# Patient Record
Sex: FEMALE | Race: BLACK OR AFRICAN AMERICAN | NOT HISPANIC OR LATINO | Employment: FULL TIME | URBAN - METROPOLITAN AREA
[De-identification: names, ages, dates, MRNs, and addresses within clinical notes are randomized per-mention and may not be internally consistent; named-entity substitution may affect disease eponyms.]

---

## 2019-10-21 ENCOUNTER — HOSPITAL ENCOUNTER (OUTPATIENT)
Dept: URGENT CARE | Facility: CLINIC | Age: 54
Discharge: HOME | End: 2019-10-21
Attending: NURSE PRACTITIONER

## 2019-11-13 ENCOUNTER — HOSPITAL ENCOUNTER (OUTPATIENT)
Dept: CT IMAGING | Facility: HOSPITAL | Age: 54
Discharge: HOME | End: 2019-11-13
Attending: NURSE PRACTITIONER

## 2020-07-22 ENCOUNTER — HOSPITAL ENCOUNTER (OUTPATIENT)
Dept: CT IMAGING | Facility: HOSPITAL | Age: 55
Discharge: HOME | End: 2020-07-22
Attending: NURSE PRACTITIONER

## 2020-07-22 LAB
CREAT BLD-MCNC: 0.8 MG/DL (ref 0.6–1.4)
GFR SERPLBLD BASED ON 1.73 SQ M-ARVRAT: >60 ML/MIN/{1.73_M2}

## 2021-05-25 ENCOUNTER — TRANSCRIBE ORDERS (OUTPATIENT)
Dept: ADMINISTRATIVE | Facility: HOSPITAL | Age: 56
End: 2021-05-25

## 2021-05-25 DIAGNOSIS — N63.20 LEFT BREAST MASS: Primary | ICD-10-CM

## 2021-06-01 ENCOUNTER — HOSPITAL ENCOUNTER (OUTPATIENT)
Dept: GENERAL RADIOLOGY | Facility: HOSPITAL | Age: 56
Discharge: HOME | End: 2021-06-01
Attending: NURSE PRACTITIONER

## 2021-06-03 ENCOUNTER — OFFICE VISIT CONVERTED (OUTPATIENT)
Dept: SURGERY | Facility: CLINIC | Age: 56
End: 2021-06-03
Attending: SURGERY

## 2021-06-04 RX ORDER — HYDROCHLOROTHIAZIDE 12.5 MG/1
12.5 TABLET ORAL DAILY
COMMUNITY
End: 2021-11-24

## 2021-06-04 RX ORDER — NORETHINDRONE ACETATE AND ETHINYL ESTRADIOL 1; 5 MG/1; UG/1
0.5 TABLET ORAL 2 TIMES DAILY
COMMUNITY
End: 2021-11-24

## 2021-06-05 NOTE — H&P
"   History and Physical      Patient Name: Abiola Garibay   Patient ID: [id number]   Sex: Female   YOB: 1965    Primary Care Provider: Jessi CHARLES   Referring Provider: Jessi CHARLES    Visit Date: Dary 3, 2021    Provider: Aaron Gordon MD   [facility]   Location Address: [address] [place] [address]  [id number]   Location Phone: [phone]          Chief Complaint  · Outpatient History & Physical / Surgical Orders  · Right groin mass      History Of Present Illness  Abiola Garibay is a 56 year old /[place] Native female who presents to the office today as a consult from Jessi CHARLES. She presents today for an enlarged inguinal lymph node. The patient reports she first noticed the enlargement three weeks ago, or maybe longer. She is not sure. She noticed the mass in her groin seemed to be getting bigger. She was sent to her PCP, who got a CT scan, which showed some lymphadenopathy in the region. After some treatments, the CT scan was repeated and what appears to be an enlarged lymph node seemed the same size or possibly worse. She reports that this is not painful. She is not had any overlying skin changes or drainage.       Past Medical History  High blood pressure         Past Surgical History  Breast; Hysterectomy-Abdominal         Medication List  hydrochlorothiazide 12.5 mg oral tablet         Allergy List  Bee Stings; lisinopril         Family Medical History  Diabetes, unspecified type; Family history of breast cancer         Social History  Alcohol; Tobacco (Never)         Review of Systems  · Gastrointestinal  o Denies  o : nausea, vomiting, diarrhea, constipation      Vitals  Date Time BP Position Site L\R Cuff Size HR RR TEMP (F) WT  HT  BMI kg/m2 BSA m2 O2 Sat FR L/min FiO2        06/03/2021 10:59 AM       14  165lbs 0oz 5'  6\" 26.63 1.87             Physical Examination  · Constitutional  o Appearance  o : " well developed, well-nourished, alert and in no acute distress  · Head and Face  o Head  o :   § Inspection  § : no deformities or lesions  · Eyes  o Conjunctivae  o : clear  o Sclerae  o : clear  · Neck  o Inspection/Palpation  o : normal appearance, no masses or tenderness, trachea midline  · Respiratory  o Respiratory Effort  o : breathing unlabored  o Inspection of Chest  o : normal appearance, no retractions  · Cardiovascular  o Heart  o : regular rate and rhythm  · Gastrointestinal  o Abdominal Examination  o : abdomen is soft  · Lymphatic  o Neck  o : no lymphadenopathy present  o Axilla  o : no lymphadenopathy present  o Groin  o : right inguinal region has a very enlarged lymph node. It appears to be about 5.0-6.0 cm. It feels like a mass but based on the CT findings, I assume it's a lymph node.   · Skin and Subcutaneous Tissue  o General Inspection  o : no rashes present, no lesions present, no areas of discoloration  · Neurologic  o Cranial Nerves  o : grossly intact  o Sensation  o : grossly intact  o Gait and Station  o :   § Gait Screening  § : normal gait, able to stand without diffculty  o Cerebellar Function  o : no obvious abnormalities  · Psychiatric  o Judgement and Insight  o : judgment and insight intact  o Mood and Affect  o : mood normal, affect appropriate          Assessment  · Pre-Surgical Orders     V72.84  · Groin mass in female     789.39/R19.09  Right side     Patient with an enlarged right inguinal lymphadenopathy.       Plan  · Orders  o General Surgery Order (GENOR) - 789.39/R19.09 - 06/09/2021  · Medications  o Medications have been Reconciled  o Transition of Care or Provider Policy  · Instructions  o PLAN:   o Handouts Provided-Pre-Procedure Instructions including date and time and location of procedure.  o Surgical Facility: [facility]  o ****Patient Status****  o Outpatient  o ********************  o RISK AND BENEFITS:  o Consent for surgery: Given these options, the  patient has verbally expressed an understanding of the risks of surgery and finds these risks acceptable. We will proceed with surgery as soon as possible.  o Consult Anesthesia for any post-operative block, or any pain management procedure deemed necessary by the anestesiologist for adequate post-operative pain control.   o O.R. PREP: Per protocol  o IV: Per Anesthesia  o IV: LR@ 75ml/hr  o PLEASE SIGN PERMIT FOR: Excision of right inguinal lymph node  o *__Kefzol 2 gram IV on call to OR.  o *___The above History and Physical Examination has been completed within 30 days of admission.  o Pre-Admission Testing Date: [facility] PHONE: 6/04/21 at 8AM  o Electronically Identified Patient Education Materials Provided Electronically  · Referrals  o ID: [id number] Date: 05/24/2021 Type: Inbound  Specialty: General Surgery     We will plan for excision of the right inguinal lymph node. Risks, benefits, and alternatives were discussed with the patient extensively. All questions were answered. The patient voiced understanding and agrees to proceed with the above plan.             Electronically Signed by: Palma Pierce-, -Author on June 4, 2021 09:23:51 AM  Electronically Co-signed by: Aaron Gordon MD -Reviewer on June 4, 2021 10:03:30 AM

## 2021-06-08 ENCOUNTER — HOSPITAL ENCOUNTER (OUTPATIENT)
Dept: OTHER | Facility: HOSPITAL | Age: 56
Discharge: HOME | End: 2021-06-08

## 2021-06-08 DIAGNOSIS — Z09 FOLLOW UP: ICD-10-CM

## 2021-06-09 ENCOUNTER — HOSPITAL ENCOUNTER (OUTPATIENT)
Facility: HOSPITAL | Age: 56
Setting detail: HOSPITAL OUTPATIENT SURGERY
Discharge: HOME | End: 2021-06-09
Attending: SURGERY | Admitting: SURGERY

## 2021-06-09 ENCOUNTER — ANESTHESIA (OUTPATIENT)
Dept: PERIOP | Facility: HOSPITAL | Age: 56
End: 2021-06-09

## 2021-06-09 ENCOUNTER — ANESTHESIA EVENT (OUTPATIENT)
Dept: PERIOP | Facility: HOSPITAL | Age: 56
End: 2021-06-09

## 2021-06-09 VITALS
RESPIRATION RATE: 18 BRPM | BODY MASS INDEX: 26.15 KG/M2 | WEIGHT: 162.7 LBS | TEMPERATURE: 97.5 F | OXYGEN SATURATION: 100 % | DIASTOLIC BLOOD PRESSURE: 58 MMHG | SYSTOLIC BLOOD PRESSURE: 114 MMHG | HEIGHT: 66 IN | HEART RATE: 54 BPM

## 2021-06-09 DIAGNOSIS — R59.9 ENLARGED LYMPH NODE: Primary | ICD-10-CM

## 2021-06-09 DIAGNOSIS — N63.20 LEFT BREAST MASS: ICD-10-CM

## 2021-06-09 PROBLEM — Z98.890 H/O LYMPH NODE EXCISION: Status: ACTIVE | Noted: 2021-06-09

## 2021-06-09 LAB
ANION GAP SERPL CALCULATED.3IONS-SCNC: 7.4 MMOL/L (ref 5–15)
BUN SERPL-MCNC: 9 MG/DL (ref 6–20)
BUN/CREAT SERPL: 8.5 (ref 7–25)
CALCIUM SPEC-SCNC: 9.2 MG/DL (ref 8.6–10.5)
CHLORIDE SERPL-SCNC: 103 MMOL/L (ref 98–107)
CO2 SERPL-SCNC: 26.6 MMOL/L (ref 22–29)
CREAT SERPL-MCNC: 1.06 MG/DL (ref 0.57–1)
GFR SERPL CREATININE-BSD FRML MDRD: 65 ML/MIN/1.73
GLUCOSE SERPL-MCNC: 88 MG/DL (ref 65–99)
POTASSIUM SERPL-SCNC: 3.7 MMOL/L (ref 3.5–5.2)
QT INTERVAL: 446 MS
SODIUM SERPL-SCNC: 137 MMOL/L (ref 136–145)

## 2021-06-09 PROCEDURE — 25010000002 MIDAZOLAM PER 1MG: Performed by: ANESTHESIOLOGY

## 2021-06-09 PROCEDURE — 25010000003 CEFAZOLIN IN DEXTROSE 2-4 GM/100ML-% SOLUTION: Performed by: SURGERY

## 2021-06-09 PROCEDURE — 25010000002 PROPOFOL 10 MG/ML EMULSION: Performed by: NURSE ANESTHETIST, CERTIFIED REGISTERED

## 2021-06-09 PROCEDURE — 38531 OPEN BX/EXC INGUINOFEM NODES: CPT | Performed by: SPECIALIST/TECHNOLOGIST, OTHER

## 2021-06-09 PROCEDURE — 25010000002 FENTANYL CITRATE (PF) 50 MCG/ML SOLUTION: Performed by: NURSE ANESTHETIST, CERTIFIED REGISTERED

## 2021-06-09 PROCEDURE — 88305 TISSUE EXAM BY PATHOLOGIST: CPT | Performed by: SURGERY

## 2021-06-09 PROCEDURE — 88184 FLOWCYTOMETRY/ TC 1 MARKER: CPT

## 2021-06-09 PROCEDURE — 25010000002 DEXAMETHASONE PER 1 MG: Performed by: NURSE ANESTHETIST, CERTIFIED REGISTERED

## 2021-06-09 PROCEDURE — 38531 OPEN BX/EXC INGUINOFEM NODES: CPT | Performed by: SURGERY

## 2021-06-09 PROCEDURE — 80048 BASIC METABOLIC PNL TOTAL CA: CPT | Performed by: SURGERY

## 2021-06-09 PROCEDURE — 88342 IMHCHEM/IMCYTCHM 1ST ANTB: CPT | Performed by: SURGERY

## 2021-06-09 PROCEDURE — 25010000002 ONDANSETRON PER 1 MG: Performed by: NURSE ANESTHETIST, CERTIFIED REGISTERED

## 2021-06-09 PROCEDURE — 88341 IMHCHEM/IMCYTCHM EA ADD ANTB: CPT | Performed by: SURGERY

## 2021-06-09 PROCEDURE — 93005 ELECTROCARDIOGRAM TRACING: CPT | Performed by: SURGERY

## 2021-06-09 PROCEDURE — 25010000003 LIDOCAINE 1 % SOLUTION 20 ML VIAL: Performed by: SURGERY

## 2021-06-09 PROCEDURE — 88185 FLOWCYTOMETRY/TC ADD-ON: CPT

## 2021-06-09 DEVICE — SEAL HEMO SURG ARISTA/AH ABS/PWDR 1GM: Type: IMPLANTABLE DEVICE | Site: GROIN | Status: FUNCTIONAL

## 2021-06-09 RX ORDER — SODIUM CHLORIDE 0.9 % (FLUSH) 0.9 %
10 SYRINGE (ML) INJECTION EVERY 12 HOURS SCHEDULED
Status: DISCONTINUED | OUTPATIENT
Start: 2021-06-09 | End: 2021-06-09 | Stop reason: HOSPADM

## 2021-06-09 RX ORDER — HYDROCODONE BITARTRATE AND ACETAMINOPHEN 5; 325 MG/1; MG/1
1-2 TABLET ORAL EVERY 4 HOURS PRN
Start: 2021-06-09 | End: 2021-08-18 | Stop reason: SDUPTHER

## 2021-06-09 RX ORDER — ONDANSETRON 2 MG/ML
4 INJECTION INTRAMUSCULAR; INTRAVENOUS ONCE AS NEEDED
Status: DISCONTINUED | OUTPATIENT
Start: 2021-06-09 | End: 2021-06-10 | Stop reason: HOSPADM

## 2021-06-09 RX ORDER — DEXAMETHASONE SODIUM PHOSPHATE 4 MG/ML
INJECTION, SOLUTION INTRA-ARTICULAR; INTRALESIONAL; INTRAMUSCULAR; INTRAVENOUS; SOFT TISSUE AS NEEDED
Status: DISCONTINUED | OUTPATIENT
Start: 2021-06-09 | End: 2021-06-09 | Stop reason: SURG

## 2021-06-09 RX ORDER — OXYCODONE HYDROCHLORIDE 5 MG/1
5 TABLET ORAL ONCE AS NEEDED
Status: DISCONTINUED | OUTPATIENT
Start: 2021-06-09 | End: 2021-06-10 | Stop reason: HOSPADM

## 2021-06-09 RX ORDER — OXYCODONE HYDROCHLORIDE AND ACETAMINOPHEN 5; 325 MG/1; MG/1
1 TABLET ORAL ONCE AS NEEDED
Status: DISCONTINUED | OUTPATIENT
Start: 2021-06-09 | End: 2021-06-10 | Stop reason: HOSPADM

## 2021-06-09 RX ORDER — CEFAZOLIN SODIUM 2 G/100ML
2 INJECTION, SOLUTION INTRAVENOUS
Status: COMPLETED | OUTPATIENT
Start: 2021-06-09 | End: 2021-06-09

## 2021-06-09 RX ORDER — GLYCOPYRROLATE 0.2 MG/ML
0.2 INJECTION INTRAMUSCULAR; INTRAVENOUS
Status: COMPLETED | OUTPATIENT
Start: 2021-06-09 | End: 2021-06-09

## 2021-06-09 RX ORDER — MIDAZOLAM HYDROCHLORIDE 1 MG/ML
2 INJECTION INTRAMUSCULAR; INTRAVENOUS ONCE
Status: COMPLETED | OUTPATIENT
Start: 2021-06-09 | End: 2021-06-09

## 2021-06-09 RX ORDER — PROPOFOL 10 MG/ML
VIAL (ML) INTRAVENOUS AS NEEDED
Status: DISCONTINUED | OUTPATIENT
Start: 2021-06-09 | End: 2021-06-09 | Stop reason: SURG

## 2021-06-09 RX ORDER — SODIUM CHLORIDE, SODIUM LACTATE, POTASSIUM CHLORIDE, CALCIUM CHLORIDE 600; 310; 30; 20 MG/100ML; MG/100ML; MG/100ML; MG/100ML
9 INJECTION, SOLUTION INTRAVENOUS CONTINUOUS PRN
Status: DISCONTINUED | OUTPATIENT
Start: 2021-06-09 | End: 2021-06-10 | Stop reason: HOSPADM

## 2021-06-09 RX ORDER — DOCUSATE SODIUM 250 MG
250 CAPSULE ORAL 2 TIMES DAILY PRN
Qty: 10 CAPSULE | Refills: 0
Start: 2021-06-09 | End: 2021-11-24

## 2021-06-09 RX ORDER — MEPERIDINE HYDROCHLORIDE 25 MG/ML
12.5 INJECTION INTRAMUSCULAR; INTRAVENOUS; SUBCUTANEOUS
Status: DISCONTINUED | OUTPATIENT
Start: 2021-06-09 | End: 2021-06-10 | Stop reason: HOSPADM

## 2021-06-09 RX ORDER — LIDOCAINE HYDROCHLORIDE 20 MG/ML
INJECTION, SOLUTION INFILTRATION; PERINEURAL AS NEEDED
Status: DISCONTINUED | OUTPATIENT
Start: 2021-06-09 | End: 2021-06-09 | Stop reason: SURG

## 2021-06-09 RX ORDER — SODIUM CHLORIDE 0.9 % (FLUSH) 0.9 %
10 SYRINGE (ML) INJECTION AS NEEDED
Status: DISCONTINUED | OUTPATIENT
Start: 2021-06-09 | End: 2021-06-09 | Stop reason: HOSPADM

## 2021-06-09 RX ORDER — ONDANSETRON 2 MG/ML
INJECTION INTRAMUSCULAR; INTRAVENOUS AS NEEDED
Status: DISCONTINUED | OUTPATIENT
Start: 2021-06-09 | End: 2021-06-09 | Stop reason: SURG

## 2021-06-09 RX ORDER — ACETAMINOPHEN 500 MG
1000 TABLET ORAL ONCE
Status: COMPLETED | OUTPATIENT
Start: 2021-06-09 | End: 2021-06-09

## 2021-06-09 RX ORDER — FENTANYL CITRATE 50 UG/ML
INJECTION, SOLUTION INTRAMUSCULAR; INTRAVENOUS AS NEEDED
Status: DISCONTINUED | OUTPATIENT
Start: 2021-06-09 | End: 2021-06-09 | Stop reason: SURG

## 2021-06-09 RX ADMIN — LIDOCAINE HYDROCHLORIDE 100 MG: 20 INJECTION, SOLUTION INFILTRATION; PERINEURAL at 18:16

## 2021-06-09 RX ADMIN — ACETAMINOPHEN 1000 MG: 500 TABLET ORAL at 14:21

## 2021-06-09 RX ADMIN — DEXAMETHASONE SODIUM PHOSPHATE 4 MG: 4 INJECTION INTRA-ARTICULAR; INTRALESIONAL; INTRAMUSCULAR; INTRAVENOUS; SOFT TISSUE at 18:32

## 2021-06-09 RX ADMIN — PROPOFOL 200 MG: 10 INJECTION, EMULSION INTRAVENOUS at 18:16

## 2021-06-09 RX ADMIN — FENTANYL CITRATE 50 MCG: 50 INJECTION INTRAMUSCULAR; INTRAVENOUS at 18:56

## 2021-06-09 RX ADMIN — ONDANSETRON 4 MG: 2 INJECTION INTRAMUSCULAR; INTRAVENOUS at 18:32

## 2021-06-09 RX ADMIN — CEFAZOLIN SODIUM 2 G: 2 INJECTION, SOLUTION INTRAVENOUS at 18:15

## 2021-06-09 RX ADMIN — GLYCOPYRROLATE 0.2 MG: 0.2 INJECTION INTRAMUSCULAR; INTRAVENOUS at 17:50

## 2021-06-09 RX ADMIN — FENTANYL CITRATE 50 MCG: 50 INJECTION INTRAMUSCULAR; INTRAVENOUS at 18:17

## 2021-06-09 RX ADMIN — MIDAZOLAM HYDROCHLORIDE 2 MG: 1 INJECTION, SOLUTION INTRAMUSCULAR; INTRAVENOUS at 17:51

## 2021-06-09 RX ADMIN — SODIUM CHLORIDE, POTASSIUM CHLORIDE, SODIUM LACTATE AND CALCIUM CHLORIDE 9 ML/HR: 600; 310; 30; 20 INJECTION, SOLUTION INTRAVENOUS at 14:21

## 2021-06-09 NOTE — ANESTHESIA PREPROCEDURE EVALUATION
Anesthesia Evaluation                  Airway   Mallampati: I  TM distance: >3 FB  Neck ROM: full  No difficulty expected  Dental      Pulmonary - normal exam    breath sounds clear to auscultation  Cardiovascular - normal exam    ECG reviewed  Rhythm: regular    (+) hypertension,       Neuro/Psych  GI/Hepatic/Renal/Endo      Musculoskeletal     Abdominal    Substance History      OB/GYN          Other                      Anesthesia Plan    ASA 2     general and MAC   (Patient understands anesthesia not responsible for dental damage.)  intravenous induction     Anesthetic plan, all risks, benefits, and alternatives have been provided, discussed and informed consent has been obtained with: patient.  Use of blood products discussed with patient .   Plan discussed with CRNA.

## 2021-06-10 NOTE — ANESTHESIA POSTPROCEDURE EVALUATION
Patient: Abiola Garibay    Procedure Summary     Date: 06/09/21 Room / Location: [facility] OR 02 / [facility] MAIN OR    Anesthesia Start: 1812 Anesthesia Stop: 1927    Procedure: EXCISION OF RIGHT INGUINAL LYMPHNOID (Right ) Diagnosis: (INGUINAL LYMPHNOID)    Surgeons: Aaron Gordon MD Provider: Saw Uriostegui MD    Anesthesia Type: general, MAC ASA Status: 2          Anesthesia Type: general, MAC    Vitals  Vitals Value Taken Time   /69 06/09/21 2008   Temp 36.5 °C (97.7 °F) 06/09/21 2005   Pulse 46 06/09/21 2012   Resp 20 06/09/21 2005   SpO2 100 % 06/09/21 2012   Vitals shown include unvalidated device data.        Post Anesthesia Care and Evaluation    Patient location during evaluation: PACU  Patient participation: complete - patient participated  Level of consciousness: awake and alert  Pain score: 0  Pain management: adequate  Airway patency: patent  Anesthetic complications: No anesthetic complications  PONV Status: none  Cardiovascular status: acceptable  Respiratory status: acceptable  Hydration status: acceptable  No anesthesia care post op

## 2021-06-11 ENCOUNTER — TELEPHONE (OUTPATIENT)
Dept: SURGERY | Facility: CLINIC | Age: 56
End: 2021-06-11

## 2021-06-11 DIAGNOSIS — R59.1 LYMPHADENOPATHY: Primary | ICD-10-CM

## 2021-06-11 NOTE — TELEPHONE ENCOUNTER
Jeny called from pathology. Has a lymph node biopsy from 06/09 that is being sent out to flow cytometry.  Dr. Gordon has to put an order in Epic before they can send it out. Jeny's number is [phone].

## 2021-06-21 LAB — REF LAB TEST METHOD: NORMAL

## 2021-06-23 ENCOUNTER — OFFICE VISIT (OUTPATIENT)
Dept: SURGERY | Facility: CLINIC | Age: 56
End: 2021-06-23

## 2021-06-23 ENCOUNTER — TELEPHONE (OUTPATIENT)
Dept: SURGERY | Facility: CLINIC | Age: 56
End: 2021-06-23

## 2021-06-23 VITALS — RESPIRATION RATE: 16 BRPM | HEIGHT: 66 IN | WEIGHT: 166 LBS | BODY MASS INDEX: 26.68 KG/M2

## 2021-06-23 DIAGNOSIS — C85.96 LYMPHOMA OF LYMPH NODES IN PELVIS, UNSPECIFIED LYMPHOMA TYPE (HCC): Primary | ICD-10-CM

## 2021-06-23 LAB
CYTO UR: NORMAL
LAB AP CASE REPORT: NORMAL
LAB AP CLINICAL INFORMATION: NORMAL
LAB AP FLOW CYTOMETRY SUMMARY: NORMAL
LAB AP SPECIAL STAINS: NORMAL
PATH REPORT.FINAL DX SPEC: NORMAL
PATH REPORT.GROSS SPEC: NORMAL

## 2021-06-23 PROCEDURE — 99024 POSTOP FOLLOW-UP VISIT: CPT | Performed by: SURGERY

## 2021-06-23 NOTE — PROGRESS NOTES
Chief Complaint: Follow-up    Subjective         History of Present Illness  Abiola Garibay is a 56 y.o. female presents to [facility] to be seen for excision of a right inguinal lymph node.  Patient is done well after her procedure.  She does have some swelling in the groin.  Otherwise doing well.  No new complaints.    Objective     Past Medical History:   Diagnosis Date   • Enlarged lymph node     right inguinal lymphnoid   • High blood pressure        Past Surgical History:   Procedure Laterality Date   • ABDOMINAL HYSTERECTOMY     • BREAST SURGERY      breast reduction (bilateral)    • EXCISION LESION Right 6/9/2021    Procedure: EXCISION OF RIGHT INGUINAL LYMPHNOID;  Surgeon: Aaron Gordon MD;  Location: [facility] MAIN OR;  Service: General;  Laterality: Right;         Current Outpatient Medications:   •  hydroCHLOROthiazide (HYDRODIURIL) 12.5 MG tablet, Take 12.5 mg by mouth Daily., Disp: , Rfl:   •  norethindrone-ethinyl estradiol (FEMHRT 1/5) 1-5 MG-MCG tablet, Take 0.5 tablets by mouth 2 (Two) Times a Day. Combination med of progestrone, estrogen, testosterone, sublingual tab - takes 1/2 in am, 1/2 in pm, Disp: , Rfl:   •  docusate sodium (COLACE) 250 MG capsule, Take 1 capsule by mouth 2 (Two) Times a Day As Needed for Constipation., Disp: 10 capsule, Rfl: 0  •  HYDROcodone-acetaminophen (NORCO) 5-325 MG per tablet, Take 1-2 tablets by mouth Every 4 (Four) Hours As Needed (Pain)., Disp: , Rfl:     Allergies   Allergen Reactions   • Bee Venom Hives   • Lisinopril Unknown - Low Severity        Family History   Problem Relation Age of Onset   • Breast cancer Sister         50S   • Diabetes Paternal Grandmother        Social History     Socioeconomic History   • Marital status:      Spouse name: Not on file   • Number of children: Not on file   • Years of education: Not on file   • Highest education level: Not on file   Tobacco Use   • Smoking status: Never Smoker   •  Smokeless tobacco: Never Used   Substance and Sexual Activity   • Alcohol use: Yes     Comment: rarely    • Drug use: Never        Physical Exam   Post Surgical Incision  Surgical wound: Healing appropriately, she does have a very large seroma with no signs of infection.  There is no erythema it is not warm to touch there is no purulent drainage.  She does have a small unepithelialized area in the midportion of the wound    Result Review :               Assessment and Plan    Diagnoses and all orders for this visit:    1. 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  Patient was given instructions and counseling regarding her condition or for health maintenance advice. Please see specific information pulled into the AVS if appropriate.     Patient seems to be healing appropriately.  At the time of the office visit I did not have her final pathology although cytology did seem to indicate a T-cell lymphoma.  After the patient left the office I got a call confirming her final pathology of a T-cell lymphoma.  I called the patient but did not get an answer and will call her back.  We will need to refer her to medical oncology for the ultimate treatment of her lymphoma.  In regards to her seroma, this will likely resolve on its own.  I have informed her to watch out for signs of infection.  If it becomes unmanageable or painful we can discuss aspiration.    Discussed with the patient - all questions were answered they voiced understanding and agreed to proceed with above plan

## 2021-06-25 ENCOUNTER — TELEPHONE (OUTPATIENT)
Dept: SURGERY | Facility: CLINIC | Age: 56
End: 2021-06-25

## 2021-07-06 ENCOUNTER — TELEPHONE (OUTPATIENT)
Dept: SURGERY | Facility: CLINIC | Age: 56
End: 2021-07-06

## 2021-07-06 NOTE — TELEPHONE ENCOUNTER
Patient said she got a prior authorization for care from her insurance for lymphedema therapy.  She said Dr. Gordon was supposed to be referring to oncology.  She asked to be called, as she thinks this might be a mistake.

## 2021-07-07 NOTE — TELEPHONE ENCOUNTER
Called and spoke with pt an answered her questions. Pt to cb with any other questions or concerns.

## 2021-07-15 VITALS — RESPIRATION RATE: 14 BRPM | HEIGHT: 66 IN | WEIGHT: 165 LBS | BODY MASS INDEX: 26.52 KG/M2

## 2021-07-16 PROBLEM — I10 HIGH BLOOD PRESSURE: Status: ACTIVE | Noted: 2021-07-16

## 2021-07-19 ENCOUNTER — TELEPHONE (OUTPATIENT)
Dept: SURGERY | Facility: CLINIC | Age: 56
End: 2021-07-19

## 2021-07-19 ENCOUNTER — TRANSCRIBE ORDERS (OUTPATIENT)
Dept: ADMINISTRATIVE | Facility: HOSPITAL | Age: 56
End: 2021-07-19

## 2021-07-19 ENCOUNTER — OFFICE VISIT (OUTPATIENT)
Dept: SURGERY | Facility: CLINIC | Age: 56
End: 2021-07-19

## 2021-07-19 VITALS — HEIGHT: 66 IN | BODY MASS INDEX: 26.36 KG/M2 | WEIGHT: 164 LBS | RESPIRATION RATE: 16 BRPM

## 2021-07-19 DIAGNOSIS — R10.30 LOWER ABDOMINAL PAIN: ICD-10-CM

## 2021-07-19 DIAGNOSIS — Z98.890 STATUS POST LYMPH NODE BIOPSY: Primary | ICD-10-CM

## 2021-07-19 DIAGNOSIS — Z98.890 S/P LYMPH NODE BIOPSY: Primary | ICD-10-CM

## 2021-07-19 PROCEDURE — 99213 OFFICE O/P EST LOW 20 MIN: CPT | Performed by: NURSE PRACTITIONER

## 2021-07-19 NOTE — TELEPHONE ENCOUNTER
Patient called and said there is swelling around the incision site and there is a hard spot there as well and would like to get in asap please. Also stated that her leg, ankle, calf, foot and abd is all swollen to. Started last week. Can you see her asap? SX with Heidi 6-9-21 (Excision of right inguinal lymphoid)

## 2021-07-19 NOTE — PROGRESS NOTES
"Chief Complaint  Post-op Follow-up (swelling)    Subjective          Abiola Garibay presents to [facility]  Patient is a 56-year-old Black/-American female that presents to general surgery office with  some concerns.    Patient underwent a excision of a right groin mass on 6/9/2021 performed by Dr. Aaron Gordon.    Patient reports today that she is with some right lower pelvic pain and right thigh swelling.    Patient reports that the swelling in her thigh is getting bigger.    Patient is with lots of concerns today, requesting CT of abdomen and pelvis.       Objective   Vital Signs:   Resp 16   Ht 167.6 cm (66\")   Wt 74.4 kg (164 lb)   BMI 26.47 kg/m²     Physical Exam  Abdominal:      Tenderness: There is abdominal tenderness.   Musculoskeletal:         General: Swelling present.      Right lower leg: Edema present.        Result Review :                 Assessment and Plan {CC Problem List  Visit Diagnosis   ROS  Review (Popup)  Health Maintenance  Quality  BestPractice  Medications  SmartSets  SnapShot Encounters  Media :23}   Diagnoses and all orders for this visit:    1. S/P lymph node biopsy (Primary)  -     CT Abdomen Pelvis With & Without Contrast    2. Lower abdominal pain  -     CT Abdomen Pelvis With & Without Contrast        Follow Up   Return for Follow-up with Dr. Gordon post imaging..       Patient was given instructions and counseling regarding her condition or for health maintenance advice. Please see specific information pulled into the AVS if appropriate.       "

## 2021-07-19 NOTE — TELEPHONE ENCOUNTER
Pt is aware her CT is r/s to [facility] on 7/20/21 arrive at 2:30 pm and follow oral prep for PM not AM.

## 2021-07-20 ENCOUNTER — HOSPITAL ENCOUNTER (OUTPATIENT)
Dept: CT IMAGING | Facility: HOSPITAL | Age: 56
Discharge: HOME | End: 2021-07-20

## 2021-07-20 ENCOUNTER — APPOINTMENT (OUTPATIENT)
Dept: CT IMAGING | Facility: HOSPITAL | Age: 56
End: 2021-07-20

## 2021-07-20 ENCOUNTER — CONSULT (OUTPATIENT)
Dept: ONCOLOGY | Facility: HOSPITAL | Age: 56
End: 2021-07-20

## 2021-07-20 ENCOUNTER — TRANSCRIBE ORDERS (OUTPATIENT)
Dept: ADMINISTRATIVE | Facility: HOSPITAL | Age: 56
End: 2021-07-20

## 2021-07-20 ENCOUNTER — HOSPITAL ENCOUNTER (OUTPATIENT)
Dept: CARDIOLOGY | Facility: HOSPITAL | Age: 56
Discharge: HOME | End: 2021-07-20

## 2021-07-20 ENCOUNTER — LAB (OUTPATIENT)
Dept: ONCOLOGY | Facility: HOSPITAL | Age: 56
End: 2021-07-20

## 2021-07-20 VITALS
RESPIRATION RATE: 16 BRPM | BODY MASS INDEX: 26.33 KG/M2 | WEIGHT: 163.8 LBS | DIASTOLIC BLOOD PRESSURE: 63 MMHG | HEIGHT: 66 IN | HEART RATE: 63 BPM | SYSTOLIC BLOOD PRESSURE: 117 MMHG | OXYGEN SATURATION: 99 %

## 2021-07-20 DIAGNOSIS — C84.45 PERIPHERAL T CELL LYMPHOMA OF INGUINAL REGION (HCC): ICD-10-CM

## 2021-07-20 DIAGNOSIS — Z79.899 HIGH RISK MEDICATION USE: ICD-10-CM

## 2021-07-20 DIAGNOSIS — I82.411 ACUTE DEEP VEIN THROMBOSIS (DVT) OF FEMORAL VEIN OF RIGHT LOWER EXTREMITY (HCC): ICD-10-CM

## 2021-07-20 DIAGNOSIS — M79.89 SWELLING OF RIGHT LOWER EXTREMITY: ICD-10-CM

## 2021-07-20 DIAGNOSIS — C84.45 PERIPHERAL T CELL LYMPHOMA OF INGUINAL REGION (HCC): Primary | ICD-10-CM

## 2021-07-20 LAB
ALBUMIN SERPL-MCNC: 4.5 G/DL (ref 3.5–5.2)
ALBUMIN/GLOB SERPL: 1.8 G/DL
ALP SERPL-CCNC: 48 U/L (ref 39–117)
ALT SERPL W P-5'-P-CCNC: 19 U/L (ref 1–33)
ANION GAP SERPL CALCULATED.3IONS-SCNC: 11.3 MMOL/L (ref 5–15)
AST SERPL-CCNC: 39 U/L (ref 1–32)
BASOPHILS # BLD AUTO: 0.02 10*3/MM3 (ref 0–0.2)
BASOPHILS NFR BLD AUTO: 0.5 % (ref 0–1.5)
BH CV LOW VAS RIGHT COMMON FEMORAL SPONT: 1
BH CV LOW VAS RIGHT EXTERNAL ILIAC SPONT: 1
BH CV LOW VAS RIGHT PROXIMAL FEMORAL SPONT: 1
BH CV LOWER VASCULAR LEFT COMMON FEMORAL AUGMENT: NORMAL
BH CV LOWER VASCULAR LEFT COMMON FEMORAL COMPETENT: NORMAL
BH CV LOWER VASCULAR LEFT COMMON FEMORAL COMPRESS: NORMAL
BH CV LOWER VASCULAR LEFT COMMON FEMORAL PHASIC: NORMAL
BH CV LOWER VASCULAR LEFT COMMON FEMORAL SPONT: NORMAL
BH CV LOWER VASCULAR RIGHT COMMON FEMORAL COMPRESS: NORMAL
BH CV LOWER VASCULAR RIGHT COMMON FEMORAL PHASIC: NORMAL
BH CV LOWER VASCULAR RIGHT COMMON FEMORAL SPONT: NORMAL
BH CV LOWER VASCULAR RIGHT EXTERNAL ILIAC SPONT: NORMAL
BH CV LOWER VASCULAR RIGHT GREATER SAPH AK COMPRESS: NORMAL
BH CV LOWER VASCULAR RIGHT GREATER SAPH AK PHASIC: NORMAL
BH CV LOWER VASCULAR RIGHT GREATER SAPH AK SPONT: NORMAL
BH CV LOWER VASCULAR RIGHT MID FEMORAL AUGMENT: NORMAL
BH CV LOWER VASCULAR RIGHT MID FEMORAL COMPETENT: NORMAL
BH CV LOWER VASCULAR RIGHT MID FEMORAL COMPRESS: NORMAL
BH CV LOWER VASCULAR RIGHT MID FEMORAL PHASIC: NORMAL
BH CV LOWER VASCULAR RIGHT MID FEMORAL SPONT: NORMAL
BH CV LOWER VASCULAR RIGHT PERONEAL COMPRESS: NORMAL
BH CV LOWER VASCULAR RIGHT PERONEAL PHASIC: NORMAL
BH CV LOWER VASCULAR RIGHT PERONEAL SPONT: NORMAL
BH CV LOWER VASCULAR RIGHT POPLITEAL AUGMENT: NORMAL
BH CV LOWER VASCULAR RIGHT POPLITEAL COMPETENT: NORMAL
BH CV LOWER VASCULAR RIGHT POPLITEAL COMPRESS: NORMAL
BH CV LOWER VASCULAR RIGHT POPLITEAL PHASIC: NORMAL
BH CV LOWER VASCULAR RIGHT POPLITEAL SPONT: NORMAL
BH CV LOWER VASCULAR RIGHT POSTERIOR TIBIAL COMPRESS: NORMAL
BH CV LOWER VASCULAR RIGHT POSTERIOR TIBIAL PHASIC: NORMAL
BH CV LOWER VASCULAR RIGHT POSTERIOR TIBIAL SPONT: NORMAL
BH CV LOWER VASCULAR RIGHT PROXIMAL FEMORAL AUGMENT: NORMAL
BH CV LOWER VASCULAR RIGHT PROXIMAL FEMORAL COMPRESS: NORMAL
BH CV LOWER VASCULAR RIGHT PROXIMAL FEMORAL PHASIC: NORMAL
BH CV LOWER VASCULAR RIGHT PROXIMAL FEMORAL SPONT: NORMAL
BILIRUB SERPL-MCNC: 0.6 MG/DL (ref 0–1.2)
BUN SERPL-MCNC: 12 MG/DL (ref 6–20)
BUN/CREAT SERPL: 12 (ref 7–25)
CALCIUM SPEC-SCNC: 9.9 MG/DL (ref 8.6–10.5)
CHLORIDE SERPL-SCNC: 101 MMOL/L (ref 98–107)
CO2 SERPL-SCNC: 27.7 MMOL/L (ref 22–29)
CREAT SERPL-MCNC: 1 MG/DL (ref 0.57–1)
DEPRECATED RDW RBC AUTO: 41.2 FL (ref 37–54)
EOSINOPHIL # BLD AUTO: 0.08 10*3/MM3 (ref 0–0.4)
EOSINOPHIL NFR BLD AUTO: 2 % (ref 0.3–6.2)
ERYTHROCYTE [DISTWIDTH] IN BLOOD BY AUTOMATED COUNT: 12.6 % (ref 12.3–15.4)
GFR SERPL CREATININE-BSD FRML MDRD: 69 ML/MIN/1.73
GLOBULIN UR ELPH-MCNC: 2.5 GM/DL
GLUCOSE SERPL-MCNC: 87 MG/DL (ref 65–99)
HCT VFR BLD AUTO: 38.6 % (ref 34–46.6)
HGB BLD-MCNC: 13.6 G/DL (ref 12–15.9)
IMM GRANULOCYTES # BLD AUTO: 0.01 10*3/MM3 (ref 0–0.05)
IMM GRANULOCYTES NFR BLD AUTO: 0.2 % (ref 0–0.5)
LDH SERPL-CCNC: 585 U/L (ref 135–214)
LYMPHOCYTES # BLD AUTO: 1.38 10*3/MM3 (ref 0.7–3.1)
LYMPHOCYTES NFR BLD AUTO: 33.7 % (ref 19.6–45.3)
MAXIMAL PREDICTED HEART RATE: 163 BPM
MCH RBC QN AUTO: 31.5 PG (ref 26.6–33)
MCHC RBC AUTO-ENTMCNC: 35.2 G/DL (ref 31.5–35.7)
MCV RBC AUTO: 89.4 FL (ref 79–97)
MONOCYTES # BLD AUTO: 0.72 10*3/MM3 (ref 0.1–0.9)
MONOCYTES NFR BLD AUTO: 17.6 % (ref 5–12)
NEUTROPHILS NFR BLD AUTO: 1.88 10*3/MM3 (ref 1.7–7)
NEUTROPHILS NFR BLD AUTO: 46 % (ref 42.7–76)
PLATELET # BLD AUTO: 192 10*3/MM3 (ref 140–450)
PMV BLD AUTO: 11.2 FL (ref 6–12)
POTASSIUM SERPL-SCNC: 3.4 MMOL/L (ref 3.5–5.2)
PROT SERPL-MCNC: 7 G/DL (ref 6–8.5)
RBC # BLD AUTO: 4.32 10*6/MM3 (ref 3.77–5.28)
SODIUM SERPL-SCNC: 140 MMOL/L (ref 136–145)
STRESS TARGET HR: 139 BPM
URATE SERPL-MCNC: 7.3 MG/DL (ref 2.4–5.7)
WBC # BLD AUTO: 4.09 10*3/MM3 (ref 3.4–10.8)

## 2021-07-20 PROCEDURE — 99205 OFFICE O/P NEW HI 60 MIN: CPT | Performed by: INTERNAL MEDICINE

## 2021-07-20 PROCEDURE — G0463 HOSPITAL OUTPT CLINIC VISIT: HCPCS

## 2021-07-20 PROCEDURE — 93971 EXTREMITY STUDY: CPT

## 2021-07-20 PROCEDURE — 83615 LACTATE (LD) (LDH) ENZYME: CPT

## 2021-07-20 PROCEDURE — 80053 COMPREHEN METABOLIC PANEL: CPT

## 2021-07-20 PROCEDURE — 85025 COMPLETE CBC W/AUTO DIFF WBC: CPT

## 2021-07-20 PROCEDURE — 36415 COLL VENOUS BLD VENIPUNCTURE: CPT

## 2021-07-20 PROCEDURE — 74178 CT ABD&PLV WO CNTR FLWD CNTR: CPT

## 2021-07-20 PROCEDURE — G0463 HOSPITAL OUTPT CLINIC VISIT: HCPCS | Performed by: INTERNAL MEDICINE

## 2021-07-20 PROCEDURE — 84550 ASSAY OF BLOOD/URIC ACID: CPT

## 2021-07-20 PROCEDURE — 93971 EXTREMITY STUDY: CPT | Performed by: SURGERY

## 2021-07-20 PROCEDURE — 0 IOPAMIDOL PER 1 ML: Performed by: NURSE PRACTITIONER

## 2021-07-20 RX ORDER — SODIUM CHLORIDE 9 MG/ML
500 INJECTION, SOLUTION INTRAVENOUS ONCE
Status: CANCELLED | OUTPATIENT
Start: 2021-07-21 | End: 2021-07-20

## 2021-07-20 RX ADMIN — IOPAMIDOL 100 ML: 755 INJECTION, SOLUTION INTRAVENOUS at 16:14

## 2021-07-20 NOTE — ASSESSMENT & PLAN NOTE
Patient presents for evaluation of newly diagnosed peripheral T-cell lymphoma, NOS.  This is identified on excisional biopsy of the right groin.  She does not have any other obvious adenopathy or B symptoms.  She does have a small nodule on the right upper eyelid that she states is getting larger and also an area on the left inner breast with erythema and mild induration that is new.  These are areas that could be concerning for involvement.  She will need to have complete staging evaluation including lab work today, ophthalmology evaluation for the eyelid nodule, PET scan, bone marrow aspiration/biopsy.  I discussed her case with Dr. Gordon her surgeon.  He can perform a punch biopsy of the skin on the left breast.  We discussed that treatment for T-cell lymphoma usually involves chemotherapy such as CHOEP or CHOP typically for 6 cycles with consideration of involved site radiation.  Based on response to induction therapy, there is also the possibility for treatment such as high-dose chemotherapy with stem cell rescue i.e. autologous stem cell transplant.  We discussed the need for evaluation of bone marrow transplant center.  She reports that she is already contacted [facility] and has an appointment with them 8/4/2021.  This office will try to expedite her staging evaluation.  I will see her back afterward to review the studies, finalize her stage and IPI and move forward with treatment.  She will have echocardiogram to assess cardiac function prior to anthracycline based therapy.

## 2021-07-20 NOTE — PROGRESS NOTES
Chief Complaint  Lymphoma    Jessi Ennis, JOSE M  RaymonJessi, JOSE M    Subjective          Abiola Garibay presents to [facility] for evaluation of T-cell lymphoma.  Patient reports that she felt a lump in the right groin in May of this year when she hit her leg against the counter.  She notes that it was slightly tender.  She reports it is about 3 inches in largest dimension.  It got a little bit larger and she subsequently sought evaluation.  She was referred to Dr. Gordon with surgery.  She underwent excisional biopsy of the right groin in June.  The specimen was sent to [facility] for expert opinion and returned as positive for peripheral T-cell lymphoma, NOS.  She reports that the right groin incision is healing well.  Approximately 1 week ago, she noticed some swelling in the right leg extending down to the foot.  She has been using compression stocking.  It is slightly tender but not interfering with her normal activities.  She denies any other masses or adenopathy.  She does have a small firm nodule on her right eyelid.  She notes that this has been there for several years but she does feel like it is getting larger.  She also reports a reddened thickened area of the skin of the left lower inner breast.  This was noted a couple of weeks ago.  She has had mammogram which was reportedly normal.  She denies any other changes to the skin.  She denies fever, chills, weight loss, night sweats.  She reports good energy level.  She denies excessive bruising or bleeding.  Because of the new diagnosis of lymphoma, she is referred for evaluation.    Oncology/Hematology History    No history exists.       Review of Systems   Constitutional: Negative for appetite change, diaphoresis, fatigue, fever, unexpected weight gain and unexpected weight loss.   HENT: Positive for swollen glands. Negative for hearing loss, mouth sores, sore throat, trouble swallowing and voice  change.    Eyes: Negative for blurred vision.   Respiratory: Negative for cough, shortness of breath and wheezing.    Cardiovascular: Positive for leg swelling (right). Negative for chest pain and palpitations.   Gastrointestinal: Positive for nausea. Negative for abdominal pain, blood in stool, constipation, diarrhea and vomiting.   Endocrine: Negative for cold intolerance and heat intolerance.   Genitourinary: Negative for difficulty urinating, dysuria, frequency, hematuria and urinary incontinence.   Musculoskeletal: Negative for arthralgias, back pain and myalgias.   Skin: Negative for rash, skin lesions and bruise.   Neurological: Negative for dizziness, seizures, weakness, numbness and headache.   Hematological: Does not bruise/bleed easily.   Psychiatric/Behavioral: Negative for depressed mood. The patient is not nervous/anxious.      Current Outpatient Medications on File Prior to Visit   Medication Sig Dispense Refill   • hydroCHLOROthiazide (HYDRODIURIL) 12.5 MG tablet Take 12.5 mg by mouth Daily.     • norethindrone-ethinyl estradiol (FEMHRT 1/5) 1-5 MG-MCG tablet Take 0.5 tablets by mouth 2 (Two) Times a Day. Combination med of progestrone, estrogen, testosterone, sublingual tab - takes 1/2 in am, 1/2 in pm     • docusate sodium (COLACE) 250 MG capsule Take 1 capsule by mouth 2 (Two) Times a Day As Needed for Constipation. 10 capsule 0   • HYDROcodone-acetaminophen (NORCO) 5-325 MG per tablet Take 1-2 tablets by mouth Every 4 (Four) Hours As Needed (Pain).       No current facility-administered medications on file prior to visit.       Allergies   Allergen Reactions   • Bee Venom Hives   • Lisinopril Unknown - Low Severity     Past Medical History:   Diagnosis Date   • Enlarged lymph node     right inguinal lymphnoid   • High blood pressure    • Swelling of right lower extremity 7/20/2021     Past Surgical History:   Procedure Laterality Date   • ABDOMINAL HYSTERECTOMY      patient still has her ovaries    • BREAST SURGERY      breast reduction (bilateral)    • EXCISION LESION Right 6/9/2021    Procedure: EXCISION OF RIGHT INGUINAL LYMPHNOID;  Surgeon: Aaron Gordon MD;  Location: [facility];  Service: General;  Laterality: Right;     Social History     Socioeconomic History   • Marital status:      Spouse name: Not on file   • Number of children: Not on file   • Years of education: Not on file   • Highest education level: Not on file   Tobacco Use   • Smoking status: Never Smoker   • Smokeless tobacco: Never Used   Substance and Sexual Activity   • Alcohol use: Yes     Comment: rarely    • Drug use: Never     Family History   Problem Relation Age of Onset   • Breast cancer Sister         50S   • Diabetes Paternal Grandmother    • Pancreatic cancer Father        Objective   Physical Exam  Vitals reviewed. Exam conducted with a chaperone present.   Constitutional:       General: She is not in acute distress.     Appearance: Normal appearance.   HENT:      Head: Normocephalic and atraumatic.      Mouth/Throat:      Mouth: Mucous membranes are moist.      Pharynx: No oropharyngeal exudate.      Comments: No tonsillar hypertrophy  Eyes:      Conjunctiva/sclera: Conjunctivae normal.      Pupils: Pupils are equal, round, and reactive to light.      Comments: Right lateral upper eyelid nodule, 5 mm in size, firm, nontender   Cardiovascular:      Rate and Rhythm: Normal rate and regular rhythm.      Heart sounds: Normal heart sounds. No murmur heard.   No gallop.    Pulmonary:      Effort: Pulmonary effort is normal.      Breath sounds: Normal breath sounds.   Chest:       Abdominal:      General: Abdomen is flat. Bowel sounds are normal. There is no distension.      Palpations: Abdomen is soft.      Tenderness: There is no abdominal tenderness.      Comments: No HSM   Musculoskeletal:      Cervical back: Neck supple. No tenderness.      Right lower leg: Edema (diffuse, to the foot. no palpable cord)  "present.      Left lower leg: No edema.   Lymphadenopathy:      Head:      Right side of head: No submental, submandibular, tonsillar, preauricular, posterior auricular or occipital adenopathy.      Left side of head: No submental, submandibular, tonsillar, preauricular, posterior auricular or occipital adenopathy.      Cervical: No cervical adenopathy.      Upper Body:      Right upper body: No supraclavicular, axillary or epitrochlear adenopathy.      Left upper body: No supraclavicular, axillary or epitrochlear adenopathy.      Lower Body: Right inguinal adenopathy (healing inguinal incision. firm area underlying. ) present. No left inguinal adenopathy.   Neurological:      Mental Status: She is alert and oriented to person, place, and time.   Psychiatric:         Mood and Affect: Mood normal.         Behavior: Behavior normal.         Vitals:    07/20/21 0801   BP: 117/63   Pulse: 63   Resp: 16   SpO2: 99%   Weight: 74.3 kg (163 lb 12.8 oz)   Height: 167.6 cm (66\")   PainSc: 0-No pain     ECOG score: 0         PHQ-9 Total Score: 0                  Result Review :   The following data was reviewed by: Ramón Holm MD on 07/20/2021:  Lab Results   Component Value Date    HGB 13.6 07/20/2021    HCT 38.6 07/20/2021    MCV 89.4 07/20/2021     07/20/2021    WBC 4.09 07/20/2021    NEUTROABS 1.88 07/20/2021    LYMPHSABS 1.38 07/20/2021    MONOSABS 0.72 07/20/2021    EOSABS 0.08 07/20/2021    BASOSABS 0.02 07/20/2021     Lab Results   Component Value Date    GLUCOSE 87 07/20/2021    BUN 12 07/20/2021    CREATININE 1.00 07/20/2021     07/20/2021    K 3.4 (L) 07/20/2021     07/20/2021    CO2 27.7 07/20/2021    CALCIUM 9.9 07/20/2021    PROTEINTOT 7.0 07/20/2021    ALBUMIN 4.50 07/20/2021    BILITOT 0.6 07/20/2021    ALKPHOS 48 07/20/2021    AST 39 (H) 07/20/2021    ALT 19 07/20/2021           Assessment and Plan    Diagnoses and all orders for this visit:    1. Peripheral T cell lymphoma of inguinal " region (CMS/[facility]) (Primary)  Assessment & Plan:  Patient presents for evaluation of newly diagnosed peripheral T-cell lymphoma, NOS.  This is identified on excisional biopsy of the right groin.  She does not have any other obvious adenopathy or B symptoms.  She does have a small nodule on the right upper eyelid that she states is getting larger and also an area on the left inner breast with erythema and mild induration that is new.  These are areas that could be concerning for involvement.  She will need to have complete staging evaluation including lab work today, ophthalmology evaluation for the eyelid nodule, PET scan, bone marrow aspiration/biopsy.  I discussed her case with Dr. Gordon her surgeon.  He can perform a punch biopsy of the skin on the left breast.  We discussed that treatment for T-cell lymphoma usually involves chemotherapy such as CHOEP or CHOP typically for 6 cycles with consideration of involved site radiation.  Based on response to induction therapy, there is also the possibility for treatment such as high-dose chemotherapy with stem cell rescue i.e. autologous stem cell transplant.  We discussed the need for evaluation of bone marrow transplant center.  She reports that she is already contacted [facility] and has an appointment with them 8/4/2021.  This office will try to expedite her staging evaluation.  I will see her back afterward to review the studies, finalize her stage and IPI and move forward with treatment.  She will have echocardiogram to assess cardiac function prior to anthracycline based therapy.    Orders:  -     Cancel: CBC & Differential; Future  -     Cancel: Comprehensive Metabolic Panel; Future  -     Lactate Dehydrogenase; Future  -     Uric Acid; Future  -     NM Pet Skull Base To Mid Thigh; Future  -     Cancel: Ambulatory Referral to General Surgery  -     Verify Informed Consent; Standing  -     Vital Signs; Standing  -     Continuous Pulse Oximetry; Standing  -      Notify Physician pre-procedure; Standing  -     Nursing communication; Standing  -     Nursing communication; Standing  -     Cancel: Bone Marrow Comprehensive; Future  -     Cancel: Biopsy Bone Marrow Procedure Order; Future  -     Cancel: Bone Marrow Exam; Future  -     Nursing communication; Standing  -     Nursing communication; Standing  -     Nursing communication; Standing  -     Nursing communication; Standing  -     Nursing communication; Standing  -     Nursing communication; Standing  -     Nursing communication; Standing  -     Nursing communication; Standing  -     CBC & Differential; Standing  -     Insert Peripheral IV; Standing  -     sodium chloride 0.9 % infusion 500 mL  -     Flow Cytometry; Future  -     Cancel: Chromosome Analysis, Bone Marrow; Future  -     Duplex Venous Lower Extremity - Right CAR; Future  -     T-Cell Gene Rearrangement, PCR; Future  -     Verify Informed Consent; Standing  -     Vital Signs; Standing  -     Continuous Pulse Oximetry; Standing  -     Notify Physician pre-procedure; Standing  -     Nursing communication; Standing  -     Nursing communication; Standing  -     Bone Marrow Comprehensive; Future  -     Biopsy Bone Marrow Procedure Order; Future  -     Bone Marrow Exam; Future  -     Nursing communication; Standing  -     Nursing communication; Standing  -     Nursing communication; Standing  -     Nursing communication; Standing  -     Nursing communication; Standing  -     Nursing communication; Standing  -     Nursing communication; Standing  -     Nursing communication; Standing  -     CBC & Differential; Standing  -     Insert Peripheral IV; Standing  -     sodium chloride 0.9 % infusion 500 mL  -     Chromosome Analysis, Bone Marrow; Future  -     Flow Cytometry; Future  -     Echo Complete w/ Doppler, Color Flow and Strain; Future    2. Swelling of right lower extremity  Assessment & Plan:  Patient is status post right excisional biopsy in the groin.   Now with diffuse leg swelling.  This could be lymphedema related to her surgery but cannot exclude the possibility of DVT.  She will have Doppler ultrasound of the right lower extremity.      3. High risk medication use  -     Echo Complete w/ Doppler, Color Flow and Strain; Future          Patient Follow Up: After staging scans      Patient was given instructions and counseling regarding her condition or for health maintenance advice. Please see specific information pulled into the AVS if appropriate.     Ramón Holm MD    7/20/2021

## 2021-07-20 NOTE — ASSESSMENT & PLAN NOTE
Patient is status post right excisional biopsy in the groin.  Now with diffuse leg swelling.  This could be lymphedema related to her surgery but cannot exclude the possibility of DVT.  She will have Doppler ultrasound of the right lower extremity.

## 2021-07-21 ENCOUNTER — HOSPITAL ENCOUNTER (OUTPATIENT)
Dept: PET IMAGING | Facility: HOSPITAL | Age: 56
Discharge: HOME | End: 2021-07-21

## 2021-07-21 ENCOUNTER — PATIENT ROUNDING (BHMG ONLY) (OUTPATIENT)
Dept: ONCOLOGY | Facility: HOSPITAL | Age: 56
End: 2021-07-21

## 2021-07-21 DIAGNOSIS — C84.45 PERIPHERAL T CELL LYMPHOMA OF INGUINAL REGION (HCC): ICD-10-CM

## 2021-07-21 PROCEDURE — A9552 F18 FDG: HCPCS | Performed by: INTERNAL MEDICINE

## 2021-07-21 PROCEDURE — 78815 PET IMAGE W/CT SKULL-THIGH: CPT

## 2021-07-21 PROCEDURE — 0 FLUDEOXYGLUCOSE F18 SOLUTION: Performed by: INTERNAL MEDICINE

## 2021-07-21 RX ADMIN — FLUDEOXYGLUCOSE F18 1 DOSE: 300 INJECTION INTRAVENOUS at 08:18

## 2021-07-21 NOTE — PROGRESS NOTES
July 21, 2021    Hello, may I speak with Abiola Garibay?    My name is Pinky Guy.        I am  with [facility]  [address] [place] [address] [address]  [place] [address] [phone].    Before we get started may I verify your date of birth? 1965    I am calling to officially welcome you to our practice and ask about your recent visit. Is this a good time to talk? No-Voicemail Left    Tell me about your visit with us. What things went well?  No-Voicemail Left       We're always looking for ways to make our patients' experiences even better. Do you have recommendations on ways we may improve?  No-Voicemail Left    Overall were you satisfied with your first visit to our practice? No-Voicemail Left       I appreciate you taking the time to speak with me today. Is there anything else I can do for you? No-Voicemail Left      Thank you, and have a great day.

## 2021-07-22 ENCOUNTER — PREP FOR SURGERY (OUTPATIENT)
Dept: OTHER | Facility: HOSPITAL | Age: 56
End: 2021-07-22

## 2021-07-22 ENCOUNTER — TELEPHONE (OUTPATIENT)
Dept: ONCOLOGY | Facility: HOSPITAL | Age: 56
End: 2021-07-22

## 2021-07-22 ENCOUNTER — TELEPHONE (OUTPATIENT)
Dept: SURGERY | Facility: CLINIC | Age: 56
End: 2021-07-22

## 2021-07-22 ENCOUNTER — HOSPITAL ENCOUNTER (OUTPATIENT)
Dept: CARDIOLOGY | Facility: HOSPITAL | Age: 56
Discharge: HOME | End: 2021-07-22

## 2021-07-22 ENCOUNTER — HOSPITAL ENCOUNTER (OUTPATIENT)
Dept: CT IMAGING | Facility: HOSPITAL | Age: 56
Discharge: HOME | End: 2021-07-22

## 2021-07-22 VITALS
HEIGHT: 66 IN | OXYGEN SATURATION: 99 % | DIASTOLIC BLOOD PRESSURE: 68 MMHG | RESPIRATION RATE: 18 BRPM | WEIGHT: 163 LBS | HEART RATE: 61 BPM | BODY MASS INDEX: 26.2 KG/M2 | SYSTOLIC BLOOD PRESSURE: 115 MMHG

## 2021-07-22 VITALS — BODY MASS INDEX: 26.2 KG/M2 | WEIGHT: 163 LBS | HEIGHT: 66 IN

## 2021-07-22 DIAGNOSIS — Z79.899 HIGH RISK MEDICATION USE: ICD-10-CM

## 2021-07-22 DIAGNOSIS — C84.45 PERIPHERAL T CELL LYMPHOMA OF INGUINAL REGION (HCC): Primary | ICD-10-CM

## 2021-07-22 DIAGNOSIS — C85.90 LYMPHOMA (HCC): Primary | ICD-10-CM

## 2021-07-22 DIAGNOSIS — C84.45 PERIPHERAL T CELL LYMPHOMA OF INGUINAL REGION (HCC): ICD-10-CM

## 2021-07-22 LAB
ASCENDING AORTA: 3 CM
BH CV ECHO MEAS - AO MAX PG: 7 MMHG
BH CV ECHO MEAS - AO MEAN PG: 4 MMHG
BH CV ECHO MEAS - AO ROOT DIAM: 3.1 CM
BH CV ECHO MEAS - EDV(MOD-SP2): 52.8 ML
BH CV ECHO MEAS - EDV(MOD-SP4): 57.5 ML
BH CV ECHO MEAS - EF(MOD-BP): 56.8 %
BH CV ECHO MEAS - EF(MOD-SP2): 57.2 %
BH CV ECHO MEAS - EF(MOD-SP4): 56.7 %
BH CV ECHO MEAS - ESV(MOD-SP2): 22.6 ML
BH CV ECHO MEAS - ESV(MOD-SP4): 24.9 ML
BH CV ECHO MEAS - FS: 37 %
BH CV ECHO MEAS - IVSD: 1 CM
BH CV ECHO MEAS - LA DIMENSION(2D): 3 CM
BH CV ECHO MEAS - LAT PEAK E' VEL: 10.3 CM/SEC
BH CV ECHO MEAS - LVIDD: 4.5 CM
BH CV ECHO MEAS - LVIDS: 2.9 CM
BH CV ECHO MEAS - LVOT DIAM: 2 CM
BH CV ECHO MEAS - LVPWD: 1 CM
BH CV ECHO MEAS - MED PEAK E' VEL: 6.1 CM/SEC
BH CV ECHO MEAS - MV A MAX VEL: 78.8 CM/SEC
BH CV ECHO MEAS - MV DEC TIME: 227 MSEC
BH CV ECHO MEAS - MV E MAX VEL: 63.8 CM/SEC
BH CV ECHO MEAS - MV E/A: 0.8
BH CV ECHO MEAS - RAP SYSTOLE: 3 MMHG
BH CV ECHO MEAS - RVDD: 2.5 CM
BH CV ECHO MEAS - RVSP: 22 MMHG
BH CV ECHO MEAS - SV(MOD-SP2): 30.2 ML
BH CV ECHO MEAS - SV(MOD-SP4): 32.6 ML
BH CV ECHO MEAS - TAPSE (>1.6): 2 CM
BH CV ECHO MEAS - TR MAX PG: 19 MMHG
BH CV ECHO MEASUREMENTS AVERAGE E/E' RATIO: 7.78
DEPRECATED RDW RBC AUTO: 40.4 FL (ref 37–54)
EOSINOPHIL # BLD MANUAL: 0.15 10*3/MM3 (ref 0–0.4)
EOSINOPHIL NFR BLD MANUAL: 3 % (ref 0.3–6.2)
ERYTHROCYTE [DISTWIDTH] IN BLOOD BY AUTOMATED COUNT: 12.2 % (ref 12.3–15.4)
HCT VFR BLD AUTO: 41.7 % (ref 34–46.6)
HGB BLD-MCNC: 14.6 G/DL (ref 12–15.9)
IVRT: 90 MSEC
LARGE PLATELETS: ABNORMAL
LEFT ATRIUM VOLUME INDEX: 20.1 ML/M2
LYMPHOCYTES # BLD MANUAL: 1.43 10*3/MM3 (ref 0.7–3.1)
LYMPHOCYTES NFR BLD MANUAL: 19 % (ref 5–12)
LYMPHOCYTES NFR BLD MANUAL: 24 % (ref 19.6–45.3)
MAXIMAL PREDICTED HEART RATE: 163 BPM
MCH RBC QN AUTO: 31.5 PG (ref 26.6–33)
MCHC RBC AUTO-ENTMCNC: 35 G/DL (ref 31.5–35.7)
MCV RBC AUTO: 89.9 FL (ref 79–97)
MONOCYTES # BLD AUTO: 0.97 10*3/MM3 (ref 0.1–0.9)
NEUTROPHILS # BLD AUTO: 2.56 10*3/MM3 (ref 1.7–7)
NEUTROPHILS NFR BLD MANUAL: 50 % (ref 42.7–76)
PLATELET # BLD AUTO: 191 10*3/MM3 (ref 140–450)
PMV BLD AUTO: 11.4 FL (ref 6–12)
RBC # BLD AUTO: 4.64 10*6/MM3 (ref 3.77–5.28)
RBC MORPH BLD: NORMAL
SCAN SLIDE: NORMAL
SMALL PLATELETS BLD QL SMEAR: ADEQUATE
STRESS TARGET HR: 139 BPM
VARIANT LYMPHS NFR BLD MANUAL: 4 % (ref 0–5)
WBC # BLD AUTO: 5.12 10*3/MM3 (ref 3.4–10.8)
WBC MORPH BLD: NORMAL

## 2021-07-22 PROCEDURE — 88305 TISSUE EXAM BY PATHOLOGIST: CPT | Performed by: INTERNAL MEDICINE

## 2021-07-22 PROCEDURE — 85007 BL SMEAR W/DIFF WBC COUNT: CPT | Performed by: INTERNAL MEDICINE

## 2021-07-22 PROCEDURE — 88264 CHROMOSOME ANALYSIS 20-25: CPT

## 2021-07-22 PROCEDURE — 85025 COMPLETE CBC W/AUTO DIFF WBC: CPT | Performed by: INTERNAL MEDICINE

## 2021-07-22 PROCEDURE — 88341 IMHCHEM/IMCYTCHM EA ADD ANTB: CPT | Performed by: INTERNAL MEDICINE

## 2021-07-22 PROCEDURE — 81342 TRG GENE REARRANGEMENT ANAL: CPT

## 2021-07-22 PROCEDURE — 77012 CT SCAN FOR NEEDLE BIOPSY: CPT

## 2021-07-22 PROCEDURE — 25010000002 ONDANSETRON PER 1 MG: Performed by: RADIOLOGY

## 2021-07-22 PROCEDURE — 88184 FLOWCYTOMETRY/ TC 1 MARKER: CPT

## 2021-07-22 PROCEDURE — 88311 DECALCIFY TISSUE: CPT | Performed by: INTERNAL MEDICINE

## 2021-07-22 PROCEDURE — 88237 TISSUE CULTURE BONE MARROW: CPT

## 2021-07-22 PROCEDURE — 88342 IMHCHEM/IMCYTCHM 1ST ANTB: CPT | Performed by: INTERNAL MEDICINE

## 2021-07-22 PROCEDURE — 25010000002 FENTANYL CITRATE (PF) 50 MCG/ML SOLUTION: Performed by: RADIOLOGY

## 2021-07-22 PROCEDURE — 38221 DX BONE MARROW BIOPSIES: CPT | Performed by: RADIOLOGY

## 2021-07-22 PROCEDURE — 81340 TRB@ GENE REARRANGE AMPLIFY: CPT

## 2021-07-22 PROCEDURE — 93306 TTE W/DOPPLER COMPLETE: CPT

## 2021-07-22 PROCEDURE — 88185 FLOWCYTOMETRY/TC ADD-ON: CPT

## 2021-07-22 PROCEDURE — 88313 SPECIAL STAINS GROUP 2: CPT | Performed by: INTERNAL MEDICINE

## 2021-07-22 RX ORDER — PREDNISONE 50 MG/1
100 TABLET ORAL DAILY
Qty: 10 TABLET | Refills: 5 | Status: SHIPPED | OUTPATIENT
Start: 2021-07-22 | End: 2021-07-27

## 2021-07-22 RX ORDER — ONDANSETRON 2 MG/ML
INJECTION INTRAMUSCULAR; INTRAVENOUS
Status: DISPENSED
Start: 2021-07-22 | End: 2021-07-22

## 2021-07-22 RX ORDER — SODIUM CHLORIDE 9 MG/ML
500 INJECTION, SOLUTION INTRAVENOUS ONCE
Status: DISCONTINUED | OUTPATIENT
Start: 2021-07-22 | End: 2021-07-23 | Stop reason: HOSPADM

## 2021-07-22 RX ORDER — FENTANYL CITRATE 50 UG/ML
INJECTION, SOLUTION INTRAMUSCULAR; INTRAVENOUS
Status: DISPENSED
Start: 2021-07-22 | End: 2021-07-22

## 2021-07-22 RX ORDER — ONDANSETRON HYDROCHLORIDE 8 MG/1
8 TABLET, FILM COATED ORAL 3 TIMES DAILY PRN
Qty: 30 TABLET | Refills: 5 | Status: SHIPPED | OUTPATIENT
Start: 2021-07-22 | End: 2021-11-24

## 2021-07-22 RX ORDER — FENTANYL CITRATE 50 UG/ML
INJECTION, SOLUTION INTRAMUSCULAR; INTRAVENOUS
Status: COMPLETED | OUTPATIENT
Start: 2021-07-22 | End: 2021-07-22

## 2021-07-22 RX ORDER — LIDOCAINE HYDROCHLORIDE 20 MG/ML
INJECTION, SOLUTION INFILTRATION; PERINEURAL
Status: COMPLETED
Start: 2021-07-22 | End: 2021-07-23

## 2021-07-22 RX ORDER — ONDANSETRON 2 MG/ML
INJECTION INTRAMUSCULAR; INTRAVENOUS
Status: COMPLETED | OUTPATIENT
Start: 2021-07-22 | End: 2021-07-22

## 2021-07-22 RX ADMIN — ONDANSETRON 4 MG: 2 INJECTION INTRAMUSCULAR; INTRAVENOUS at 10:52

## 2021-07-22 RX ADMIN — FENTANYL CITRATE 50 MCG: 50 INJECTION INTRAMUSCULAR; INTRAVENOUS at 10:58

## 2021-07-22 NOTE — TELEPHONE ENCOUNTER
Dr. Holm wants to refer asap to Dr. Rob for breast punch biopsy and port before 07/28.      She stated they were referring to Dr. Gordon, and they spoke to April yesterday, but they cannot wait for Dr. Gordon to get back in office.

## 2021-07-22 NOTE — DISCHARGE INSTRUCTIONS
May eat your regular diet, drink plenty of fluids  Follow up with MD as scheduled  No strenuous activity or heavy lifting for one week  No alcohol, driving, or operating heavy machinery for 24hrs  Notify MD or go to the ER if any of the following develop:     Fever above 101    Bleeding at the biopsy site    Shortness of breath, or painful/difficulty breathing    Chest pain    Nausea/Vomiting    Abdominal pain  Monitor for signs/symptoms of infection (redness, fever, drainage) and notify MD if noted.  No tub baths, swimming, soaking for 7 days, you may shower.  Remove dressing in 24 hrs  Resume medications as ordered  Bone Marrow Aspiration and Bone Marrow Biopsy, Adult, Care After  This sheet gives you information about how to care for yourself after your procedure. Your health care provider may also give you more specific instructions. If you have problems or questions, contact your health care provider.  What can I expect after the procedure?  After the procedure, it is common to have:  · Mild pain and tenderness.  · Swelling.  · Bruising.  Follow these instructions at home:  Puncture site care    · Follow instructions from your health care provider about how to take care of the puncture site. Make sure you:  ? Wash your hands with soap and water before and after you change your bandage (dressing). If soap and water are not available, use hand .  ? Change your dressing as told by your health care provider.  · Check your puncture site every day for signs of infection. Check for:  ? More redness, swelling, or pain.  ? Fluid or blood.  ? Warmth.  ? Pus or a bad smell.  Activity  · Return to your normal activities as told by your health care provider. Ask your health care provider what activities are safe for you.  · Do not lift anything that is heavier than 10 lb (4.5 kg), or the limit that you are told, until your health care provider says that it is safe.  · Do not drive for 24 hours if you were given a  sedative during your procedure.  General instructions    · Take over-the-counter and prescription medicines only as told by your health care provider.  · Do not take baths, swim, or use a hot tub until your health care provider approves. Ask your health care provider if you may take showers. You may only be allowed to take sponge baths.  · If directed, put ice on the affected area. To do this:  ? Put ice in a plastic bag.  ? Place a towel between your skin and the bag.  ? Leave the ice on for 20 minutes, 2-3 times a day.  · Keep all follow-up visits as told by your health care provider. This is important.  Contact a health care provider if:  · Your pain is not controlled with medicine.  · You have a fever.  · You have more redness, swelling, or pain around the puncture site.  · You have fluid or blood coming from the puncture site.  · Your puncture site feels warm to the touch.  · You have pus or a bad smell coming from the puncture site.  Summary  · After the procedure, it is common to have mild pain, tenderness, swelling, and bruising.  · Follow instructions from your health care provider about how to take care of the puncture site and what activities are safe for you.  · Take over-the-counter and prescription medicines only as told by your health care provider.  · Contact a health care provider if you have any signs of infection, such as fluid or blood coming from the puncture site.  This information is not intended to replace advice given to you by your health care provider. Make sure you discuss any questions you have with your health care provider.  Document Revised: 05/05/2020 Document Reviewed: 05/05/2020  Elsevier Patient Education © 2021 [employer]

## 2021-07-23 ENCOUNTER — ANESTHESIA EVENT (OUTPATIENT)
Dept: PERIOP | Facility: HOSPITAL | Age: 56
End: 2021-07-23

## 2021-07-23 ENCOUNTER — APPOINTMENT (OUTPATIENT)
Dept: GENERAL RADIOLOGY | Facility: HOSPITAL | Age: 56
End: 2021-07-23

## 2021-07-23 ENCOUNTER — ANESTHESIA (OUTPATIENT)
Dept: PERIOP | Facility: HOSPITAL | Age: 56
End: 2021-07-23

## 2021-07-23 ENCOUNTER — HOSPITAL ENCOUNTER (OUTPATIENT)
Facility: HOSPITAL | Age: 56
Setting detail: HOSPITAL OUTPATIENT SURGERY
Discharge: HOME | End: 2021-07-23
Attending: SURGERY | Admitting: SURGERY

## 2021-07-23 VITALS
RESPIRATION RATE: 16 BRPM | OXYGEN SATURATION: 100 % | HEART RATE: 45 BPM | DIASTOLIC BLOOD PRESSURE: 74 MMHG | SYSTOLIC BLOOD PRESSURE: 145 MMHG | WEIGHT: 164.24 LBS | HEIGHT: 66 IN | BODY MASS INDEX: 26.4 KG/M2 | TEMPERATURE: 97 F

## 2021-07-23 DIAGNOSIS — C84.45 PERIPHERAL T CELL LYMPHOMA OF INGUINAL REGION (HCC): Primary | ICD-10-CM

## 2021-07-23 DIAGNOSIS — C85.90 LYMPHOMA (HCC): ICD-10-CM

## 2021-07-23 PROCEDURE — 25010000002 ONDANSETRON PER 1 MG: Performed by: NURSE ANESTHETIST, CERTIFIED REGISTERED

## 2021-07-23 PROCEDURE — 25010000002 PHENYLEPHRINE 10 MG/ML SOLUTION: Performed by: NURSE ANESTHETIST, CERTIFIED REGISTERED

## 2021-07-23 PROCEDURE — 11104 PUNCH BX SKIN SINGLE LESION: CPT | Performed by: SPECIALIST/TECHNOLOGIST, OTHER

## 2021-07-23 PROCEDURE — 88341 IMHCHEM/IMCYTCHM EA ADD ANTB: CPT | Performed by: SURGERY

## 2021-07-23 PROCEDURE — 25010000003 CEFAZOLIN IN DEXTROSE 2-4 GM/100ML-% SOLUTION: Performed by: SURGERY

## 2021-07-23 PROCEDURE — 76000 FLUOROSCOPY <1 HR PHYS/QHP: CPT

## 2021-07-23 PROCEDURE — 77001 FLUOROGUIDE FOR VEIN DEVICE: CPT

## 2021-07-23 PROCEDURE — 25010000002 FENTANYL CITRATE (PF) 50 MCG/ML SOLUTION: Performed by: NURSE ANESTHETIST, CERTIFIED REGISTERED

## 2021-07-23 PROCEDURE — 11104 PUNCH BX SKIN SINGLE LESION: CPT | Performed by: SURGERY

## 2021-07-23 PROCEDURE — 71045 X-RAY EXAM CHEST 1 VIEW: CPT

## 2021-07-23 PROCEDURE — 36561 INSERT TUNNELED CV CATH: CPT | Performed by: SPECIALIST/TECHNOLOGIST, OTHER

## 2021-07-23 PROCEDURE — 25010000002 HEPARIN (PORCINE) PER 1000 UNITS: Performed by: SURGERY

## 2021-07-23 PROCEDURE — 25010000002 PROPOFOL 10 MG/ML EMULSION: Performed by: NURSE ANESTHETIST, CERTIFIED REGISTERED

## 2021-07-23 PROCEDURE — C1788 PORT, INDWELLING, IMP: HCPCS | Performed by: SURGERY

## 2021-07-23 PROCEDURE — 88305 TISSUE EXAM BY PATHOLOGIST: CPT | Performed by: SURGERY

## 2021-07-23 PROCEDURE — 25010000002 MIDAZOLAM PER 1MG: Performed by: ANESTHESIOLOGY

## 2021-07-23 PROCEDURE — 36561 INSERT TUNNELED CV CATH: CPT | Performed by: SURGERY

## 2021-07-23 PROCEDURE — 88342 IMHCHEM/IMCYTCHM 1ST ANTB: CPT | Performed by: SURGERY

## 2021-07-23 DEVICE — POWERPORT ISP M.R.I. IMPLANTABLE PORT WITH ALTERNATIVE SEPTUM AND ATTACHABLE 8F CHRONOFLEX OPEN-ENDED SINGLE-LUMEN VENOUS CATHETER INTERMEDIATE KIT (WITHOUT SUTURE-PLUGS)
Type: IMPLANTABLE DEVICE | Site: CHEST | Status: FUNCTIONAL
Brand: POWERPORT M.R.I., CHRONOFLEX

## 2021-07-23 RX ORDER — LIDOCAINE HYDROCHLORIDE 20 MG/ML
INJECTION, SOLUTION INFILTRATION; PERINEURAL AS NEEDED
Status: DISCONTINUED | OUTPATIENT
Start: 2021-07-23 | End: 2021-07-23 | Stop reason: SURG

## 2021-07-23 RX ORDER — ACETAMINOPHEN 500 MG
1000 TABLET ORAL ONCE
Status: COMPLETED | OUTPATIENT
Start: 2021-07-23 | End: 2021-07-23

## 2021-07-23 RX ORDER — CEFAZOLIN SODIUM 2 G/100ML
2 INJECTION, SOLUTION INTRAVENOUS ONCE
Status: COMPLETED | OUTPATIENT
Start: 2021-07-23 | End: 2021-07-23

## 2021-07-23 RX ORDER — MAGNESIUM HYDROXIDE 1200 MG/15ML
LIQUID ORAL AS NEEDED
Status: DISCONTINUED | OUTPATIENT
Start: 2021-07-23 | End: 2021-07-23 | Stop reason: HOSPADM

## 2021-07-23 RX ORDER — HYDROCODONE BITARTRATE AND ACETAMINOPHEN 5; 325 MG/1; MG/1
1-2 TABLET ORAL EVERY 4 HOURS PRN
Qty: 20 TABLET | Refills: 0 | Status: SHIPPED | OUTPATIENT
Start: 2021-07-23 | End: 2021-11-24

## 2021-07-23 RX ORDER — PROMETHAZINE HYDROCHLORIDE 12.5 MG/1
25 TABLET ORAL ONCE AS NEEDED
Status: DISCONTINUED | OUTPATIENT
Start: 2021-07-23 | End: 2021-07-23 | Stop reason: HOSPADM

## 2021-07-23 RX ORDER — SODIUM CHLORIDE, SODIUM LACTATE, POTASSIUM CHLORIDE, CALCIUM CHLORIDE 600; 310; 30; 20 MG/100ML; MG/100ML; MG/100ML; MG/100ML
9 INJECTION, SOLUTION INTRAVENOUS CONTINUOUS PRN
Status: DISCONTINUED | OUTPATIENT
Start: 2021-07-23 | End: 2021-07-23 | Stop reason: HOSPADM

## 2021-07-23 RX ORDER — PHENYLEPHRINE HYDROCHLORIDE 10 MG/ML
INJECTION INTRAVENOUS AS NEEDED
Status: DISCONTINUED | OUTPATIENT
Start: 2021-07-23 | End: 2021-07-23 | Stop reason: SURG

## 2021-07-23 RX ORDER — OXYCODONE HYDROCHLORIDE 5 MG/1
5 TABLET ORAL
Status: DISCONTINUED | OUTPATIENT
Start: 2021-07-23 | End: 2021-07-23 | Stop reason: HOSPADM

## 2021-07-23 RX ORDER — ONDANSETRON 4 MG/1
4 TABLET, FILM COATED ORAL ONCE AS NEEDED
Status: DISCONTINUED | OUTPATIENT
Start: 2021-07-23 | End: 2021-07-23 | Stop reason: HOSPADM

## 2021-07-23 RX ORDER — FENTANYL CITRATE 50 UG/ML
INJECTION, SOLUTION INTRAMUSCULAR; INTRAVENOUS AS NEEDED
Status: DISCONTINUED | OUTPATIENT
Start: 2021-07-23 | End: 2021-07-23 | Stop reason: SURG

## 2021-07-23 RX ORDER — MIDAZOLAM HYDROCHLORIDE 2 MG/2ML
2 INJECTION, SOLUTION INTRAMUSCULAR; INTRAVENOUS ONCE
Status: COMPLETED | OUTPATIENT
Start: 2021-07-23 | End: 2021-07-23

## 2021-07-23 RX ORDER — PROPOFOL 10 MG/ML
VIAL (ML) INTRAVENOUS AS NEEDED
Status: DISCONTINUED | OUTPATIENT
Start: 2021-07-23 | End: 2021-07-23 | Stop reason: SURG

## 2021-07-23 RX ORDER — ONDANSETRON 2 MG/ML
INJECTION INTRAMUSCULAR; INTRAVENOUS AS NEEDED
Status: DISCONTINUED | OUTPATIENT
Start: 2021-07-23 | End: 2021-07-23 | Stop reason: SURG

## 2021-07-23 RX ORDER — ONDANSETRON 2 MG/ML
4 INJECTION INTRAMUSCULAR; INTRAVENOUS ONCE AS NEEDED
Status: DISCONTINUED | OUTPATIENT
Start: 2021-07-23 | End: 2021-07-23 | Stop reason: HOSPADM

## 2021-07-23 RX ORDER — PROMETHAZINE HYDROCHLORIDE 25 MG/1
25 SUPPOSITORY RECTAL ONCE AS NEEDED
Status: DISCONTINUED | OUTPATIENT
Start: 2021-07-23 | End: 2021-07-23 | Stop reason: HOSPADM

## 2021-07-23 RX ORDER — MEPERIDINE HYDROCHLORIDE 25 MG/ML
12.5 INJECTION INTRAMUSCULAR; INTRAVENOUS; SUBCUTANEOUS
Status: DISCONTINUED | OUTPATIENT
Start: 2021-07-23 | End: 2021-07-23 | Stop reason: HOSPADM

## 2021-07-23 RX ADMIN — SODIUM CHLORIDE, POTASSIUM CHLORIDE, SODIUM LACTATE AND CALCIUM CHLORIDE 9 ML/HR: 600; 310; 30; 20 INJECTION, SOLUTION INTRAVENOUS at 10:25

## 2021-07-23 RX ADMIN — CEFAZOLIN SODIUM 2 G: 2 INJECTION, SOLUTION INTRAVENOUS at 10:53

## 2021-07-23 RX ADMIN — PHENYLEPHRINE HYDROCHLORIDE 100 MCG: 10 INJECTION INTRAVENOUS at 10:42

## 2021-07-23 RX ADMIN — ACETAMINOPHEN 1000 MG: 500 TABLET ORAL at 10:25

## 2021-07-23 RX ADMIN — ONDANSETRON 4 MG: 2 INJECTION INTRAMUSCULAR; INTRAVENOUS at 10:55

## 2021-07-23 RX ADMIN — FENTANYL CITRATE 100 MCG: 50 INJECTION INTRAMUSCULAR; INTRAVENOUS at 10:40

## 2021-07-23 RX ADMIN — LIDOCAINE HYDROCHLORIDE 50 MG: 20 INJECTION, SOLUTION INFILTRATION; PERINEURAL at 10:40

## 2021-07-23 RX ADMIN — MIDAZOLAM HYDROCHLORIDE 2 MG: 1 INJECTION, SOLUTION INTRAMUSCULAR; INTRAVENOUS at 10:30

## 2021-07-23 RX ADMIN — PROPOFOL 150 MG: 10 INJECTION, EMULSION INTRAVENOUS at 10:41

## 2021-07-23 NOTE — ANESTHESIA POSTPROCEDURE EVALUATION
Patient: Abiola Garibay    Procedure Summary     Date: 07/23/21 Room / Location: [facility] OR  / [facility]    Anesthesia Start: 1038 Anesthesia Stop: 1110    Procedures:       INSERTION OF PORTACATH (Right )      BREAST BIOPSY BIOPSY OF SKIN, SUBCUTANEOUS TISSUE AND/OR MUCOUS MEMBRANE of the left breast (Left Breast) Diagnosis:       Lymphoma (CMS/HCC)      (Lymphoma (CMS/HCC) [C85.90])    Surgeons: Grace Rob MD Provider: Armond Hart MD    Anesthesia Type: general ASA Status: 2          Anesthesia Type: general    Vitals  Vitals Value Taken Time   /74 07/23/21 1215   Temp 36.1 °C (97 °F) 07/23/21 1120   Pulse 48 07/23/21 1217   Resp 16 07/23/21 1215   SpO2 100 % 07/23/21 1217   Vitals shown include unvalidated device data.        Post Anesthesia Care and Evaluation    Patient location during evaluation: bedside  Patient participation: complete - patient participated  Level of consciousness: awake  Pain score: 0  Pain management: adequate  Airway patency: patent  Anesthetic complications: No anesthetic complications  PONV Status: none  Cardiovascular status: acceptable and stable  Respiratory status: acceptable and room air  Hydration status: acceptable    Comments: An Anesthesiologist personally participated in the most demanding procedures (including induction and emergence if applicable) in the anesthesia plan, monitored the course of anesthesia administration at frequent intervals and remained physically present and available for immediate diagnosis and treatment of emergencies.

## 2021-07-23 NOTE — ANESTHESIA PREPROCEDURE EVALUATION
Anesthesia Evaluation     Patient summary reviewed and Nursing notes reviewed   no history of anesthetic complications:  NPO Solid Status: > 8 hours  NPO Liquid Status: > 2 hours           Airway   Mallampati: I  TM distance: >3 FB  Neck ROM: full  No difficulty expected  Dental      Pulmonary - negative pulmonary ROS and normal exam    breath sounds clear to auscultation  Cardiovascular - normal exam  Exercise tolerance: good (4-7 METS)    Rhythm: regular    (+) hypertension, DVT,       Neuro/Psych- negative ROS  GI/Hepatic/Renal/Endo - negative ROS     Musculoskeletal (-) negative ROS    Abdominal    Substance History - negative use     OB/GYN negative ob/gyn ROS         Other      history of cancer                    Anesthesia Plan    ASA 2     general   (Patient understands anesthesia not responsible for dental damage.)  intravenous induction     Anesthetic plan, all risks, benefits, and alternatives have been provided, discussed and informed consent has been obtained with: patient.  Use of blood products discussed with patient .   Plan discussed with CRNA.

## 2021-07-27 ENCOUNTER — OFFICE VISIT (OUTPATIENT)
Dept: ONCOLOGY | Facility: HOSPITAL | Age: 56
End: 2021-07-27

## 2021-07-27 VITALS
DIASTOLIC BLOOD PRESSURE: 63 MMHG | OXYGEN SATURATION: 100 % | RESPIRATION RATE: 18 BRPM | BODY MASS INDEX: 27.11 KG/M2 | TEMPERATURE: 97.7 F | WEIGHT: 167.99 LBS | SYSTOLIC BLOOD PRESSURE: 134 MMHG | HEART RATE: 61 BPM

## 2021-07-27 DIAGNOSIS — C84.45 PERIPHERAL T CELL LYMPHOMA OF INGUINAL REGION (HCC): Primary | ICD-10-CM

## 2021-07-27 DIAGNOSIS — C85.90 LYMPHOMA, UNSPECIFIED BODY REGION, UNSPECIFIED LYMPHOMA TYPE (HCC): ICD-10-CM

## 2021-07-27 PROBLEM — Z45.2 ENCOUNTER FOR ADJUSTMENT OR MANAGEMENT OF VASCULAR ACCESS DEVICE: Status: ACTIVE | Noted: 2021-07-27

## 2021-07-27 LAB
CYTO UR: NORMAL
LAB AP CASE REPORT: NORMAL
LAB AP CLINICAL INFORMATION: NORMAL
LAB AP DIAGNOSIS COMMENT: NORMAL
LAB AP SPECIAL STAINS: NORMAL
PATH REPORT.FINAL DX SPEC: NORMAL
PATH REPORT.GROSS SPEC: NORMAL

## 2021-07-27 PROCEDURE — 99215 OFFICE O/P EST HI 40 MIN: CPT | Performed by: NURSE PRACTITIONER

## 2021-07-27 PROCEDURE — G0463 HOSPITAL OUTPT CLINIC VISIT: HCPCS | Performed by: NURSE PRACTITIONER

## 2021-07-27 RX ORDER — PALONOSETRON 0.05 MG/ML
0.25 INJECTION, SOLUTION INTRAVENOUS ONCE
Status: CANCELLED | OUTPATIENT
Start: 2021-07-28

## 2021-07-27 RX ORDER — SODIUM CHLORIDE 9 MG/ML
250 INJECTION, SOLUTION INTRAVENOUS ONCE
Status: CANCELLED | OUTPATIENT
Start: 2021-07-28

## 2021-07-27 RX ORDER — SODIUM CHLORIDE 9 MG/ML
250 INJECTION, SOLUTION INTRAVENOUS ONCE
Status: CANCELLED | OUTPATIENT
Start: 2021-07-29

## 2021-07-27 RX ORDER — DOXORUBICIN HYDROCHLORIDE 2 MG/ML
50 INJECTION, SOLUTION INTRAVENOUS ONCE
Status: CANCELLED | OUTPATIENT
Start: 2021-07-28

## 2021-07-27 RX ORDER — SODIUM CHLORIDE 9 MG/ML
250 INJECTION, SOLUTION INTRAVENOUS ONCE
Status: CANCELLED | OUTPATIENT
Start: 2021-07-30

## 2021-07-27 NOTE — PROGRESS NOTES
CHEMOTHERAPY PREPARATION    Abiola Garibay  [id number]  1965    Chief Complaint: chemo education     History of present illness:  Abiola Garibay is a 56 y.o. year old female who is here today for chemotherapy preparation and needs assessment.  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     Oncology History:    Oncology/Hematology History   Peripheral T cell lymphoma of inguinal region (CMS/HCC)   7/20/2021 Initial Diagnosis    Peripheral T cell lymphoma of inguinal region (CMS/HCC)     7/28/2021 -  Chemotherapy    OP LYMPHOMA CHOEP DOXOrubicin / VinCRIStine / Etoposide / Cyclophosphamide / PredniSONE         The current medication list and allergy list were reviewed and reconciled.     Past Medical History, Past Surgical History, Social History, Family History have been reviewed and are without significant changes except as mentioned.    Review of Systems:    Review of Systems   Constitutional: Positive for fatigue.   Respiratory: Negative for cough.    Cardiovascular: Positive for leg swelling (right leg). Negative for chest pain.   Gastrointestinal: Negative for diarrhea.   Musculoskeletal: Positive for joint swelling. Negative for back pain.   Neurological: Negative for headaches.       Physical Exam:    Vitals:    07/27/21 1003   BP: 134/63   Pulse: 61   Resp: 18   Temp: 97.7 °F (36.5 °C)   SpO2: 100%     Vitals:    07/27/21 1003   PainSc: 0-No pain          ECOG: (0) Fully Active - Able to Carry On All Pre-disease Performance Without Restriction    General: well appearing, in no acute distress  Cardiovascular: regular rate and rhythm, no murmurs noted  Lungs: clear to auscultation bilaterally, respirations even and unlabored  Extremities: no lower extremity edema  Skin: no rashes, lesions, bruising, or petechiae  Psych: Mood is stable            NEEDS ASSESSMENTS    VAD Assessment  The patient and I  discussed planned intervenous chemotherapy as well as other IV treatments that are often needed throughout the course of treatment. These may include, but are not limited to blood transfusions, antibiotics, and IV hydration. The vasculature does not appear to be adequate for multiple peripheral IVs throughout their treatment course. Discussed risks and benefits of VADs. The patient would like to pursue Port-A-Cath insertion prior to initiation of treatment.       Palliative Care  The patient and I discussed palliative care services. Palliative care is not the same as Hospice care. This is specialized medical care for people living with serious illness with the goal of improving quality of life for the patient and their family. [facility] has partnered with [facility] to offer our patients outpatient palliative care early along with their treatment to assist in coordination of care, symptom management, pain management, and medical decision making.  Oncology criteria for palliative care referral is not met at this time. The patient is not interested in a palliative care consultation.     Additional Referral needs  none      CHEMOTHERAPY EDUCATION    Booklets Given: Chemotherapy and You [x]  Eating Hints [x]    Sexuality/Fertility Books []      Chemotherapy/Biotherapy Education Sheets: (list all that apply)  nausea management, acid reflux management, diarrhea management, Cancer resourse contacts information, skin and mouth care and vaccination information                                                                                                                                                                 Chemotherapy Regimen:   Treatment Plans     Name Type Hold Status Plan Dates Plan Provider       Active    OP LYMPHOMA CHOEP DOXOrubicin / VinCRIStine / Etoposide / Cyclophosphamide / PredniSONE ONCOLOGY TREATMENT On Automatic Hold  7/22/2021 - Present Ramón Holm MD                   TOPICS  EDUCATION PROVIDED COMMENTS   ANEMIA:  role of RBC, cause, s/s, ways to manage, role of transfusion [x]    THROMBOCYTOPENIA:  role of platelet, cause, s/s, ways to prevent bleeding, things to avoid, when to seek help [x]    NEUTROPENIA:  role of WBC, cause, infection precautions, s/s of infection, when to call MD [x]    NUTRITION & APPETITE CHANGES:  importance of maintaining healthy diet & weight, ways to manage to improve intake, dietary consult, exercise regimen [x]    DIARRHEA:  causes, s/s of dehydration, ways to manage, dietary changes, when to call MD [x]    CONSTIPATION:  causes, ways to manage, dietary changes, when to call MD [x]    NAUSEA & VOMITING:  cause, use of antiemetics, dietary changes, when to call MD [x]    MOUTH SORES:  causes, oral care, ways to manage [x]    ALOPECIA:  cause, ways to manage, resources [x]    INFERTILITY & SEXUALITY:  causes, fertility preservation options, sexuality changes, ways to manage, importance of birth control [x]    NERVOUS SYSTEM CHANGES:  causes, s/s, neuropathies, cognitive changes, ways to manage [x]    PAIN:  causes, ways to manage [x]    SKIN & NAIL CHANGES:  cause, s/s, ways to manage [x]    ORGAN TOXICITIES:  cause, s/s, need for diagnostic tests, labs, when to notify MD [x]    SURVIVORSHIP:  distress, distress assessment, secondary malignancies, early/late effects, follow-up, social issues, social support []    HOME CARE:  use of spill kits, storing of PO chemo, how to manage bodily fluids []    MISCELLANEOUS:  drug interactions, administration, vesicant, et [x]        Assessment and Plan:    Diagnoses and all orders for this visit:    1. Lymphoma, unspecified body region, unspecified lymphoma type (CMS/HCC)       The patient and I have reviewed their cancer diagnosis and scheduled treatment plan. Needs assessment was completed including genetics, psychosocial needs, barriers to care, VAD evaluation, advanced care planning, and palliative care services.  Referrals have been ordered as appropriate based upon our evaluation and patient desires.     Chemotherapy teaching was also completed today as documented above. Adequate time was given to answer all questions to her satisfaction. Patient and family are aware of their care team members and contact information if they have questions or problems throughout the treatment course. The patient is adequately prepared to begin treatment as scheduled.     Reviewed with patient education regarding EMLA cream, dexamethasone and Zofran prescriptions sent to pharmacy.       I advised the patient that she can take Tylenol or Ibuprofen as needed for aches/pains related to cancer/treatment.  I also advised that her can use Senakot or Miralax as needed for constipation or Imodium as needed for diarrhea.       I reviewed with the patient the care team members. I also reviewed the option of the urgent care clinic through our oncology office for evaluation and management of symptoms related to treatment.    I spent 50 minutes caring for Abiola on this date of service. This time includes time spent by me in the following activities: preparing for the visit, reviewing tests, obtaining and/or reviewing a separately obtained history, performing a medically appropriate examination and/or evaluation, counseling and educating the patient/family/caregiver, ordering medications, tests, or procedures, referring and communicating with other health care professionals, documenting information in the medical record, independently interpreting results and communicating that information with the patient/family/caregiver and care coordination.     JOSE M Worthington

## 2021-07-28 ENCOUNTER — OFFICE VISIT (OUTPATIENT)
Dept: ONCOLOGY | Facility: HOSPITAL | Age: 56
End: 2021-07-28

## 2021-07-28 ENCOUNTER — TELEPHONE (OUTPATIENT)
Dept: NUTRITION | Facility: HOSPITAL | Age: 56
End: 2021-07-28

## 2021-07-28 ENCOUNTER — DOCUMENTATION (OUTPATIENT)
Dept: ONCOLOGY | Facility: HOSPITAL | Age: 56
End: 2021-07-28

## 2021-07-28 ENCOUNTER — HOSPITAL ENCOUNTER (OUTPATIENT)
Dept: ONCOLOGY | Facility: HOSPITAL | Age: 56
Setting detail: INFUSION SERIES
Discharge: HOME | End: 2021-07-28

## 2021-07-28 VITALS
WEIGHT: 169.97 LBS | DIASTOLIC BLOOD PRESSURE: 70 MMHG | OXYGEN SATURATION: 100 % | TEMPERATURE: 97.9 F | BODY MASS INDEX: 27.43 KG/M2 | RESPIRATION RATE: 16 BRPM | SYSTOLIC BLOOD PRESSURE: 119 MMHG | HEART RATE: 68 BPM

## 2021-07-28 VITALS — BODY MASS INDEX: 28.29 KG/M2 | HEIGHT: 65 IN

## 2021-07-28 DIAGNOSIS — C84.45 PERIPHERAL T CELL LYMPHOMA OF INGUINAL REGION (HCC): Primary | ICD-10-CM

## 2021-07-28 DIAGNOSIS — C84.45 PERIPHERAL T CELL LYMPHOMA OF INGUINAL REGION (HCC): ICD-10-CM

## 2021-07-28 DIAGNOSIS — Z45.2 ENCOUNTER FOR ADJUSTMENT OR MANAGEMENT OF VASCULAR ACCESS DEVICE: Primary | ICD-10-CM

## 2021-07-28 PROBLEM — I82.4Y1 ACUTE DEEP VEIN THROMBOSIS (DVT) OF PROXIMAL VEIN OF RIGHT LOWER EXTREMITY (HCC): Status: ACTIVE | Noted: 2021-07-28

## 2021-07-28 PROBLEM — C85.90 LYMPHOMA: Status: RESOLVED | Noted: 2021-07-22 | Resolved: 2021-07-28

## 2021-07-28 LAB
REF LAB TEST METHOD: NORMAL
REF LAB TEST METHOD: NORMAL

## 2021-07-28 PROCEDURE — 25010000002 ETOPOSIDE 1 GM/50ML SOLUTION 50 ML VIAL: Performed by: INTERNAL MEDICINE

## 2021-07-28 PROCEDURE — 96367 TX/PROPH/DG ADDL SEQ IV INF: CPT

## 2021-07-28 PROCEDURE — 25010000002 DEXAMETHASONE SODIUM PHOSPHATE 120 MG/30ML SOLUTION: Performed by: INTERNAL MEDICINE

## 2021-07-28 PROCEDURE — 25010000002 DOXORUBICIN PER 10 MG: Performed by: INTERNAL MEDICINE

## 2021-07-28 PROCEDURE — 99215 OFFICE O/P EST HI 40 MIN: CPT | Performed by: INTERNAL MEDICINE

## 2021-07-28 PROCEDURE — 25010000002 VINCRISTINE PER 1 MG: Performed by: INTERNAL MEDICINE

## 2021-07-28 PROCEDURE — 25010000002 PALONOSETRON PER 25 MCG: Performed by: INTERNAL MEDICINE

## 2021-07-28 PROCEDURE — 25010000002 FOSAPREPITANT PER 1 MG: Performed by: INTERNAL MEDICINE

## 2021-07-28 PROCEDURE — G0463 HOSPITAL OUTPT CLINIC VISIT: HCPCS

## 2021-07-28 PROCEDURE — 25010000002 HEPARIN LOCK FLUSH PER 10 UNITS: Performed by: INTERNAL MEDICINE

## 2021-07-28 PROCEDURE — 96413 CHEMO IV INFUSION 1 HR: CPT

## 2021-07-28 PROCEDURE — 96417 CHEMO IV INFUS EACH ADDL SEQ: CPT

## 2021-07-28 PROCEDURE — 96375 TX/PRO/DX INJ NEW DRUG ADDON: CPT

## 2021-07-28 PROCEDURE — 96411 CHEMO IV PUSH ADDL DRUG: CPT

## 2021-07-28 PROCEDURE — 25010000002 CYCLOPHOSPHAMIDE PER 100 MG: Performed by: INTERNAL MEDICINE

## 2021-07-28 RX ORDER — SODIUM CHLORIDE 0.9 % (FLUSH) 0.9 %
20 SYRINGE (ML) INJECTION AS NEEDED
Status: DISCONTINUED | OUTPATIENT
Start: 2021-07-28 | End: 2021-07-29 | Stop reason: HOSPADM

## 2021-07-28 RX ORDER — PALONOSETRON 0.05 MG/ML
0.25 INJECTION, SOLUTION INTRAVENOUS ONCE
Status: COMPLETED | OUTPATIENT
Start: 2021-07-28 | End: 2021-07-28

## 2021-07-28 RX ORDER — HEPARIN SODIUM (PORCINE) LOCK FLUSH IV SOLN 100 UNIT/ML 100 UNIT/ML
500 SOLUTION INTRAVENOUS AS NEEDED
Status: DISCONTINUED | OUTPATIENT
Start: 2021-07-28 | End: 2021-07-29 | Stop reason: HOSPADM

## 2021-07-28 RX ORDER — DOXORUBICIN HYDROCHLORIDE 2 MG/ML
50 INJECTION, SOLUTION INTRAVENOUS ONCE
Status: COMPLETED | OUTPATIENT
Start: 2021-07-28 | End: 2021-07-28

## 2021-07-28 RX ORDER — PROCHLORPERAZINE MALEATE 10 MG
10 TABLET ORAL EVERY 6 HOURS PRN
Qty: 30 TABLET | Refills: 3 | Status: SHIPPED | OUTPATIENT
Start: 2021-07-28

## 2021-07-28 RX ORDER — ALLOPURINOL 100 MG/1
300 TABLET ORAL DAILY
Qty: 30 TABLET | Refills: 3 | Status: SHIPPED | OUTPATIENT
Start: 2021-07-28 | End: 2021-08-18

## 2021-07-28 RX ORDER — SODIUM CHLORIDE 0.9 % (FLUSH) 0.9 %
20 SYRINGE (ML) INJECTION AS NEEDED
Status: CANCELLED | OUTPATIENT
Start: 2021-07-28

## 2021-07-28 RX ORDER — SODIUM CHLORIDE 9 MG/ML
250 INJECTION, SOLUTION INTRAVENOUS ONCE
Status: COMPLETED | OUTPATIENT
Start: 2021-07-28 | End: 2021-07-28

## 2021-07-28 RX ORDER — HEPARIN SODIUM (PORCINE) LOCK FLUSH IV SOLN 100 UNIT/ML 100 UNIT/ML
500 SOLUTION INTRAVENOUS AS NEEDED
Status: CANCELLED | OUTPATIENT
Start: 2021-07-28

## 2021-07-28 RX ADMIN — DOXORUBICIN HYDROCHLORIDE 92 MG: 2 INJECTION, SOLUTION INTRAVENOUS at 10:04

## 2021-07-28 RX ADMIN — PALONOSETRON HYDROCHLORIDE 0.25 MG: 0.25 INJECTION, SOLUTION INTRAVENOUS at 09:05

## 2021-07-28 RX ADMIN — CYCLOPHOSPHAMIDE 1370 MG: 1 INJECTION, POWDER, FOR SOLUTION INTRAVENOUS; ORAL at 11:47

## 2021-07-28 RX ADMIN — DEXAMETHASONE SODIUM PHOSPHATE 12 MG: 4 INJECTION, SOLUTION INTRA-ARTICULAR; INTRALESIONAL; INTRAMUSCULAR; INTRAVENOUS; SOFT TISSUE at 09:13

## 2021-07-28 RX ADMIN — SODIUM CHLORIDE 250 ML: 9 INJECTION, SOLUTION INTRAVENOUS at 09:02

## 2021-07-28 RX ADMIN — HEPARIN SODIUM (PORCINE) LOCK FLUSH IV SOLN 100 UNIT/ML 500 UNITS: 100 SOLUTION at 12:26

## 2021-07-28 RX ADMIN — SODIUM CHLORIDE 100 ML: 9 INJECTION, SOLUTION INTRAVENOUS at 09:33

## 2021-07-28 RX ADMIN — ETOPOSIDE 180 MG: 20 INJECTION INTRAVENOUS at 10:41

## 2021-07-28 RX ADMIN — SODIUM CHLORIDE, PRESERVATIVE FREE 20 ML: 5 INJECTION INTRAVENOUS at 12:26

## 2021-07-28 RX ADMIN — VINCRISTINE SULFATE 2 MG: 1 INJECTION, SOLUTION INTRAVENOUS at 10:19

## 2021-07-28 NOTE — PROGRESS NOTES
Chief Complaint  No chief complaint on file.    Subjective          History of Present Illness  The patient is here to follow up on port placement and left breast punch biopsy..  They are doing well and have no complaints.  Pathology as shown below:  Clinical Information    Comment:    Lymphoma (CMS/HCC)      Final Diagnosis   Skin, left breast, punch biopsy:               -   Atypical lymphoid infiltrate, consistent with involvement by patient's known peripheral T-cell lymphoma, NOS       Objective   Vital Signs:   There were no vitals taken for this visit.    Physical Exam  Vitals and nursing note reviewed.   Constitutional:       General: She is not in acute distress.     Appearance: Normal appearance. She is well-developed.   HENT:      Head: Normocephalic and atraumatic.   Eyes:      Extraocular Movements: Extraocular movements intact.      Pupils: Pupils are equal, round, and reactive to light.   Cardiovascular:      Pulses: Normal pulses.   Pulmonary:      Effort: Pulmonary effort is normal. No retractions.      Breath sounds: Normal air entry. No wheezing.   Abdominal:      General: There is no distension.      Palpations: Abdomen is soft.      Tenderness: There is no abdominal tenderness.      Hernia: No hernia is present.   Musculoskeletal:         General: No swelling or deformity.      Cervical back: Neck supple.   Skin:     General: Skin is warm and dry.      Findings: No erythema.      Comments: Surgical Incision Healing Well   Neurological:      General: No focal deficit present.      Mental Status: She is alert and oriented to person, place, and time.      Motor: Motor function is intact.   Psychiatric:         Mood and Affect: Mood normal.         Thought Content: Thought content normal.            Result Review :{Labs  Result Review  Imaging  Med Tab  Media  Procedures :23}                 Assessment and Plan    Diagnoses and all orders for this visit:    1. Peripheral T cell lymphoma of  inguinal region (CMS/HCC) (Primary)    Keep follow-up with Dr. Holm    Follow Up   No follow-ups on file.  Patient was given instructions and counseling regarding her condition or for health maintenance advice. Please see specific information pulled into the AVS if appropriate.

## 2021-07-28 NOTE — PROGRESS NOTES
Chief Complaint  Follow-up and Results    Jessi Ennis, JOSE M  RaymonJessi bernard, JOSE M    Lamin Garibay presents to [facility] for ongoing follow-up and treatment of her T-cell lymphoma. This was identified on excisional biopsy of the right inguinal area. Since her last visit, she has undergone complete staging and presents today for discussion and to initiate therapy. She is also found to have a right lower extremity DVT. She is now on Eliquis. She is taking it daily as prescribed. She denies excessive bruising or bleeding. She notes that the right leg is still very swollen and mildly painful to her. She is still able to perform all of her ADLs. She denies any masses or adenopathy. She denies fever, chills, weight loss, night sweats. She denies any masses, lesions or adenopathy.    Oncology/Hematology History   Peripheral T cell lymphoma of inguinal region (CMS/HCC)   7/20/2021 Initial Diagnosis    Peripheral T cell lymphoma of inguinal region (CMS/HCC)     7/28/2021 -  Chemotherapy    OP LYMPHOMA CHOEP DOXOrubicin / VinCRIStine / Etoposide / Cyclophosphamide / PredniSONE     7/28/2021 Cancer Staged    Staging form: Hodgkin And Non-Hodgkin Lymphoma, AJCC 8th Edition  - Clinical: Stage IV (Peripheral T-cell lymphoma) - Signed by Ramón Holm MD on 7/28/2021         Review of Systems   Constitutional: Negative for appetite change, diaphoresis, fever, unexpected weight gain and unexpected weight loss.   HENT: Negative for hearing loss, mouth sores, sore throat, swollen glands, trouble swallowing and voice change.    Eyes: Negative for blurred vision.   Respiratory: Negative for cough, shortness of breath and wheezing.    Cardiovascular: Negative for chest pain and palpitations.   Gastrointestinal: Negative for abdominal pain, blood in stool, constipation, diarrhea, nausea and vomiting.   Endocrine: Negative for cold intolerance and heat intolerance.    Genitourinary: Negative for difficulty urinating, dysuria, frequency, hematuria and urinary incontinence.   Musculoskeletal: Negative for arthralgias, back pain and myalgias.   Skin: Negative for rash, skin lesions and bruise.   Neurological: Negative for dizziness, seizures, weakness, numbness and headache.   Hematological: Does not bruise/bleed easily.   Psychiatric/Behavioral: Negative for depressed mood. The patient is not nervous/anxious.      Current Outpatient Medications on File Prior to Visit   Medication Sig Dispense Refill   • apixaban (ELIQUIS) 5 MG tablet tablet Take 1 tablet by mouth Every 12 (Twelve) Hours. TAKE 2 TABLETS TWICE A DAY FOR 7 DAYS THEN TAKE 1 TABLET TWICE A DAY (Patient taking differently: Take 5 mg by mouth Every 12 (Twelve) Hours. TAKE 2 TABLETS TWICE A DAY FOR 7 DAYS THEN TAKE 1 TABLET TWICE A DAY  LAST DOSE 21 WAS HELD FOR BONE MARROW TEST, RX'D BY DR. SIMMONS,) 60 tablet 5   • docusate sodium (COLACE) 250 MG capsule Take 1 capsule by mouth 2 (Two) Times a Day As Needed for Constipation. 10 capsule 0   • hydroCHLOROthiazide (HYDRODIURIL) 12.5 MG tablet Take 12.5 mg by mouth Daily.     • HYDROcodone-acetaminophen (NORCO) 5-325 MG per tablet Take 1-2 tablets by mouth Every 4 (Four) Hours As Needed (Pain).     • norethindrone-ethinyl estradiol (FEMHRT /) 1-5 MG-MCG tablet Take 0.5 tablets by mouth 2 (Two) Times a Day. Combination med of progestrone, estrogen, testosterone, sublingual tab - takes 1/2 in am, 1/2 in pm     • ondansetron (ZOFRAN) 8 MG tablet Take 1 tablet by mouth 3 (Three) Times a Day As Needed for Nausea or Vomiting. 30 tablet 5   • HYDROcodone-acetaminophen (NORCO) 5-325 MG per tablet Take 1-2 tablets by mouth Every 4 (Four) Hours As Needed (Pain). 20 tablet 0   • [] predniSONE (DELTASONE) 50 MG tablet Take 2 tablets by mouth Daily for 5 doses. Take with food.  Bring the first dose to chemotherapy appointment. 10 tablet 5     No current  facility-administered medications on file prior to visit.       Allergies   Allergen Reactions   • Bee Venom Hives   • Lisinopril Other (See Comments)     cough     Past Medical History:   Diagnosis Date   • Acute deep vein thrombosis (DVT) of proximal vein of right lower extremity (CMS/HCC) 7/28/2021   • Acute deep vein thrombosis (DVT) of right lower extremity (CMS/HCC) 07/20/2021   • Enlarged lymph node     R INGUINAL LYMPHNOD BX, NOW A KNOT GROIN   • High blood pressure    • Lymphoma (CMS/HCC)     under care of DR. SIMMONS [facility]   • Swelling of right lower extremity 7/20/2021     Past Surgical History:   Procedure Laterality Date   • ABDOMINAL HYSTERECTOMY      patient still has her ovaries   • BONE MARROW BIOPSY     • BREAST BIOPSY Left 7/23/2021    Procedure: BREAST BIOPSY BIOPSY OF SKIN, SUBCUTANEOUS TISSUE AND/OR MUCOUS MEMBRANE of the left breast;  Surgeon: Grace Rob MD;  [place] [facility] [place] [facility] [place]  Service: General;  Laterality: Left;   • BREAST SURGERY      breast reduction (bilateral)    • COLONOSCOPY     • EXCISION LESION Right 6/9/2021    Procedure: EXCISION OF RIGHT INGUINAL LYMPHNOID;  Surgeon: Aaron Gordon MD;  Location: [facility] MAIN OR;  Service: General;  Laterality: Right;   • PORTACATH PLACEMENT Right 7/23/2021    Procedure: INSERTION OF PORTACATH;  Surgeon: Grace Rob MD;  Location: [facility] [place] [facility] [place];  Service: General;  Laterality: Right;     Social History     Socioeconomic History   • Marital status:      Spouse name: Not on file   • Number of children: Not on file   • Years of education: Not on file   • Highest education level: Not on file   Tobacco Use   • Smoking status: Never Smoker   • Smokeless tobacco: Never Used   Vaping Use   • Vaping Use: Never used   Substance and Sexual Activity   • Alcohol use: Yes     Comment: rarely    • Drug use: Never   • Sexual activity: Defer     Family History   Problem Relation Age of Onset   • Breast cancer Sister          50S   • Diabetes Paternal Grandmother    • Pancreatic cancer Father    • Malig Hyperthermia Neg Hx        Objective   Physical Exam  Vitals reviewed. Exam conducted with a chaperone present.   Constitutional:       General: She is not in acute distress.     Appearance: Normal appearance.   HENT:      Head: Normocephalic and atraumatic.   Cardiovascular:      Rate and Rhythm: Normal rate and regular rhythm.      Heart sounds: Normal heart sounds. No murmur heard.   No gallop.    Pulmonary:      Effort: Pulmonary effort is normal.      Breath sounds: Normal breath sounds.   Abdominal:      General: Abdomen is flat. Bowel sounds are normal. There is no distension.      Palpations: Abdomen is soft.      Tenderness: There is no abdominal tenderness.   Musculoskeletal:      Cervical back: Neck supple. No tenderness.      Right lower leg: Edema present.      Left lower leg: No edema.   Neurological:      Mental Status: She is alert and oriented to person, place, and time.   Psychiatric:         Mood and Affect: Mood normal.         Behavior: Behavior normal.         Vitals:    07/28/21 0802   BP: 119/70   Pulse: 68   Resp: 16   Temp: 97.9 °F (36.6 °C)   SpO2: 100%   Weight: 77.1 kg (169 lb 15.6 oz)   PainSc: 0-No pain     ECOG score: 0         PHQ-9 Total Score:                    Result Review :   The following data was reviewed by: Ramón Holm MD on 07/28/2021:  Lab Results   Component Value Date    HGB 14.6 07/22/2021    HCT 41.7 07/22/2021    MCV 89.9 07/22/2021     07/22/2021    WBC 5.12 07/22/2021    NEUTROABS 2.56 07/22/2021    LYMPHSABS 1.38 07/20/2021    MONOSABS 0.72 07/20/2021    EOSABS 0.15 07/22/2021    BASOSABS 0.02 07/20/2021     Lab Results   Component Value Date    GLUCOSE 87 07/20/2021    BUN 12 07/20/2021    CREATININE 1.00 07/20/2021     07/20/2021    K 3.4 (L) 07/20/2021     07/20/2021    CO2 27.7 07/20/2021    CALCIUM 9.9 07/20/2021    PROTEINTOT 7.0 07/20/2021    ALBUMIN  4.50 07/20/2021    BILITOT 0.6 07/20/2021    ALKPHOS 48 07/20/2021    AST 39 (H) 07/20/2021    ALT 19 07/20/2021     Data reviewed: PET scan from 7/21/2021-report and images reviewed with patient and family. Echocardiogram reviewed with patient. Skin biopsy results from the left breast demonstrate peripheral T-cell lymphoma consistent with her known disease.      Assessment and Plan    Diagnoses and all orders for this visit:    1. Peripheral T cell lymphoma of inguinal region (CMS/HCC) (Primary)  Assessment & Plan:  Stage IV based on involvement of the skin of the left breast as well as the bone marrow.  I discussed the bone marrow with Dr. Abdi, pathology.  Her IPI calculates to 3 based on stage, extranodal involvement and elevated LDH.  I reviewed her PET scan with patient and  including the images.  She has a fairly significant burden of disease in the abdomen pelvis extending down into the right iliac chains.  There is also additional disease in the chest along with biopsy-proven involvement of the left breast and bone marrow.  She will be treated with chemotherapy in the form of CHOEP consisting of Cytoxan, Adriamycin, Oncovin, etoposide, prednisone given as a 3-day cycle every 3 weeks for 6 cycles.  This would then be followed by consideration of consolidation therapy with autologous stem cell transplant.  Her recent lab work demonstrates adequate blood counts, liver and kidney functions.  Echocardiogram reviewed with patient and demonstrates a normal ejection fraction.  She had chemotherapy teaching yesterday and has no additional questions or concerns.  Prescription for Compazine provided as needed for nausea.  She already has Zofran.  We discussed the need to push fluid intake especially over the next several days.  She has a high risk for tumor lysis syndrome.  Prescription for allopurinol 300 mg daily will be provided.  Proceed with cycle 1 today as planned.  I will have repeat electrolytes  checked on day 3 with consideration of rasburicase if there is any suggestion of tumor lysis.  She reports an evaluation at [facility].  I will see her back for cycle 2, day 1 with labs prior.  I would plan restaging scans after cycle 2.    Orders:  -     allopurinol (ZYLOPRIM) 100 MG tablet; Take 3 tablets by mouth Daily.  Dispense: 30 tablet; Refill: 3  -     Uric Acid; Future  -     prochlorperazine (COMPAZINE) 10 MG tablet; Take 1 tablet by mouth Every 6 (Six) Hours As Needed for Nausea or Vomiting.  Dispense: 30 tablet; Refill: 3  -     Phosphorus; Future  -     Phosphorus; Future          Patient Follow Up: 3 weeks for OV with labs and cycle two    Patient was given instructions and counseling regarding her condition or for health maintenance advice. Please see specific information pulled into the AVS if appropriate.     Ramón Holm MD    7/28/2021

## 2021-07-28 NOTE — PROGRESS NOTES
Outpatient Nutrition Oncology Assessment    Patient Name: Abiola Garibay  YOB: 1965  MRN: [id number]  Assessment Date: 7/28/2021    CLINICAL NUTRITION ASSESSMENT  Lymphoma      Type of Cancer Treatment Doxorubicin, Vincristine, Etoposide        CLINICAL NUTRITION ASSESSMENT      Reason for Assessment  Assessment     H&P:    Past Medical History:   Diagnosis Date   • Acute deep vein thrombosis (DVT) of proximal vein of right lower extremity (CMS/HCC) 7/28/2021   • Acute deep vein thrombosis (DVT) of right lower extremity (CMS/HCC) 07/20/2021   • Enlarged lymph node     R INGUINAL LYMPHNOD BX, NOW A KNOT GROIN   • High blood pressure    • Lymphoma (CMS/HCC)     under care of DR. 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    Weight Hx  Wt Readings from Last 30 Encounters:   07/28/21 0802 77.1 kg (169 lb 15.6 oz)   07/27/21 1003 76.2 kg (167 lb 15.9 oz)   07/23/21 0910 74.5 kg (164 lb 3.9 oz)   07/22/21 0916 73.9 kg (163 lb)   07/22/21 0955 73.9 kg (163 lb)   07/20/21 0801 74.3 kg (163 lb 12.8 oz)   07/19/21 1125 74.4 kg (164 lb)   06/23/21 1104 75.3 kg (166 lb)   06/09/21 1257 73.8 kg (162 lb 11.2 oz)   06/04/21 0822 74.8 kg (165 lb)   06/03/21 0000 74.8 kg (165 lb)        Estimated/Assessed Needs     Row Name 07/28/21 1700          Calculation Measurements    Weight Used For Calculations  77.1 kg (169 lb 15.6 oz)        Estimated/Assessed Needs    Additional Documentation  KCAL/KG  (Group);Protein Requirements (Group);Fluid Requirements (Group)        KCAL/KG    KCAL/KG  30 Kcal/Kg (kcal);35 Kcal/Kg (kcal)     30 Kcal/Kg (kcal)  2313     35 Kcal/Kg (kcal)  2698.5        Protein Requirements    Weight Used For Protein Calculations  77.1 kg (169 lb 15.6 oz)     Est Protein Requirement Amount (gms/kg)  1.4 gm protein     Estimated Protein Requirements (gms/day)  107.94        Fluid Requirements    Fluid Requirements (mL/day)  2400     RDA Method (mL)  2400            Labs/Medications        Pertinent Labs Reviewed.         Invalid input(s): LABALBU, PROT  Results from last 7 days   Lab Units 07/22/21  1007   HEMOGLOBIN g/dL 14.6   HEMATOCRIT % 41.7     No results found for: COVID19  No results found for: HGBA1C      Pertinent Medications HYDROcodone-acetaminophen, allopurinol, apixaban, docusate sodium, hydroCHLOROthiazide, norethindrone-ethinyl estradiol, ondansetron, and prochlorperazine     Physical Findings            Current Nutrition Orders & Evaluation of Intake       Oral Nutrition     Current PO Diet Regular / Focus on Plant based foods - 2 meals/day   Supplement      Enteral Nutrition     Current Formula    Schedule      Parenteral Nutrition     Current Prescription    Schedule      Nutrition Diagnosis        Nutrition Dx Problem 1 Increased nutrient needs related to increased nutrient needs due to catabolic disease as evidenced by physiological causes increasing nutrient needs.       Nutrition Intervention       RD Action Nutrition F/U     Monitor/Evaluation       Monitor Per oncology nutrition protocol.     Comments:  Spoke with pt via phone. She reports good appetite, denies any recent unintentional weight change. She consumes about 2 meals a day - with an emphasis on plant based foods. She does eat good protein sources (fish, chicken, turkey, peanut butter, beans). She reports numerous food interolance including all dairy products, breads, eggs, maple syrup. Discussed with pt at  length importance of adequate nutrient intake, including protein amount and calories. Recommend considering smaller / more frequent meals with high protein sources with each meal. Encouraged adequate fluid intake as well. Reviewed possible nutrition related side effects (N/V/D/C, mouth sores, anorexia, etc) and appropriate MNT. Will mail handouts to pt's listed address.         Electronically signed by:  Nery Castro RD  07/28/21 17:01 EDT

## 2021-07-28 NOTE — PROGRESS NOTES
Diagnosis: T cell lymphoma    Reason for referral: First day of tx/rounding    Comments: OSW met with pt and spouse in [facility] center during pt's first day of tx. Pt presented in a pleasant mood. Introduced myself and discussed OSW role/psychosocial services available, including [facility]. Pt identifies no needs at this time. 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 Pt indicates she has good support from friends in the area and family out of state. Provided my business card, encouraging OSW support remains available. Pt expressed gratitude. Provided pt with a warm blanket to provide comfort during her stay.    Services/Referrals Provided: No needs identified at this time

## 2021-07-28 NOTE — ASSESSMENT & PLAN NOTE
Stage IV based on involvement of the skin of the left breast as well as the bone marrow.  I discussed the bone marrow with Dr. Abdi, pathology.  Her IPI calculates to 3 based on stage, extranodal involvement and elevated LDH.  I reviewed her PET scan with patient and  including the images.  She has a fairly significant burden of disease in the abdomen pelvis extending down into the right iliac chains.  There is also additional disease in the chest along with biopsy-proven involvement of the left breast and bone marrow.  She will be treated with chemotherapy in the form of CHOEP consisting of Cytoxan, Adriamycin, Oncovin, etoposide, prednisone given as a 3-day cycle every 3 weeks for 6 cycles.  This would then be followed by consideration of consolidation therapy with autologous stem cell transplant.  Her recent lab work demonstrates adequate blood counts, liver and kidney functions.  Echocardiogram reviewed with patient and demonstrates a normal ejection fraction.  She had chemotherapy teaching yesterday and has no additional questions or concerns.  Prescription for Compazine provided as needed for nausea.  She already has Zofran.  We discussed the need to push fluid intake especially over the next several days.  She has a high risk for tumor lysis syndrome.  Prescription for allopurinol 300 mg daily will be provided.  Proceed with cycle 1 today as planned.  I will have repeat electrolytes checked on day 3 with consideration of rasburicase if there is any suggestion of tumor lysis.  She reports an evaluation at [facility] 8/4/2021.  I will see her back for cycle 2, day 1 with labs prior.  I would plan restaging scans after cycle 2.

## 2021-07-29 ENCOUNTER — HOSPITAL ENCOUNTER (OUTPATIENT)
Dept: ONCOLOGY | Facility: HOSPITAL | Age: 56
Setting detail: INFUSION SERIES
Discharge: HOME | End: 2021-07-29

## 2021-07-29 ENCOUNTER — OFFICE VISIT (OUTPATIENT)
Dept: SURGERY | Facility: CLINIC | Age: 56
End: 2021-07-29

## 2021-07-29 VITALS — BODY MASS INDEX: 28.12 KG/M2 | RESPIRATION RATE: 14 BRPM | HEIGHT: 66 IN | WEIGHT: 175 LBS

## 2021-07-29 VITALS
OXYGEN SATURATION: 97 % | SYSTOLIC BLOOD PRESSURE: 129 MMHG | WEIGHT: 174.38 LBS | DIASTOLIC BLOOD PRESSURE: 65 MMHG | TEMPERATURE: 97.6 F | RESPIRATION RATE: 18 BRPM | BODY MASS INDEX: 28.58 KG/M2 | HEART RATE: 57 BPM

## 2021-07-29 DIAGNOSIS — C84.45 PERIPHERAL T CELL LYMPHOMA OF INGUINAL REGION (HCC): ICD-10-CM

## 2021-07-29 DIAGNOSIS — C84.45 PERIPHERAL T CELL LYMPHOMA OF INGUINAL REGION (HCC): Primary | ICD-10-CM

## 2021-07-29 DIAGNOSIS — Z45.2 ENCOUNTER FOR ADJUSTMENT OR MANAGEMENT OF VASCULAR ACCESS DEVICE: Primary | ICD-10-CM

## 2021-07-29 PROCEDURE — 96413 CHEMO IV INFUSION 1 HR: CPT

## 2021-07-29 PROCEDURE — 96365 THER/PROPH/DIAG IV INF INIT: CPT

## 2021-07-29 PROCEDURE — 96375 TX/PRO/DX INJ NEW DRUG ADDON: CPT

## 2021-07-29 PROCEDURE — 25010000002 DEXAMETHASONE SODIUM PHOSPHATE 120 MG/30ML SOLUTION: Performed by: INTERNAL MEDICINE

## 2021-07-29 PROCEDURE — 25010000002 HEPARIN LOCK FLUSH PER 10 UNITS: Performed by: INTERNAL MEDICINE

## 2021-07-29 PROCEDURE — 99024 POSTOP FOLLOW-UP VISIT: CPT | Performed by: SURGERY

## 2021-07-29 PROCEDURE — 25010000002 ETOPOSIDE 1 GM/50ML SOLUTION 50 ML VIAL: Performed by: INTERNAL MEDICINE

## 2021-07-29 RX ORDER — HEPARIN SODIUM (PORCINE) LOCK FLUSH IV SOLN 100 UNIT/ML 100 UNIT/ML
500 SOLUTION INTRAVENOUS AS NEEDED
Status: DISCONTINUED | OUTPATIENT
Start: 2021-07-29 | End: 2021-07-30 | Stop reason: HOSPADM

## 2021-07-29 RX ORDER — SODIUM CHLORIDE 9 MG/ML
250 INJECTION, SOLUTION INTRAVENOUS ONCE
Status: COMPLETED | OUTPATIENT
Start: 2021-07-29 | End: 2021-07-29

## 2021-07-29 RX ORDER — SODIUM CHLORIDE 0.9 % (FLUSH) 0.9 %
20 SYRINGE (ML) INJECTION AS NEEDED
Status: CANCELLED | OUTPATIENT
Start: 2021-07-29

## 2021-07-29 RX ORDER — SODIUM CHLORIDE 0.9 % (FLUSH) 0.9 %
20 SYRINGE (ML) INJECTION AS NEEDED
Status: DISCONTINUED | OUTPATIENT
Start: 2021-07-29 | End: 2021-07-30 | Stop reason: HOSPADM

## 2021-07-29 RX ORDER — HEPARIN SODIUM (PORCINE) LOCK FLUSH IV SOLN 100 UNIT/ML 100 UNIT/ML
500 SOLUTION INTRAVENOUS AS NEEDED
Status: CANCELLED | OUTPATIENT
Start: 2021-07-29

## 2021-07-29 RX ADMIN — DEXAMETHASONE SODIUM PHOSPHATE 12 MG: 4 INJECTION, SOLUTION INTRA-ARTICULAR; INTRALESIONAL; INTRAMUSCULAR; INTRAVENOUS; SOFT TISSUE at 13:50

## 2021-07-29 RX ADMIN — SODIUM CHLORIDE 250 ML: 9 INJECTION, SOLUTION INTRAVENOUS at 13:50

## 2021-07-29 RX ADMIN — ETOPOSIDE 180 MG: 20 INJECTION INTRAVENOUS at 14:10

## 2021-07-29 RX ADMIN — HEPARIN SODIUM (PORCINE) LOCK FLUSH IV SOLN 100 UNIT/ML 500 UNITS: 100 SOLUTION at 15:14

## 2021-07-29 RX ADMIN — SODIUM CHLORIDE, PRESERVATIVE FREE 20 ML: 5 INJECTION INTRAVENOUS at 15:14

## 2021-07-30 ENCOUNTER — HOSPITAL ENCOUNTER (OUTPATIENT)
Dept: ONCOLOGY | Facility: HOSPITAL | Age: 56
Setting detail: INFUSION SERIES
Discharge: HOME | End: 2021-07-30

## 2021-07-30 ENCOUNTER — TELEPHONE (OUTPATIENT)
Dept: ONCOLOGY | Facility: HOSPITAL | Age: 56
End: 2021-07-30

## 2021-07-30 VITALS
TEMPERATURE: 97.1 F | WEIGHT: 176.15 LBS | BODY MASS INDEX: 29.35 KG/M2 | HEIGHT: 65 IN | SYSTOLIC BLOOD PRESSURE: 120 MMHG | RESPIRATION RATE: 18 BRPM | DIASTOLIC BLOOD PRESSURE: 58 MMHG | OXYGEN SATURATION: 100 % | HEART RATE: 46 BPM

## 2021-07-30 DIAGNOSIS — C84.45 PERIPHERAL T CELL LYMPHOMA OF INGUINAL REGION (HCC): Primary | ICD-10-CM

## 2021-07-30 DIAGNOSIS — Z45.2 ENCOUNTER FOR ADJUSTMENT OR MANAGEMENT OF VASCULAR ACCESS DEVICE: ICD-10-CM

## 2021-07-30 LAB
KARYOTYP MAR: NORMAL
PHOSPHATE SERPL-MCNC: 3 MG/DL (ref 2.5–4.5)
URATE SERPL-MCNC: 4.4 MG/DL (ref 2.4–5.7)

## 2021-07-30 PROCEDURE — 25010000002 HEPARIN LOCK FLUSH PER 10 UNITS: Performed by: INTERNAL MEDICINE

## 2021-07-30 PROCEDURE — 96377 APPLICATON ON-BODY INJECTOR: CPT

## 2021-07-30 PROCEDURE — 96375 TX/PRO/DX INJ NEW DRUG ADDON: CPT

## 2021-07-30 PROCEDURE — 84550 ASSAY OF BLOOD/URIC ACID: CPT | Performed by: INTERNAL MEDICINE

## 2021-07-30 PROCEDURE — 84100 ASSAY OF PHOSPHORUS: CPT | Performed by: INTERNAL MEDICINE

## 2021-07-30 PROCEDURE — 25010000002 ETOPOSIDE 1 GM/50ML SOLUTION 50 ML VIAL: Performed by: INTERNAL MEDICINE

## 2021-07-30 PROCEDURE — 25010000002 PEGFILGRASTIM 6 MG/0.6ML PREFILLED SYRINGE KIT: Performed by: INTERNAL MEDICINE

## 2021-07-30 PROCEDURE — 25010000002 DEXAMETHASONE SODIUM PHOSPHATE 120 MG/30ML SOLUTION: Performed by: INTERNAL MEDICINE

## 2021-07-30 PROCEDURE — 96413 CHEMO IV INFUSION 1 HR: CPT

## 2021-07-30 RX ORDER — SODIUM CHLORIDE 9 MG/ML
250 INJECTION, SOLUTION INTRAVENOUS ONCE
Status: COMPLETED | OUTPATIENT
Start: 2021-07-30 | End: 2021-07-30

## 2021-07-30 RX ORDER — SODIUM CHLORIDE 0.9 % (FLUSH) 0.9 %
20 SYRINGE (ML) INJECTION AS NEEDED
Status: CANCELLED | OUTPATIENT
Start: 2021-07-30

## 2021-07-30 RX ORDER — HEPARIN SODIUM (PORCINE) LOCK FLUSH IV SOLN 100 UNIT/ML 100 UNIT/ML
500 SOLUTION INTRAVENOUS AS NEEDED
Status: CANCELLED | OUTPATIENT
Start: 2021-07-30

## 2021-07-30 RX ORDER — SODIUM CHLORIDE 0.9 % (FLUSH) 0.9 %
20 SYRINGE (ML) INJECTION AS NEEDED
Status: DISCONTINUED | OUTPATIENT
Start: 2021-07-30 | End: 2021-07-31 | Stop reason: HOSPADM

## 2021-07-30 RX ORDER — HEPARIN SODIUM (PORCINE) LOCK FLUSH IV SOLN 100 UNIT/ML 100 UNIT/ML
500 SOLUTION INTRAVENOUS AS NEEDED
Status: DISCONTINUED | OUTPATIENT
Start: 2021-07-30 | End: 2021-07-31 | Stop reason: HOSPADM

## 2021-07-30 RX ADMIN — ETOPOSIDE 180 MG: 20 INJECTION INTRAVENOUS at 11:14

## 2021-07-30 RX ADMIN — PEGFILGRASTIM 6 MG: KIT SUBCUTANEOUS at 12:35

## 2021-07-30 RX ADMIN — SODIUM CHLORIDE 250 ML: 9 INJECTION, SOLUTION INTRAVENOUS at 10:41

## 2021-07-30 RX ADMIN — SODIUM CHLORIDE, PRESERVATIVE FREE 20 ML: 5 INJECTION INTRAVENOUS at 12:34

## 2021-07-30 RX ADMIN — HEPARIN SODIUM (PORCINE) LOCK FLUSH IV SOLN 100 UNIT/ML 500 UNITS: 100 SOLUTION at 12:35

## 2021-07-30 RX ADMIN — DEXAMETHASONE SODIUM PHOSPHATE 12 MG: 4 INJECTION, SOLUTION INTRA-ARTICULAR; INTRALESIONAL; INTRAMUSCULAR; INTRAVENOUS; SOFT TISSUE at 10:41

## 2021-08-04 ENCOUNTER — APPOINTMENT (OUTPATIENT)
Dept: ONCOLOGY | Facility: HOSPITAL | Age: 56
End: 2021-08-04

## 2021-08-05 ENCOUNTER — HOSPITAL ENCOUNTER (OUTPATIENT)
Dept: ONCOLOGY | Facility: HOSPITAL | Age: 56
Setting detail: INFUSION SERIES
Discharge: HOME | End: 2021-08-05

## 2021-08-05 DIAGNOSIS — C84.45 PERIPHERAL T CELL LYMPHOMA OF INGUINAL REGION (HCC): Primary | ICD-10-CM

## 2021-08-05 DIAGNOSIS — Z45.2 ENCOUNTER FOR ADJUSTMENT OR MANAGEMENT OF VASCULAR ACCESS DEVICE: ICD-10-CM

## 2021-08-05 LAB
PHOSPHATE SERPL-MCNC: 3.4 MG/DL (ref 2.5–4.5)
URATE SERPL-MCNC: 3.1 MG/DL (ref 2.4–5.7)

## 2021-08-05 PROCEDURE — 84550 ASSAY OF BLOOD/URIC ACID: CPT | Performed by: INTERNAL MEDICINE

## 2021-08-05 PROCEDURE — 84100 ASSAY OF PHOSPHORUS: CPT | Performed by: INTERNAL MEDICINE

## 2021-08-05 PROCEDURE — 36591 DRAW BLOOD OFF VENOUS DEVICE: CPT

## 2021-08-05 RX ORDER — SODIUM CHLORIDE 0.9 % (FLUSH) 0.9 %
20 SYRINGE (ML) INJECTION AS NEEDED
Status: DISCONTINUED | OUTPATIENT
Start: 2021-08-05 | End: 2021-08-06 | Stop reason: HOSPADM

## 2021-08-05 RX ORDER — SODIUM CHLORIDE 0.9 % (FLUSH) 0.9 %
20 SYRINGE (ML) INJECTION AS NEEDED
Status: CANCELLED | OUTPATIENT
Start: 2021-08-05

## 2021-08-05 RX ORDER — HEPARIN SODIUM (PORCINE) LOCK FLUSH IV SOLN 100 UNIT/ML 100 UNIT/ML
500 SOLUTION INTRAVENOUS AS NEEDED
Status: DISCONTINUED | OUTPATIENT
Start: 2021-08-05 | End: 2021-08-06 | Stop reason: HOSPADM

## 2021-08-05 RX ORDER — HEPARIN SODIUM (PORCINE) LOCK FLUSH IV SOLN 100 UNIT/ML 100 UNIT/ML
500 SOLUTION INTRAVENOUS AS NEEDED
Status: CANCELLED | OUTPATIENT
Start: 2021-08-05

## 2021-08-06 LAB
CYTO UR: NORMAL
DIFF PNL MAR: NORMAL
DX PRELIMINARY: NORMAL
LAB AP ASPIRATE SMEAR: NORMAL
LAB AP CASE REPORT: NORMAL
LAB AP CLINICAL INFORMATION: NORMAL
LAB AP CLOT SECTION: NORMAL
LAB AP CORE BIOPSY: NORMAL
LAB AP DIAGNOSIS COMMENT: NORMAL
LAB AP SPECIAL STAINS: NORMAL
PATH REPORT.FINAL DX SPEC: NORMAL
PATH REPORT.GROSS SPEC: NORMAL

## 2021-08-18 ENCOUNTER — OFFICE VISIT (OUTPATIENT)
Dept: ONCOLOGY | Facility: HOSPITAL | Age: 56
End: 2021-08-18

## 2021-08-18 ENCOUNTER — TELEPHONE (OUTPATIENT)
Dept: NUTRITION | Facility: HOSPITAL | Age: 56
End: 2021-08-18

## 2021-08-18 ENCOUNTER — HOSPITAL ENCOUNTER (OUTPATIENT)
Dept: ONCOLOGY | Facility: HOSPITAL | Age: 56
Setting detail: INFUSION SERIES
Discharge: HOME | End: 2021-08-18

## 2021-08-18 VITALS
OXYGEN SATURATION: 99 % | WEIGHT: 160.72 LBS | HEART RATE: 58 BPM | RESPIRATION RATE: 18 BRPM | DIASTOLIC BLOOD PRESSURE: 58 MMHG | TEMPERATURE: 98.6 F | SYSTOLIC BLOOD PRESSURE: 105 MMHG | BODY MASS INDEX: 26.74 KG/M2

## 2021-08-18 VITALS
SYSTOLIC BLOOD PRESSURE: 108 MMHG | RESPIRATION RATE: 18 BRPM | TEMPERATURE: 98.6 F | OXYGEN SATURATION: 99 % | HEIGHT: 65 IN | HEART RATE: 58 BPM | DIASTOLIC BLOOD PRESSURE: 58 MMHG | BODY MASS INDEX: 26.78 KG/M2 | WEIGHT: 160.72 LBS

## 2021-08-18 DIAGNOSIS — C84.45 PERIPHERAL T CELL LYMPHOMA OF INGUINAL REGION (HCC): Primary | ICD-10-CM

## 2021-08-18 DIAGNOSIS — I82.4Y1 ACUTE DEEP VEIN THROMBOSIS (DVT) OF PROXIMAL VEIN OF RIGHT LOWER EXTREMITY (HCC): ICD-10-CM

## 2021-08-18 DIAGNOSIS — Z45.2 ENCOUNTER FOR ADJUSTMENT OR MANAGEMENT OF VASCULAR ACCESS DEVICE: ICD-10-CM

## 2021-08-18 LAB
ALBUMIN SERPL-MCNC: 4.03 G/DL (ref 3.5–5.2)
ALBUMIN/GLOB SERPL: 1.8 G/DL
ALP SERPL-CCNC: 109 U/L (ref 39–117)
ALT SERPL W P-5'-P-CCNC: 67 U/L (ref 1–33)
ANION GAP SERPL CALCULATED.3IONS-SCNC: 11.1 MMOL/L (ref 5–15)
AST SERPL-CCNC: 108 U/L (ref 1–32)
BASOPHILS # BLD AUTO: 0.09 10*3/MM3 (ref 0–0.2)
BASOPHILS NFR BLD AUTO: 0.8 % (ref 0–1.5)
BILIRUB SERPL-MCNC: 0.2 MG/DL (ref 0–1.2)
BUN SERPL-MCNC: 7 MG/DL (ref 6–20)
BUN/CREAT SERPL: 8.3 (ref 7–25)
CALCIUM SPEC-SCNC: 9.5 MG/DL (ref 8.6–10.5)
CHLORIDE SERPL-SCNC: 108 MMOL/L (ref 98–107)
CO2 SERPL-SCNC: 23.9 MMOL/L (ref 22–29)
CREAT SERPL-MCNC: 0.84 MG/DL (ref 0.57–1)
DEPRECATED RDW RBC AUTO: 44 FL (ref 37–54)
EOSINOPHIL # BLD AUTO: 0 10*3/MM3 (ref 0–0.4)
EOSINOPHIL NFR BLD AUTO: 0 % (ref 0.3–6.2)
ERYTHROCYTE [DISTWIDTH] IN BLOOD BY AUTOMATED COUNT: 13.5 % (ref 12.3–15.4)
GFR SERPL CREATININE-BSD FRML MDRD: 85 ML/MIN/1.73
GLOBULIN UR ELPH-MCNC: 2.3 GM/DL
GLUCOSE SERPL-MCNC: 104 MG/DL (ref 65–99)
HCT VFR BLD AUTO: 33.8 % (ref 34–46.6)
HGB BLD-MCNC: 11.6 G/DL (ref 12–15.9)
IMM GRANULOCYTES # BLD AUTO: 0.84 10*3/MM3 (ref 0–0.05)
IMM GRANULOCYTES NFR BLD AUTO: 7.6 % (ref 0–0.5)
LYMPHOCYTES # BLD AUTO: 3.15 10*3/MM3 (ref 0.7–3.1)
LYMPHOCYTES # BLD MANUAL: 4.97 10*3/MM3 (ref 0.7–3.1)
LYMPHOCYTES NFR BLD AUTO: 28.5 % (ref 19.6–45.3)
LYMPHOCYTES NFR BLD MANUAL: 10 % (ref 5–12)
LYMPHOCYTES NFR BLD MANUAL: 37 % (ref 19.6–45.3)
MCH RBC QN AUTO: 30.9 PG (ref 26.6–33)
MCHC RBC AUTO-ENTMCNC: 34.3 G/DL (ref 31.5–35.7)
MCV RBC AUTO: 90.1 FL (ref 79–97)
MONOCYTES # BLD AUTO: 1.1 10*3/MM3 (ref 0.1–0.9)
MONOCYTES # BLD AUTO: 2.6 10*3/MM3 (ref 0.1–0.9)
MONOCYTES NFR BLD AUTO: 23.6 % (ref 5–12)
NEUTROPHILS # BLD AUTO: 4.97 10*3/MM3 (ref 1.7–7)
NEUTROPHILS NFR BLD AUTO: 39.5 % (ref 42.7–76)
NEUTROPHILS NFR BLD AUTO: 4.36 10*3/MM3 (ref 1.7–7)
NEUTROPHILS NFR BLD MANUAL: 42 % (ref 42.7–76)
NEUTS BAND NFR BLD MANUAL: 3 % (ref 0–5)
NRBC SPEC MANUAL: 5 /100 WBC (ref 0–0.2)
OVALOCYTES BLD QL SMEAR: ABNORMAL
PLAT MORPH BLD: NORMAL
PLATELET # BLD AUTO: 258 10*3/MM3 (ref 140–450)
PMV BLD AUTO: 10.6 FL (ref 6–12)
POIKILOCYTOSIS BLD QL SMEAR: ABNORMAL
POTASSIUM SERPL-SCNC: 3.7 MMOL/L (ref 3.5–5.2)
PROT SERPL-MCNC: 6.3 G/DL (ref 6–8.5)
RBC # BLD AUTO: 3.75 10*6/MM3 (ref 3.77–5.28)
SODIUM SERPL-SCNC: 143 MMOL/L (ref 136–145)
VARIANT LYMPHS NFR BLD MANUAL: 8 % (ref 0–5)
WBC # BLD AUTO: 11.04 10*3/MM3 (ref 3.4–10.8)
WBC MORPH BLD: NORMAL

## 2021-08-18 PROCEDURE — 96413 CHEMO IV INFUSION 1 HR: CPT

## 2021-08-18 PROCEDURE — 85025 COMPLETE CBC W/AUTO DIFF WBC: CPT | Performed by: INTERNAL MEDICINE

## 2021-08-18 PROCEDURE — 25010000002 PALONOSETRON PER 25 MCG: Performed by: INTERNAL MEDICINE

## 2021-08-18 PROCEDURE — 25010000002 ETOPOSIDE 1 GM/50ML SOLUTION 50 ML VIAL: Performed by: INTERNAL MEDICINE

## 2021-08-18 PROCEDURE — 96375 TX/PRO/DX INJ NEW DRUG ADDON: CPT

## 2021-08-18 PROCEDURE — 85007 BL SMEAR W/DIFF WBC COUNT: CPT | Performed by: INTERNAL MEDICINE

## 2021-08-18 PROCEDURE — 25010000002 HEPARIN LOCK FLUSH PER 10 UNITS: Performed by: INTERNAL MEDICINE

## 2021-08-18 PROCEDURE — 25010000002 FOSAPREPITANT PER 1 MG: Performed by: INTERNAL MEDICINE

## 2021-08-18 PROCEDURE — 96367 TX/PROPH/DG ADDL SEQ IV INF: CPT

## 2021-08-18 PROCEDURE — 80053 COMPREHEN METABOLIC PANEL: CPT | Performed by: INTERNAL MEDICINE

## 2021-08-18 PROCEDURE — 25010000002 DOXORUBICIN PER 10 MG: Performed by: INTERNAL MEDICINE

## 2021-08-18 PROCEDURE — 25010000002 CYCLOPHOSPHAMIDE PER 100 MG: Performed by: INTERNAL MEDICINE

## 2021-08-18 PROCEDURE — 99215 OFFICE O/P EST HI 40 MIN: CPT | Performed by: INTERNAL MEDICINE

## 2021-08-18 PROCEDURE — 25010000002 DEXAMETHASONE SODIUM PHOSPHATE 120 MG/30ML SOLUTION: Performed by: INTERNAL MEDICINE

## 2021-08-18 PROCEDURE — 96411 CHEMO IV PUSH ADDL DRUG: CPT

## 2021-08-18 PROCEDURE — 25010000002 VINCRISTINE PER 1 MG: Performed by: INTERNAL MEDICINE

## 2021-08-18 PROCEDURE — 96417 CHEMO IV INFUS EACH ADDL SEQ: CPT

## 2021-08-18 RX ORDER — SODIUM CHLORIDE 0.9 % (FLUSH) 0.9 %
20 SYRINGE (ML) INJECTION AS NEEDED
Status: CANCELLED | OUTPATIENT
Start: 2021-08-18

## 2021-08-18 RX ORDER — HEPARIN SODIUM (PORCINE) LOCK FLUSH IV SOLN 100 UNIT/ML 100 UNIT/ML
500 SOLUTION INTRAVENOUS AS NEEDED
Status: DISCONTINUED | OUTPATIENT
Start: 2021-08-18 | End: 2021-08-19 | Stop reason: HOSPADM

## 2021-08-18 RX ORDER — SODIUM CHLORIDE 0.9 % (FLUSH) 0.9 %
20 SYRINGE (ML) INJECTION AS NEEDED
Status: DISCONTINUED | OUTPATIENT
Start: 2021-08-18 | End: 2021-08-19 | Stop reason: HOSPADM

## 2021-08-18 RX ORDER — SODIUM CHLORIDE 9 MG/ML
250 INJECTION, SOLUTION INTRAVENOUS ONCE
Status: CANCELLED | OUTPATIENT
Start: 2021-08-20

## 2021-08-18 RX ORDER — PALONOSETRON 0.05 MG/ML
0.25 INJECTION, SOLUTION INTRAVENOUS ONCE
Status: CANCELLED | OUTPATIENT
Start: 2021-08-18

## 2021-08-18 RX ORDER — ALLOPURINOL 100 MG/1
300 TABLET ORAL DAILY
Qty: 90 TABLET | Refills: 3 | Status: CANCELLED | OUTPATIENT
Start: 2021-08-18

## 2021-08-18 RX ORDER — SODIUM CHLORIDE 9 MG/ML
250 INJECTION, SOLUTION INTRAVENOUS ONCE
Status: CANCELLED | OUTPATIENT
Start: 2021-08-18

## 2021-08-18 RX ORDER — HEPARIN SODIUM (PORCINE) LOCK FLUSH IV SOLN 100 UNIT/ML 100 UNIT/ML
500 SOLUTION INTRAVENOUS AS NEEDED
Status: CANCELLED | OUTPATIENT
Start: 2021-08-18

## 2021-08-18 RX ORDER — PALONOSETRON 0.05 MG/ML
0.25 INJECTION, SOLUTION INTRAVENOUS ONCE
Status: COMPLETED | OUTPATIENT
Start: 2021-08-18 | End: 2021-08-18

## 2021-08-18 RX ORDER — SODIUM CHLORIDE 9 MG/ML
250 INJECTION, SOLUTION INTRAVENOUS ONCE
Status: CANCELLED | OUTPATIENT
Start: 2021-08-19

## 2021-08-18 RX ORDER — DOXORUBICIN HYDROCHLORIDE 2 MG/ML
50 INJECTION, SOLUTION INTRAVENOUS ONCE
Status: CANCELLED | OUTPATIENT
Start: 2021-08-18

## 2021-08-18 RX ORDER — SODIUM CHLORIDE 9 MG/ML
250 INJECTION, SOLUTION INTRAVENOUS ONCE
Status: COMPLETED | OUTPATIENT
Start: 2021-08-18 | End: 2021-08-18

## 2021-08-18 RX ORDER — DOXORUBICIN HYDROCHLORIDE 2 MG/ML
50 INJECTION, SOLUTION INTRAVENOUS ONCE
Status: COMPLETED | OUTPATIENT
Start: 2021-08-18 | End: 2021-08-18

## 2021-08-18 RX ADMIN — SODIUM CHLORIDE 100 ML: 9 INJECTION, SOLUTION INTRAVENOUS at 09:45

## 2021-08-18 RX ADMIN — PALONOSETRON HYDROCHLORIDE 0.25 MG: 0.25 INJECTION, SOLUTION INTRAVENOUS at 09:43

## 2021-08-18 RX ADMIN — HEPARIN SODIUM (PORCINE) LOCK FLUSH IV SOLN 100 UNIT/ML 500 UNITS: 100 SOLUTION at 12:55

## 2021-08-18 RX ADMIN — SODIUM CHLORIDE, PRESERVATIVE FREE 20 ML: 5 INJECTION INTRAVENOUS at 12:53

## 2021-08-18 RX ADMIN — CYCLOPHOSPHAMIDE 1370 MG: 1 INJECTION, POWDER, FOR SOLUTION INTRAVENOUS; ORAL at 12:16

## 2021-08-18 RX ADMIN — ETOPOSIDE 180 MG: 20 INJECTION INTRAVENOUS at 11:14

## 2021-08-18 RX ADMIN — SODIUM CHLORIDE 250 ML: 9 INJECTION, SOLUTION INTRAVENOUS at 09:40

## 2021-08-18 RX ADMIN — DOXORUBICIN HYDROCHLORIDE 92 MG: 2 INJECTION, SOLUTION INTRAVENOUS at 10:53

## 2021-08-18 RX ADMIN — DEXAMETHASONE SODIUM PHOSPHATE 12 MG: 4 INJECTION, SOLUTION INTRA-ARTICULAR; INTRALESIONAL; INTRAMUSCULAR; INTRAVENOUS; SOFT TISSUE at 10:15

## 2021-08-18 RX ADMIN — VINCRISTINE SULFATE 2 MG: 1 INJECTION, SOLUTION INTRAVENOUS at 10:59

## 2021-08-18 NOTE — ASSESSMENT & PLAN NOTE
Stage IV.  Patient is on treatment with CHOEP chemotherapy.  She is due for cycle 2.  Her labs today look good.  Proceed with cycle as planned.  Given her nausea with last cycle, I recommended taking antiemetics scheduled for the next week to try and mitigate those symptoms.  Push fluid intake.  I will see her back for cycle 3 with labs prior.

## 2021-08-18 NOTE — PROGRESS NOTES
Chief Complaint  Lymphoma (-trt fu)    Ramón Holm MD Roby, Shelley L, JOSE M Kimble          Abiola Garibay presents to [facility] for ongoing treatment of her T-cell lymphoma.  She is on CHOEP chemotherapy.  She notes some nausea for about a week after her last treatment.  Her antiemetics were effective.  She still feels like she is eating and drinking well.  She also had some increased fatigue although able to perform her ADLs.  Since her last visit, she was seen by [facility].  Those records reviewed.  They recommend chemotherapy as she is doing followed by consideration of autologous stem cell transplant.  She denies fever, chills, weight loss, night sweats.  She has ongoing lymphadenopathy in the right groin.  The right leg is much less swollen than it was previously.  She is on Eliquis for blood clot.  She denies excessive bruising or bleeding.  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    Current Outpatient Medications on File Prior to Visit   Medication Sig Dispense Refill   • apixaban (ELIQUIS) 5 MG tablet tablet Take 1 tablet by mouth Every 12 (Twelve) Hours. TAKE 2 TABLETS TWICE A DAY FOR 7 DAYS THEN TAKE 1 TABLET TWICE A DAY (Patient taking differently: Take 5 mg by mouth Every 12 (Twelve) Hours. TAKE 2 TABLETS TWICE A DAY FOR 7 DAYS THEN TAKE 1 TABLET TWICE A DAY  LAST DOSE 7/21/21 WAS HELD FOR BONE MARROW TEST, RX'D BY DR. HOLM,) 60  tablet 5   • docusate sodium (COLACE) 250 MG capsule Take 1 capsule by mouth 2 (Two) Times a Day As Needed for Constipation. 10 capsule 0   • hydroCHLOROthiazide (HYDRODIURIL) 12.5 MG tablet Take 12.5 mg by mouth Daily.     • HYDROcodone-acetaminophen (NORCO) 5-325 MG per tablet Take 1-2 tablets by mouth Every 4 (Four) Hours As Needed (Pain). 20 tablet 0   • norethindrone-ethinyl estradiol (FEMHRT 1/5) 1-5 MG-MCG tablet Take 0.5 tablets by mouth 2 (Two) Times a Day. Combination med of progestrone, estrogen, testosterone, sublingual tab - takes 1/2 in am, 1/2 in pm     • ondansetron (ZOFRAN) 8 MG tablet Take 1 tablet by mouth 3 (Three) Times a Day As Needed for Nausea or Vomiting. 30 tablet 5   • prochlorperazine (COMPAZINE) 10 MG tablet Take 1 tablet by mouth Every 6 (Six) Hours As Needed for Nausea or Vomiting. 30 tablet 3   • [DISCONTINUED] allopurinol (ZYLOPRIM) 100 MG tablet Take 3 tablets by mouth Daily. 30 tablet 3   • [DISCONTINUED] HYDROcodone-acetaminophen (NORCO) 5-325 MG per tablet Take 1-2 tablets by mouth Every 4 (Four) Hours As Needed (Pain).     • [DISCONTINUED] PredniSONE 5 MG tablet therapy pack dosepak Take 1 each by mouth. Take as directed on package instructions.       Current Facility-Administered Medications on File Prior to Visit   Medication Dose Route Frequency Provider Last Rate Last Admin   • [COMPLETED] cyclophosphamide (CYTOXAN) 1,370 mg in sodium chloride 0.9 % 343.5 mL chemo IVPB  750 mg/m2 (Treatment Plan Recorded) Intravenous Once Ramón Holm MD   Stopped at 08/18/21 1246   • [COMPLETED] dexamethasone (DECADRON) IVPB 12 mg  12 mg Intravenous Once Ramón Holm MD   Stopped at 08/18/21 1030   • [COMPLETED] DOXOrubicin (ADRIAMYCIN) chemo injection 92 mg  50 mg/m2 (Treatment Plan Recorded) Intravenous Once Ramón Holm MD   Stopped at 08/18/21 1057   • [COMPLETED] etoposide (TOPOSAR) 180 mg in sodium chloride 0.9 % 559 mL chemo IVPB  100 mg/m2 (Treatment Plan Recorded)  Intravenous Once Ramón Holm MD   Stopped at 08/18/21 1214   • [COMPLETED] FOSAPREPITANT 150 MG/100ML NORMAL SALINE (CBC) IVPB 100 mL 100 mL  150 mg Intravenous Once Ramón Holm MD   Stopped at 08/18/21 1015   • heparin injection 500 Units  500 Units Intravenous PRN Ramón Holm MD   500 Units at 08/18/21 1255   • [COMPLETED] palonosetron (ALOXI) injection 0.25 mg  0.25 mg Intravenous Once Ramón Holm MD   0.25 mg at 08/18/21 0943   • sodium chloride 0.9 % flush 20 mL  20 mL Intravenous PRN Ramón Holm MD   20 mL at 08/18/21 1253   • [COMPLETED] sodium chloride 0.9 % infusion 250 mL  250 mL Intravenous Once Ramón Holm MD   Stopped at 08/18/21 1251   • [COMPLETED] vinCRIStine (ONCOVIN) 2 mg in sodium chloride 0.9 % 57 mL chemo IVPB  2 mg Intravenous Once Ramón Holm MD   Stopped at 08/18/21 1110       Allergies   Allergen Reactions   • Bee Venom Hives   • Lisinopril Other (See Comments)     cough     Past Medical History:   Diagnosis Date   • Acute deep vein thrombosis (DVT) of proximal vein of right lower extremity (CMS/HCC) 7/28/2021   • Acute deep vein thrombosis (DVT) of right lower extremity (CMS/HCC) 07/20/2021   • Enlarged lymph node     R INGUINAL LYMPHNOD BX, NOW A KNOT GROIN   • High blood pressure    • Lymphoma (CMS/HCC)     under care of DR. HOLM [facility]ARE   • Swelling of right lower extremity 7/20/2021     Past Surgical History:   Procedure Laterality Date   • ABDOMINAL HYSTERECTOMY      patient still has her ovaries   • BONE MARROW BIOPSY     • BREAST BIOPSY Left 7/23/2021    Procedure: BREAST BIOPSY BIOPSY OF SKIN, SUBCUTANEOUS TISSUE AND/OR MUCOUS MEMBRANE of the left breast;  Surgeon: Grace Rob MD;  Location: [facility];  Service: General;  Laterality: Left;   • BREAST SURGERY      breast reduction (bilateral)    • COLONOSCOPY     • EXCISION LESION Right 6/9/2021    Procedure: EXCISION OF RIGHT INGUINAL LYMPHNOID;  Surgeon: Heidi  Aaron VOSS MD;  Location: [facility] MAIN OR;  Service: General;  Laterality: Right;   • PORTACATH PLACEMENT Right 7/23/2021    Procedure: INSERTION OF PORTACATH;  Surgeon: Grace Rob MD;  Location: [facility];  Service: General;  Laterality: Right;     Social History     Socioeconomic History   • Marital status:      Spouse name: Not on file   • Number of children: Not on file   • Years of education: Not on file   • Highest education level: Not on file   Tobacco Use   • Smoking status: Never Smoker   • Smokeless tobacco: Never Used   Vaping Use   • Vaping Use: Never used   Substance and Sexual Activity   • Alcohol use: Yes     Comment: rarely    • Drug use: Never   • Sexual activity: Defer     Family History   Problem Relation Age of Onset   • Breast cancer Sister         50S   • Diabetes Paternal Grandmother    • Pancreatic cancer Father    • Malig Hyperthermia Neg Hx        Objective   Physical Exam  Vitals reviewed. Exam conducted with a chaperone present.   Constitutional:       Appearance: Normal appearance.   HENT:      Head: Normocephalic and atraumatic.   Cardiovascular:      Rate and Rhythm: Normal rate and regular rhythm.      Heart sounds: Normal heart sounds. No murmur heard.   No gallop.    Pulmonary:      Effort: Pulmonary effort is normal.      Breath sounds: Normal breath sounds.      Comments: Port-A-Cath in place  Abdominal:      General: Abdomen is flat. Bowel sounds are normal. There is no distension.      Palpations: Abdomen is soft.      Tenderness: There is no abdominal tenderness.   Musculoskeletal:      Cervical back: Neck supple.      Right lower leg: Edema (Lymphedema of the lower extremity, 1+, improved over previous) present.      Left lower leg: No edema.   Lymphadenopathy:      Cervical: No cervical adenopathy.      Lower Body: Right inguinal adenopathy present.   Neurological:      Mental Status: She is alert and oriented to person, place, and time.   Psychiatric:          Mood and Affect: Mood normal.         Behavior: Behavior normal.         Vitals:    08/18/21 0838   BP: 105/58   Pulse: 58   Resp: 18   Temp: 98.6 °F (37 °C)   SpO2: 99%   Weight: 72.9 kg (160 lb 11.5 oz)   PainSc: 0-No pain     ECOG score: 0         PHQ-9 Total Score:                    Result Review :   The following data was reviewed by: Ramón Holm MD on 08/18/2021:  Lab Results   Component Value Date    HGB 11.6 (L) 08/18/2021    HCT 33.8 (L) 08/18/2021    MCV 90.1 08/18/2021     08/18/2021    WBC 11.04 (H) 08/18/2021    NEUTROABS 4.36 08/18/2021    NEUTROABS 4.97 08/18/2021    LYMPHSABS 3.15 (H) 08/18/2021    MONOSABS 2.60 (H) 08/18/2021    EOSABS 0.00 08/18/2021    BASOSABS 0.09 08/18/2021     Lab Results   Component Value Date    GLUCOSE 104 (H) 08/18/2021    BUN 7 08/18/2021    CREATININE 0.84 08/18/2021     08/18/2021    K 3.7 08/18/2021     (H) 08/18/2021    CO2 23.9 08/18/2021    CALCIUM 9.5 08/18/2021    PROTEINTOT 6.3 08/18/2021    ALBUMIN 4.03 08/18/2021    BILITOT 0.2 08/18/2021    ALKPHOS 109 08/18/2021     (H) 08/18/2021    ALT 67 (H) 08/18/2021           Assessment and Plan    Diagnoses and all orders for this visit:    1. 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  Patient is on treatment with CHOEP chemotherapy.  She is due for cycle 2.  Her labs today look good.  Proceed with cycle as planned.  Given her nausea with last cycle, I recommended taking antiemetics scheduled for the next week to try and mitigate those symptoms.  Push fluid intake.  I will see her back for cycle 3 with labs prior.    Orders:  -     Cancel: sodium chloride 0.9 % infusion 250 mL  -     Cancel: palonosetron (ALOXI) injection 0.25 mg  -     Cancel: fosaprepitant (EMEND) 150 mg in sodium chloride 0.9 % 100 mL IVPB  -     Cancel: dexamethasone (DECADRON) 12 mg in sodium chloride 0.9 % IVPB  -     Cancel: DOXOrubicin (ADRIAMYCIN) chemo  injection 92 mg  -     Cancel: vinCRIStine (ONCOVIN) 2 mg in sodium chloride 0.9 % 52 mL chemo IVPB  -     Cancel: etoposide (TOPOSAR) 180 mg in sodium chloride 0.9 % 509 mL chemo IVPB  -     Cancel: cyclophosphamide (CYTOXAN) 1,370 mg in sodium chloride 0.9 % 318.5 mL chemo IVPB    2. Acute deep vein thrombosis (DVT) of proximal vein of right lower extremity (CMS/HCC)  Assessment & Plan:  Patient is on anticoagulation with Eliquis.  Her right leg is improved compared to previous.  She does not have any signs or symptoms of excessive bleeding related to her anticoagulation therapy.  Continue same.      Other orders  -     Cancel: allopurinol (ZYLOPRIM) 100 MG tablet; Take 3 tablets by mouth Daily.  Dispense: 90 tablet; Refill: 3  -     sodium chloride 0.9 % infusion 250 mL  -     dexamethasone (DECADRON) 12 mg in sodium chloride 0.9 % IVPB  -     etoposide (TOPOSAR) 180 mg in sodium chloride 0.9 % 509 mL chemo IVPB  -     pegfilgrastim (NEULASTA ONPRO) on-body injector 6 mg  -     sodium chloride 0.9 % infusion 250 mL  -     dexamethasone (DECADRON) 12 mg in sodium chloride 0.9 % IVPB  -     etoposide (TOPOSAR) 180 mg in sodium chloride 0.9 % 509 mL chemo IVPB  -     OK To Treat              Patient was given instructions and counseling regarding her condition or for health maintenance advice. Please see specific information pulled into the AVS if appropriate.     Ramón Holm MD    8/18/2021

## 2021-08-18 NOTE — ASSESSMENT & PLAN NOTE
Patient is on anticoagulation with Eliquis.  Her right leg is improved compared to previous.  She does not have any signs or symptoms of excessive bleeding related to her anticoagulation therapy.  Continue same.

## 2021-08-18 NOTE — PROGRESS NOTES
Attempt made to contact pt via phone to discuss nutrition POC, however, no answer. Left voicemail with oncology RD call back number. Will await return call and/or attempt at a later time. Will f/u per protocol.

## 2021-08-18 NOTE — PROGRESS NOTES
Outpatient Nutrition Oncology Follow Up    Patient Name: Abiola Garibay  YOB: 1965  MRN: [id number]  Assessment Date: 8/18/2021    CLINICAL NUTRITION ASSESSMENT  Lymphoma       Type of Cancer Treatment  Doxorubicin, Vincristine, Etoposide, Cyclophosphamide, Etoposide       CLINICAL NUTRITION ASSESSMENT      Reason for Assessment  Follow-up protocol     H&P:    Past Medical History:   Diagnosis Date   • Acute deep vein thrombosis (DVT) of proximal vein of right lower extremity (CMS/HCC) 7/28/2021   • Acute deep vein thrombosis (DVT) of right lower extremity (CMS/HCC) 07/20/2021   • Enlarged lymph node     R INGUINAL LYMPHNOD BX, NOW A KNOT GROIN   • High blood pressure    • Lymphoma (CMS/HCC)     under care of DR. 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    Weight Hx  Wt Readings from Last 30 Encounters:   08/18/21 0838 72.9 kg (160 lb 11.5 oz)   08/18/21 0809 72.9 kg (160 lb 11.5 oz)   07/30/21 1031 79.9 kg (176 lb 2.4 oz)   07/29/21 1333 79.1 kg (174 lb 6.1 oz)   07/29/21 0919 79.4 kg (175 lb)   07/28/21 0802 77.1 kg (169 lb 15.6 oz)   07/27/21 1003 76.2 kg (167 lb 15.9 oz)   07/23/21 0910 74.5 kg (164 lb 3.9 oz)   07/22/21 0916 73.9 kg (163 lb)   07/22/21 0955 73.9 kg (163 lb)   07/20/21 0801 74.3 kg (163 lb 12.8 oz)   07/19/21 1125 74.4 kg (164 lb)   06/23/21 1104 75.3 kg (166 lb)   06/09/21 1257 73.8 kg (162 lb 11.2 oz)   06/04/21 0822 74.8 kg (165 lb)   06/03/21 0000 74.8 kg (165  lb)        Labs/Medications        Pertinent Labs Reviewed.   Results from last 7 days   Lab Units 08/18/21  0806   SODIUM mmol/L 143   POTASSIUM mmol/L 3.7   CHLORIDE mmol/L 108*   CO2 mmol/L 23.9   BUN mg/dL 7   CREATININE mg/dL 0.84   CALCIUM mg/dL 9.5   BILIRUBIN mg/dL 0.2   ALK PHOS U/L 109   ALT (SGPT) U/L 67*   AST (SGOT) U/L 108*   GLUCOSE mg/dL 104*     Results from last 7 days   Lab Units 08/18/21  0806   HEMOGLOBIN g/dL 11.6*   HEMATOCRIT % 33.8*     No results found for: COVID19  No results found for: HGBA1C      Pertinent Medications HYDROcodone-acetaminophen, apixaban, docusate sodium, hydroCHLOROthiazide, norethindrone-ethinyl estradiol, ondansetron, and prochlorperazine     Physical Findings            Current Nutrition Orders & Evaluation of Intake       Oral Nutrition     Current PO Diet    Supplement      Enteral Nutrition     Current Formula    Schedule      Parenteral Nutrition     Current Prescription    Schedule      Nutrition Diagnosis        Nutrition Dx Problem 1 Increased nutrient needs related to increased nutrient needs due to catabolic disease as evidenced by physiological causes increasing nutrient needs.       Nutrition Intervention       RD Action Nutritional Counseling  Nutrition F/U     Monitor/Evaluation       Monitor Per oncology nutrition protocol.     Comments:  Attempt made to contact pt X 2, however, no answer. Oncology RD left voicemail with call back number. Per EMR, pt with recent weight loss of 7kg / 9% X 3 weeks. Pt does have some weight fluctuations over the past couple of months. Per flowsheets, pt with noted Grade 1 constipation and nausea. Would recommend 6 small meals/day, avoiding known gastric irritants (fried / greasy foods, spicy foods). Would also recommend slowly adding fiber rich containing foods as tolerated as well as adequate fluid to aid constipation (although, this may lead to nausea). If able, also recommend oral nutrition supplement to help meet  increased nutrient needs (avoiding chocolate flavor). Will continue to f/u per protocol.     Due to the current Covid-19 pandemic and circumstances of having to work remotely, nutrition assessment was conducted using review of the medical record and/or telephone interview of the patient (in which permission was obtained prior to our discussion).     Electronically signed by:  Nery Castro RD  08/18/21 13:33 EDT

## 2021-08-19 ENCOUNTER — HOSPITAL ENCOUNTER (OUTPATIENT)
Dept: ONCOLOGY | Facility: HOSPITAL | Age: 56
Setting detail: INFUSION SERIES
Discharge: HOME | End: 2021-08-19

## 2021-08-19 VITALS
TEMPERATURE: 97.6 F | HEART RATE: 85 BPM | OXYGEN SATURATION: 100 % | HEIGHT: 65 IN | SYSTOLIC BLOOD PRESSURE: 120 MMHG | BODY MASS INDEX: 27.29 KG/M2 | WEIGHT: 163.8 LBS | DIASTOLIC BLOOD PRESSURE: 62 MMHG | RESPIRATION RATE: 18 BRPM

## 2021-08-19 DIAGNOSIS — C84.45 PERIPHERAL T CELL LYMPHOMA OF INGUINAL REGION (HCC): Primary | ICD-10-CM

## 2021-08-19 DIAGNOSIS — Z45.2 ENCOUNTER FOR ADJUSTMENT OR MANAGEMENT OF VASCULAR ACCESS DEVICE: ICD-10-CM

## 2021-08-19 PROCEDURE — 25010000002 ETOPOSIDE 1 GM/50ML SOLUTION 50 ML VIAL: Performed by: INTERNAL MEDICINE

## 2021-08-19 PROCEDURE — 96375 TX/PRO/DX INJ NEW DRUG ADDON: CPT

## 2021-08-19 PROCEDURE — 25010000002 DEXAMETHASONE SODIUM PHOSPHATE 120 MG/30ML SOLUTION: Performed by: INTERNAL MEDICINE

## 2021-08-19 PROCEDURE — 96413 CHEMO IV INFUSION 1 HR: CPT

## 2021-08-19 PROCEDURE — 25010000002 HEPARIN LOCK FLUSH PER 10 UNITS: Performed by: INTERNAL MEDICINE

## 2021-08-19 RX ORDER — SODIUM CHLORIDE 9 MG/ML
250 INJECTION, SOLUTION INTRAVENOUS ONCE
Status: COMPLETED | OUTPATIENT
Start: 2021-08-19 | End: 2021-08-19

## 2021-08-19 RX ORDER — SODIUM CHLORIDE 0.9 % (FLUSH) 0.9 %
20 SYRINGE (ML) INJECTION AS NEEDED
Status: CANCELLED | OUTPATIENT
Start: 2021-08-19

## 2021-08-19 RX ORDER — HEPARIN SODIUM (PORCINE) LOCK FLUSH IV SOLN 100 UNIT/ML 100 UNIT/ML
500 SOLUTION INTRAVENOUS AS NEEDED
Status: DISCONTINUED | OUTPATIENT
Start: 2021-08-19 | End: 2021-08-20 | Stop reason: HOSPADM

## 2021-08-19 RX ORDER — SODIUM CHLORIDE 0.9 % (FLUSH) 0.9 %
20 SYRINGE (ML) INJECTION AS NEEDED
Status: DISCONTINUED | OUTPATIENT
Start: 2021-08-19 | End: 2021-08-20 | Stop reason: HOSPADM

## 2021-08-19 RX ORDER — HEPARIN SODIUM (PORCINE) LOCK FLUSH IV SOLN 100 UNIT/ML 100 UNIT/ML
500 SOLUTION INTRAVENOUS AS NEEDED
Status: CANCELLED | OUTPATIENT
Start: 2021-08-19

## 2021-08-19 RX ADMIN — SODIUM CHLORIDE 250 ML: 9 INJECTION, SOLUTION INTRAVENOUS at 14:38

## 2021-08-19 RX ADMIN — HEPARIN SODIUM (PORCINE) LOCK FLUSH IV SOLN 100 UNIT/ML 500 UNITS: 100 SOLUTION at 16:13

## 2021-08-19 RX ADMIN — ETOPOSIDE 180 MG: 20 INJECTION INTRAVENOUS at 15:09

## 2021-08-19 RX ADMIN — DEXAMETHASONE SODIUM PHOSPHATE 12 MG: 4 INJECTION, SOLUTION INTRA-ARTICULAR; INTRALESIONAL; INTRAMUSCULAR; INTRAVENOUS; SOFT TISSUE at 14:39

## 2021-08-19 RX ADMIN — SODIUM CHLORIDE, PRESERVATIVE FREE 20 ML: 5 INJECTION INTRAVENOUS at 16:13

## 2021-08-20 ENCOUNTER — HOSPITAL ENCOUNTER (OUTPATIENT)
Dept: ONCOLOGY | Facility: HOSPITAL | Age: 56
Setting detail: INFUSION SERIES
Discharge: HOME | End: 2021-08-20

## 2021-08-20 ENCOUNTER — HOSPITAL ENCOUNTER (OUTPATIENT)
Dept: CARDIOLOGY | Facility: HOSPITAL | Age: 56
Discharge: HOME | End: 2021-08-20
Admitting: NURSE PRACTITIONER

## 2021-08-20 ENCOUNTER — HOSPITAL ENCOUNTER (OUTPATIENT)
Dept: ONCOLOGY | Facility: HOSPITAL | Age: 56
Setting detail: INFUSION SERIES
End: 2021-08-20

## 2021-08-20 VITALS
BODY MASS INDEX: 27.22 KG/M2 | TEMPERATURE: 97.6 F | HEIGHT: 65 IN | RESPIRATION RATE: 18 BRPM | SYSTOLIC BLOOD PRESSURE: 111 MMHG | OXYGEN SATURATION: 100 % | WEIGHT: 163.36 LBS | DIASTOLIC BLOOD PRESSURE: 67 MMHG | HEART RATE: 72 BPM

## 2021-08-20 DIAGNOSIS — C84.45 PERIPHERAL T CELL LYMPHOMA OF INGUINAL REGION (HCC): ICD-10-CM

## 2021-08-20 DIAGNOSIS — R22.41 LOCALIZED SWELLING OF RIGHT LOWER EXTREMITY: Primary | ICD-10-CM

## 2021-08-20 DIAGNOSIS — Z45.2 ENCOUNTER FOR ADJUSTMENT OR MANAGEMENT OF VASCULAR ACCESS DEVICE: Primary | ICD-10-CM

## 2021-08-20 DIAGNOSIS — R22.41 LOCALIZED SWELLING OF RIGHT LOWER EXTREMITY: ICD-10-CM

## 2021-08-20 LAB
BH CV LOW VAS RIGHT COMMON FEMORAL SPONT: 1
BH CV LOW VAS RIGHT EXTERNAL ILIAC SPONT: 1
BH CV LOW VAS RIGHT SAPHENOFEMORAL JUNCTION SPONT: 1
BH CV LOWER VASCULAR RIGHT COMMON FEMORAL COMPRESS: NORMAL
BH CV LOWER VASCULAR RIGHT COMMON FEMORAL SPONT: NORMAL
BH CV LOWER VASCULAR RIGHT DISTAL FEMORAL COMPRESS: NORMAL
BH CV LOWER VASCULAR RIGHT EXTERNAL ILIAC COMPRESS: NORMAL
BH CV LOWER VASCULAR RIGHT EXTERNAL ILIAC SPONT: NORMAL
BH CV LOWER VASCULAR RIGHT GREATER SAPH AK COMPRESS: NORMAL
BH CV LOWER VASCULAR RIGHT MID FEMORAL AUGMENT: NORMAL
BH CV LOWER VASCULAR RIGHT MID FEMORAL COMPETENT: NORMAL
BH CV LOWER VASCULAR RIGHT MID FEMORAL COMPRESS: NORMAL
BH CV LOWER VASCULAR RIGHT MID FEMORAL PHASIC: NORMAL
BH CV LOWER VASCULAR RIGHT MID FEMORAL SPONT: NORMAL
BH CV LOWER VASCULAR RIGHT PERONEAL COMPRESS: NORMAL
BH CV LOWER VASCULAR RIGHT POPLITEAL AUGMENT: NORMAL
BH CV LOWER VASCULAR RIGHT POPLITEAL COMPETENT: NORMAL
BH CV LOWER VASCULAR RIGHT POPLITEAL COMPRESS: NORMAL
BH CV LOWER VASCULAR RIGHT POPLITEAL PHASIC: NORMAL
BH CV LOWER VASCULAR RIGHT POPLITEAL SPONT: NORMAL
BH CV LOWER VASCULAR RIGHT POSTERIOR TIBIAL COMPRESS: NORMAL
BH CV LOWER VASCULAR RIGHT PROXIMAL FEMORAL COMPRESS: NORMAL
BH CV LOWER VASCULAR RIGHT SAPHENOFEMORAL JUNCTION COMPRESS: NORMAL
MAXIMAL PREDICTED HEART RATE: 163 BPM
STRESS TARGET HR: 139 BPM

## 2021-08-20 PROCEDURE — 96413 CHEMO IV INFUSION 1 HR: CPT

## 2021-08-20 PROCEDURE — 96377 APPLICATON ON-BODY INJECTOR: CPT

## 2021-08-20 PROCEDURE — 93971 EXTREMITY STUDY: CPT | Performed by: SURGERY

## 2021-08-20 PROCEDURE — 25010000002 DEXAMETHASONE SODIUM PHOSPHATE 120 MG/30ML SOLUTION: Performed by: INTERNAL MEDICINE

## 2021-08-20 PROCEDURE — 25010000002 HEPARIN LOCK FLUSH PER 10 UNITS: Performed by: INTERNAL MEDICINE

## 2021-08-20 PROCEDURE — 25010000002 ETOPOSIDE 1 GM/50ML SOLUTION 50 ML VIAL: Performed by: INTERNAL MEDICINE

## 2021-08-20 PROCEDURE — 25010000002 PEGFILGRASTIM 6 MG/0.6ML PREFILLED SYRINGE KIT: Performed by: INTERNAL MEDICINE

## 2021-08-20 PROCEDURE — 96375 TX/PRO/DX INJ NEW DRUG ADDON: CPT

## 2021-08-20 PROCEDURE — 93971 EXTREMITY STUDY: CPT

## 2021-08-20 RX ORDER — HEPARIN SODIUM (PORCINE) LOCK FLUSH IV SOLN 100 UNIT/ML 100 UNIT/ML
500 SOLUTION INTRAVENOUS AS NEEDED
Status: CANCELLED | OUTPATIENT
Start: 2021-08-20

## 2021-08-20 RX ORDER — SODIUM CHLORIDE 9 MG/ML
250 INJECTION, SOLUTION INTRAVENOUS ONCE
Status: COMPLETED | OUTPATIENT
Start: 2021-08-20 | End: 2021-08-20

## 2021-08-20 RX ORDER — SODIUM CHLORIDE 0.9 % (FLUSH) 0.9 %
20 SYRINGE (ML) INJECTION AS NEEDED
Status: DISCONTINUED | OUTPATIENT
Start: 2021-08-20 | End: 2021-08-21 | Stop reason: HOSPADM

## 2021-08-20 RX ORDER — HEPARIN SODIUM (PORCINE) LOCK FLUSH IV SOLN 100 UNIT/ML 100 UNIT/ML
500 SOLUTION INTRAVENOUS AS NEEDED
Status: DISCONTINUED | OUTPATIENT
Start: 2021-08-20 | End: 2021-08-21 | Stop reason: HOSPADM

## 2021-08-20 RX ORDER — SODIUM CHLORIDE 0.9 % (FLUSH) 0.9 %
20 SYRINGE (ML) INJECTION AS NEEDED
Status: CANCELLED | OUTPATIENT
Start: 2021-08-20

## 2021-08-20 RX ADMIN — HEPARIN SODIUM (PORCINE) LOCK FLUSH IV SOLN 100 UNIT/ML 500 UNITS: 100 SOLUTION at 15:17

## 2021-08-20 RX ADMIN — ETOPOSIDE 180 MG: 20 INJECTION INTRAVENOUS at 14:08

## 2021-08-20 RX ADMIN — PEGFILGRASTIM 6 MG: KIT SUBCUTANEOUS at 15:12

## 2021-08-20 RX ADMIN — SODIUM CHLORIDE, PRESERVATIVE FREE 20 ML: 5 INJECTION INTRAVENOUS at 15:17

## 2021-08-20 RX ADMIN — SODIUM CHLORIDE 250 ML: 9 INJECTION, SOLUTION INTRAVENOUS at 13:42

## 2021-08-20 RX ADMIN — DEXAMETHASONE SODIUM PHOSPHATE 12 MG: 4 INJECTION, SOLUTION INTRA-ARTICULAR; INTRALESIONAL; INTRAMUSCULAR; INTRAVENOUS; SOFT TISSUE at 13:44

## 2021-09-08 ENCOUNTER — APPOINTMENT (OUTPATIENT)
Dept: ONCOLOGY | Facility: HOSPITAL | Age: 56
End: 2021-09-08

## 2021-09-08 ENCOUNTER — TELEPHONE (OUTPATIENT)
Dept: NUTRITION | Facility: HOSPITAL | Age: 56
End: 2021-09-08

## 2021-09-08 ENCOUNTER — HOSPITAL ENCOUNTER (OUTPATIENT)
Dept: ONCOLOGY | Facility: HOSPITAL | Age: 56
Setting detail: INFUSION SERIES
Discharge: HOME | End: 2021-09-08

## 2021-09-08 ENCOUNTER — OFFICE VISIT (OUTPATIENT)
Dept: ONCOLOGY | Facility: HOSPITAL | Age: 56
End: 2021-09-08

## 2021-09-08 VITALS
SYSTOLIC BLOOD PRESSURE: 121 MMHG | OXYGEN SATURATION: 100 % | DIASTOLIC BLOOD PRESSURE: 41 MMHG | HEIGHT: 65 IN | RESPIRATION RATE: 18 BRPM | BODY MASS INDEX: 26.85 KG/M2 | TEMPERATURE: 97.3 F | HEART RATE: 65 BPM | WEIGHT: 161.16 LBS

## 2021-09-08 VITALS
BODY MASS INDEX: 26.82 KG/M2 | SYSTOLIC BLOOD PRESSURE: 121 MMHG | OXYGEN SATURATION: 100 % | HEART RATE: 65 BPM | TEMPERATURE: 97.3 F | DIASTOLIC BLOOD PRESSURE: 41 MMHG | RESPIRATION RATE: 18 BRPM | WEIGHT: 161.16 LBS

## 2021-09-08 DIAGNOSIS — C84.45 PERIPHERAL T CELL LYMPHOMA OF INGUINAL REGION (HCC): Primary | ICD-10-CM

## 2021-09-08 DIAGNOSIS — C84.45 PERIPHERAL T CELL LYMPHOMA OF INGUINAL REGION (HCC): ICD-10-CM

## 2021-09-08 DIAGNOSIS — I82.4Y1 ACUTE DEEP VEIN THROMBOSIS (DVT) OF PROXIMAL VEIN OF RIGHT LOWER EXTREMITY (HCC): ICD-10-CM

## 2021-09-08 DIAGNOSIS — Z45.2 ENCOUNTER FOR ADJUSTMENT OR MANAGEMENT OF VASCULAR ACCESS DEVICE: Primary | ICD-10-CM

## 2021-09-08 LAB
ALBUMIN SERPL-MCNC: 4.06 G/DL (ref 3.5–5.2)
ALBUMIN/GLOB SERPL: 1.9 G/DL
ALP SERPL-CCNC: 124 U/L (ref 39–117)
ALT SERPL W P-5'-P-CCNC: 87 U/L (ref 1–33)
ANION GAP SERPL CALCULATED.3IONS-SCNC: 10.7 MMOL/L (ref 5–15)
ANISOCYTOSIS BLD QL: ABNORMAL
AST SERPL-CCNC: 153 U/L (ref 1–32)
BASOPHILS # BLD AUTO: 0.09 10*3/MM3 (ref 0–0.2)
BASOPHILS NFR BLD AUTO: 0.5 % (ref 0–1.5)
BILIRUB SERPL-MCNC: 0.3 MG/DL (ref 0–1.2)
BUN SERPL-MCNC: 8 MG/DL (ref 6–20)
BUN/CREAT SERPL: 11.3 (ref 7–25)
CALCIUM SPEC-SCNC: 9.6 MG/DL (ref 8.6–10.5)
CHLORIDE SERPL-SCNC: 109 MMOL/L (ref 98–107)
CO2 SERPL-SCNC: 22.3 MMOL/L (ref 22–29)
CREAT SERPL-MCNC: 0.71 MG/DL (ref 0.57–1)
DEPRECATED RDW RBC AUTO: 48.5 FL (ref 37–54)
EOSINOPHIL # BLD AUTO: 0.02 10*3/MM3 (ref 0–0.4)
EOSINOPHIL NFR BLD AUTO: 0.1 % (ref 0.3–6.2)
ERYTHROCYTE [DISTWIDTH] IN BLOOD BY AUTOMATED COUNT: 16.8 % (ref 12.3–15.4)
GFR SERPL CREATININE-BSD FRML MDRD: 103 ML/MIN/1.73
GLOBULIN UR ELPH-MCNC: 2.1 GM/DL
GLUCOSE SERPL-MCNC: 89 MG/DL (ref 65–99)
HCT VFR BLD AUTO: 27.7 % (ref 34–46.6)
HGB BLD-MCNC: 9.5 G/DL (ref 12–15.9)
IMM GRANULOCYTES # BLD AUTO: 1.29 10*3/MM3 (ref 0–0.05)
IMM GRANULOCYTES NFR BLD AUTO: 7.6 % (ref 0–0.5)
LYMPHOCYTES # BLD AUTO: 4.63 10*3/MM3 (ref 0.7–3.1)
LYMPHOCYTES # BLD MANUAL: 4.08 10*3/MM3 (ref 0.7–3.1)
LYMPHOCYTES NFR BLD AUTO: 27.2 % (ref 19.6–45.3)
LYMPHOCYTES NFR BLD MANUAL: 11 % (ref 5–12)
LYMPHOCYTES NFR BLD MANUAL: 24 % (ref 19.6–45.3)
MACROCYTES BLD QL SMEAR: ABNORMAL
MCH RBC QN AUTO: 32 PG (ref 26.6–33)
MCHC RBC AUTO-ENTMCNC: 34.3 G/DL (ref 31.5–35.7)
MCV RBC AUTO: 93.3 FL (ref 79–97)
METAMYELOCYTES NFR BLD MANUAL: 3 % (ref 0–0)
MONOCYTES # BLD AUTO: 1.87 10*3/MM3 (ref 0.1–0.9)
MONOCYTES # BLD AUTO: 2.98 10*3/MM3 (ref 0.1–0.9)
MONOCYTES NFR BLD AUTO: 17.5 % (ref 5–12)
MYELOCYTES NFR BLD MANUAL: 1 % (ref 0–0)
NEUTROPHILS # BLD AUTO: 10.38 10*3/MM3 (ref 1.7–7)
NEUTROPHILS NFR BLD AUTO: 47.1 % (ref 42.7–76)
NEUTROPHILS NFR BLD AUTO: 8.01 10*3/MM3 (ref 1.7–7)
NEUTROPHILS NFR BLD MANUAL: 61 % (ref 42.7–76)
NRBC SPEC MANUAL: 8 /100 WBC (ref 0–0.2)
PLATELET # BLD AUTO: 209 10*3/MM3 (ref 140–450)
PMV BLD AUTO: 10 FL (ref 6–12)
POLYCHROMASIA BLD QL SMEAR: ABNORMAL
POTASSIUM SERPL-SCNC: 3.7 MMOL/L (ref 3.5–5.2)
PROT SERPL-MCNC: 6.2 G/DL (ref 6–8.5)
RBC # BLD AUTO: 2.97 10*6/MM3 (ref 3.77–5.28)
SMALL PLATELETS BLD QL SMEAR: ADEQUATE
SODIUM SERPL-SCNC: 142 MMOL/L (ref 136–145)
WBC # BLD AUTO: 17.02 10*3/MM3 (ref 3.4–10.8)
WBC MORPH BLD: NORMAL

## 2021-09-08 PROCEDURE — 96411 CHEMO IV PUSH ADDL DRUG: CPT

## 2021-09-08 PROCEDURE — 96413 CHEMO IV INFUSION 1 HR: CPT

## 2021-09-08 PROCEDURE — 96367 TX/PROPH/DG ADDL SEQ IV INF: CPT

## 2021-09-08 PROCEDURE — 85007 BL SMEAR W/DIFF WBC COUNT: CPT | Performed by: INTERNAL MEDICINE

## 2021-09-08 PROCEDURE — 96375 TX/PRO/DX INJ NEW DRUG ADDON: CPT

## 2021-09-08 PROCEDURE — 99214 OFFICE O/P EST MOD 30 MIN: CPT | Performed by: INTERNAL MEDICINE

## 2021-09-08 PROCEDURE — 25010000002 DEXAMETHASONE SODIUM PHOSPHATE 120 MG/30ML SOLUTION: Performed by: INTERNAL MEDICINE

## 2021-09-08 PROCEDURE — 25010000002 ETOPOSIDE 1 GM/50ML SOLUTION 50 ML VIAL: Performed by: INTERNAL MEDICINE

## 2021-09-08 PROCEDURE — 25010000002 FOSAPREPITANT PER 1 MG: Performed by: INTERNAL MEDICINE

## 2021-09-08 PROCEDURE — 25010000002 HEPARIN LOCK FLUSH PER 10 UNITS: Performed by: INTERNAL MEDICINE

## 2021-09-08 PROCEDURE — 25010000002 PALONOSETRON PER 25 MCG: Performed by: INTERNAL MEDICINE

## 2021-09-08 PROCEDURE — 80053 COMPREHEN METABOLIC PANEL: CPT | Performed by: INTERNAL MEDICINE

## 2021-09-08 PROCEDURE — 25010000002 VINCRISTINE PER 1 MG: Performed by: INTERNAL MEDICINE

## 2021-09-08 PROCEDURE — 85025 COMPLETE CBC W/AUTO DIFF WBC: CPT | Performed by: INTERNAL MEDICINE

## 2021-09-08 PROCEDURE — 25010000002 DOXORUBICIN PER 10 MG: Performed by: INTERNAL MEDICINE

## 2021-09-08 PROCEDURE — 25010000002 CYCLOPHOSPHAMIDE PER 100 MG: Performed by: INTERNAL MEDICINE

## 2021-09-08 PROCEDURE — 96417 CHEMO IV INFUS EACH ADDL SEQ: CPT

## 2021-09-08 RX ORDER — PALONOSETRON 0.05 MG/ML
0.25 INJECTION, SOLUTION INTRAVENOUS ONCE
Status: COMPLETED | OUTPATIENT
Start: 2021-09-08 | End: 2021-09-08

## 2021-09-08 RX ORDER — SODIUM CHLORIDE 9 MG/ML
250 INJECTION, SOLUTION INTRAVENOUS ONCE
Status: CANCELLED | OUTPATIENT
Start: 2021-09-08

## 2021-09-08 RX ORDER — SODIUM CHLORIDE 0.9 % (FLUSH) 0.9 %
20 SYRINGE (ML) INJECTION AS NEEDED
Status: CANCELLED | OUTPATIENT
Start: 2021-09-08

## 2021-09-08 RX ORDER — SODIUM CHLORIDE 9 MG/ML
250 INJECTION, SOLUTION INTRAVENOUS ONCE
Status: CANCELLED | OUTPATIENT
Start: 2021-09-10

## 2021-09-08 RX ORDER — ALLOPURINOL 100 MG/1
300 TABLET ORAL DAILY
COMMUNITY
Start: 2021-08-26 | End: 2021-09-08 | Stop reason: SDUPTHER

## 2021-09-08 RX ORDER — DOXORUBICIN HYDROCHLORIDE 2 MG/ML
25 INJECTION, SOLUTION INTRAVENOUS ONCE
Status: COMPLETED | OUTPATIENT
Start: 2021-09-08 | End: 2021-09-08

## 2021-09-08 RX ORDER — SODIUM CHLORIDE 0.9 % (FLUSH) 0.9 %
20 SYRINGE (ML) INJECTION AS NEEDED
Status: DISCONTINUED | OUTPATIENT
Start: 2021-09-08 | End: 2021-09-09 | Stop reason: HOSPADM

## 2021-09-08 RX ORDER — PALONOSETRON 0.05 MG/ML
0.25 INJECTION, SOLUTION INTRAVENOUS ONCE
Status: CANCELLED | OUTPATIENT
Start: 2021-09-08

## 2021-09-08 RX ORDER — HEPARIN SODIUM (PORCINE) LOCK FLUSH IV SOLN 100 UNIT/ML 100 UNIT/ML
500 SOLUTION INTRAVENOUS AS NEEDED
Status: DISCONTINUED | OUTPATIENT
Start: 2021-09-08 | End: 2021-09-09 | Stop reason: HOSPADM

## 2021-09-08 RX ORDER — DOXORUBICIN HYDROCHLORIDE 2 MG/ML
25 INJECTION, SOLUTION INTRAVENOUS ONCE
Status: CANCELLED | OUTPATIENT
Start: 2021-09-08

## 2021-09-08 RX ORDER — SODIUM CHLORIDE 9 MG/ML
250 INJECTION, SOLUTION INTRAVENOUS ONCE
Status: CANCELLED | OUTPATIENT
Start: 2021-09-09

## 2021-09-08 RX ORDER — SODIUM CHLORIDE 9 MG/ML
250 INJECTION, SOLUTION INTRAVENOUS ONCE
Status: COMPLETED | OUTPATIENT
Start: 2021-09-08 | End: 2021-09-08

## 2021-09-08 RX ORDER — HEPARIN SODIUM (PORCINE) LOCK FLUSH IV SOLN 100 UNIT/ML 100 UNIT/ML
500 SOLUTION INTRAVENOUS AS NEEDED
Status: CANCELLED | OUTPATIENT
Start: 2021-09-08

## 2021-09-08 RX ADMIN — HEPARIN SODIUM (PORCINE) LOCK FLUSH IV SOLN 100 UNIT/ML 500 UNITS: 100 SOLUTION at 15:00

## 2021-09-08 RX ADMIN — PALONOSETRON HYDROCHLORIDE 0.25 MG: 0.25 INJECTION, SOLUTION INTRAVENOUS at 12:10

## 2021-09-08 RX ADMIN — SODIUM CHLORIDE 250 ML: 9 INJECTION, SOLUTION INTRAVENOUS at 11:54

## 2021-09-08 RX ADMIN — SODIUM CHLORIDE, PRESERVATIVE FREE 20 ML: 5 INJECTION INTRAVENOUS at 15:00

## 2021-09-08 RX ADMIN — SODIUM CHLORIDE 100 ML: 9 INJECTION, SOLUTION INTRAVENOUS at 12:12

## 2021-09-08 RX ADMIN — ETOPOSIDE 90 MG: 20 INJECTION INTRAVENOUS at 13:20

## 2021-09-08 RX ADMIN — DEXAMETHASONE SODIUM PHOSPHATE 12 MG: 4 INJECTION, SOLUTION INTRA-ARTICULAR; INTRALESIONAL; INTRAMUSCULAR; INTRAVENOUS; SOFT TISSUE at 11:54

## 2021-09-08 RX ADMIN — DOXORUBICIN HYDROCHLORIDE 46 MG: 2 INJECTION, SOLUTION INTRAVENOUS at 12:49

## 2021-09-08 RX ADMIN — CYCLOPHOSPHAMIDE 1000 MG: 1 INJECTION, POWDER, FOR SOLUTION INTRAVENOUS; ORAL at 14:24

## 2021-09-08 RX ADMIN — VINCRISTINE SULFATE 2 MG: 1 INJECTION, SOLUTION INTRAVENOUS at 13:03

## 2021-09-08 NOTE — PROGRESS NOTES
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  She is on CHOEP regimen.  She is due for cycle 3.  She states she had more fatigue with her last cycle which lasted for around a week.  She also had some nausea but was still able to eat and drink.  She was taking antiemetics to good effect.  She denies vomiting.  Her right leg is less swollen and is feeling better.  She continues to take Eliquis.  She denies excessive bruising or bleeding.  The right inguinal area remains swollen but perhaps slightly better.  She denies fever, chills, weight loss, night sweats.  No issues from her Port-A-Cath.  She denies chest pain, shortness of breath, lower extremity swelling.    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   HENT: Negative for hearing loss, mouth sores, sore throat, swollen glands, trouble swallowing and voice change.    Eyes: Negative for blurred vision.   Respiratory: Negative for cough, shortness of breath and wheezing.    Cardiovascular: Negative for chest pain and palpitations.   Gastrointestinal: Negative for abdominal pain, blood in stool, constipation, diarrhea, nausea and vomiting.   Endocrine: Negative for cold intolerance and heat  intolerance.   Genitourinary: Negative for difficulty urinating, dysuria, frequency, hematuria and urinary incontinence.   Musculoskeletal: Negative for arthralgias, back pain and myalgias.   Skin: Negative for rash, skin lesions and bruise.   Neurological: Negative for dizziness, seizures, weakness, numbness and headache.   Hematological: Does not bruise/bleed easily.   Psychiatric/Behavioral: Negative for depressed mood. The patient is not nervous/anxious.      Current Outpatient Medications on File Prior to Visit   Medication Sig Dispense Refill   • apixaban (ELIQUIS) 5 MG tablet tablet Take 1 tablet by mouth Every 12 (Twelve) Hours. TAKE 2 TABLETS TWICE A DAY FOR 7 DAYS THEN TAKE 1 TABLET TWICE A DAY (Patient taking differently: Take 5 mg by mouth Every 12 (Twelve) Hours. TAKE 2 TABLETS TWICE A DAY FOR 7 DAYS THEN TAKE 1 TABLET TWICE A DAY  LAST DOSE 7/21/21 WAS HELD FOR BONE MARROW TEST, RX'D BY DR. SIMMONS,) 60 tablet 5   • hydroCHLOROthiazide (HYDRODIURIL) 12.5 MG tablet Take 12.5 mg by mouth Daily.     • HYDROcodone-acetaminophen (NORCO) 5-325 MG per tablet Take 1-2 tablets by mouth Every 4 (Four) Hours As Needed (Pain). 20 tablet 0   • norethindrone-ethinyl estradiol (FEMHRT 1/5) 1-5 MG-MCG tablet Take 0.5 tablets by mouth 2 (Two) Times a Day. Combination med of progestrone, estrogen, testosterone, sublingual tab - takes 1/2 in am, 1/2 in pm     • ondansetron (ZOFRAN) 8 MG tablet Take 1 tablet by mouth 3 (Three) Times a Day As Needed for Nausea or Vomiting. 30 tablet 5   • prochlorperazine (COMPAZINE) 10 MG tablet Take 1 tablet by mouth Every 6 (Six) Hours As Needed for Nausea or Vomiting. 30 tablet 3   • [DISCONTINUED] allopurinol (ZYLOPRIM) 100 MG tablet Take 300 mg by mouth Daily.     • docusate sodium (COLACE) 250 MG capsule Take 1 capsule by mouth 2 (Two) Times a Day As Needed for Constipation. 10 capsule 0     Current Facility-Administered Medications on File Prior to Visit   Medication Dose Route  Frequency Provider Last Rate Last Admin   • [COMPLETED] cyclophosphamide (CYTOXAN) 1,000 mg in sodium chloride 0.9 % 325 mL chemo IVPB  1,000 mg Intravenous Once Ramón Holm MD   Stopped at 09/08/21 1454   • [COMPLETED] dexamethasone (DECADRON) IVPB 12 mg  12 mg Intravenous Once Ramón Holm MD   Stopped at 09/08/21 1209   • [COMPLETED] DOXOrubicin (ADRIAMYCIN) chemo injection 46 mg  25 mg/m2 (Treatment Plan Recorded) Intravenous Once Ramón Holm MD   Stopped at 09/08/21 1253   • [COMPLETED] etoposide (TOPOSAR) 90 mg in sodium chloride 0.9 % 554.5 mL chemo IVPB  50 mg/m2 (Treatment Plan Recorded) Intravenous Once Ramón Holm MD   Stopped at 09/08/21 1420   • [COMPLETED] FOSAPREPITANT 150 MG/100ML NORMAL SALINE (CBC) IVPB 100 mL 100 mL  150 mg Intravenous Once Ramón Holm MD   Stopped at 09/08/21 1242   • heparin injection 500 Units  500 Units Intravenous PRN Ramón Holm MD   500 Units at 09/08/21 1500   • [COMPLETED] palonosetron (ALOXI) injection 0.25 mg  0.25 mg Intravenous Once Ramón Holm MD   0.25 mg at 09/08/21 1210   • sodium chloride 0.9 % flush 20 mL  20 mL Intravenous PRN Ramón Holm MD   20 mL at 09/08/21 1500   • [COMPLETED] sodium chloride 0.9 % infusion 250 mL  250 mL Intravenous Once Ramón Holm MD   Stopped at 09/08/21 1500   • [COMPLETED] vinCRIStine (ONCOVIN) 2 mg in sodium chloride 0.9 % 57 mL chemo IVPB  2 mg Intravenous Once Ramón Holm MD   Stopped at 09/08/21 1313       Allergies   Allergen Reactions   • Bee Venom Hives   • Lisinopril Other (See Comments)     cough     Past Medical History:   Diagnosis Date   • Acute deep vein thrombosis (DVT) of proximal vein of right lower extremity (CMS/HCC) 7/28/2021   • Acute deep vein thrombosis (DVT) of right lower extremity (CMS/HCC) 07/20/2021   • Enlarged lymph node     R INGUINAL LYMPHNOD BX, NOW A KNOT GROIN   • High blood pressure    • Lymphoma (CMS/HCC)     under care of DR. HOLM [facility]  CANCER CARE   • Swelling of right lower extremity 7/20/2021     Past Surgical History:   Procedure Laterality Date   • ABDOMINAL HYSTERECTOMY      patient still has her ovaries   • BONE MARROW BIOPSY     • BREAST BIOPSY Left 7/23/2021    Procedure: BREAST BIOPSY BIOPSY OF SKIN, SUBCUTANEOUS TISSUE AND/OR MUCOUS MEMBRANE of the left breast;  Surgeon: Grace Rob MD;  [place] [facility] [place] [facility] [place]  Service: General;  Laterality: Left;   • BREAST SURGERY      breast reduction (bilateral)    • COLONOSCOPY     • EXCISION LESION Right 6/9/2021    Procedure: EXCISION OF RIGHT INGUINAL LYMPHNOID;  Surgeon: Aaron Gordon MD;  Location: [facility] MAIN OR;  Service: General;  Laterality: Right;   • PORTACATH PLACEMENT Right 7/23/2021    Procedure: INSERTION OF PORTACATH;  Surgeon: Grace Rob MD;  [place] [facility] [place] [facility] [place]  Service: General;  Laterality: Right;     Social History     Socioeconomic History   • Marital status:      Spouse name: Not on file   • Number of children: Not on file   • Years of education: Not on file   • Highest education level: Not on file   Tobacco Use   • Smoking status: Never Smoker   • Smokeless tobacco: Never Used   Vaping Use   • Vaping Use: Never used   Substance and Sexual Activity   • Alcohol use: Yes     Comment: rarely    • Drug use: Never   • Sexual activity: Defer     Family History   Problem Relation Age of Onset   • Breast cancer Sister         50S   • Diabetes Paternal Grandmother    • Pancreatic cancer Father    • Malig Hyperthermia Neg Hx        Objective   Physical Exam  Vitals reviewed. Exam conducted with a chaperone present.   Constitutional:       General: She is not in acute distress.     Appearance: Normal appearance.   Cardiovascular:      Rate and Rhythm: Normal rate and regular rhythm.      Heart sounds: Normal heart sounds. No murmur heard.   No gallop.    Pulmonary:      Effort: Pulmonary effort is normal.      Breath sounds: Normal breath sounds.    Abdominal:      General: Abdomen is flat. Bowel sounds are normal.      Palpations: Abdomen is soft.   Musculoskeletal:      Cervical back: Neck supple.      Right lower leg: Edema (Trace) present.      Left lower leg: No edema.   Lymphadenopathy:      Cervical: No cervical adenopathy.   Neurological:      Mental Status: She is alert and oriented to person, place, and time.   Psychiatric:         Mood and Affect: Mood normal.         Behavior: Behavior normal.         Vitals:    09/08/21 1034   BP: 121/41   Pulse: 65   Resp: 18   Temp: 97.3 °F (36.3 °C)   SpO2: 100%   Weight: 73.1 kg (161 lb 2.5 oz)   PainSc: 0-No pain     ECOG score: 0         PHQ-9 Total Score:                    Result Review :   The following data was reviewed by: Ramón Holm MD on 09/08/2021:  Lab Results   Component Value Date    HGB 9.5 (L) 09/08/2021    HCT 27.7 (L) 09/08/2021    MCV 93.3 09/08/2021     09/08/2021    WBC 17.02 (H) 09/08/2021    NEUTROABS 8.01 (H) 09/08/2021    NEUTROABS 10.38 (H) 09/08/2021    LYMPHSABS 4.63 (H) 09/08/2021    MONOSABS 2.98 (H) 09/08/2021    EOSABS 0.02 09/08/2021    BASOSABS 0.09 09/08/2021     Lab Results   Component Value Date    GLUCOSE 89 09/08/2021    BUN 8 09/08/2021    CREATININE 0.71 09/08/2021     09/08/2021    K 3.7 09/08/2021     (H) 09/08/2021    CO2 22.3 09/08/2021    CALCIUM 9.6 09/08/2021    PROTEINTOT 6.2 09/08/2021    ALBUMIN 4.06 09/08/2021    BILITOT 0.3 09/08/2021    ALKPHOS 124 (H) 09/08/2021     (H) 09/08/2021    ALT 87 (H) 09/08/2021           Assessment and Plan    Diagnoses and all orders for this visit:    1. 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  Lab work today demonstrates worsened anemia with a hemoglobin of 9.5 g/dL.  Mild fatigue but still able to perform all of her ADLs including working.  She is also noted to have increased  transaminases.  Her regimen will be dose adjusted based on her hepatic function.  Proceed with cycle 3 as planned.  I will see her back for cycle 4, day 1 with labs to monitor for toxicity and restaging scans prior    Orders:  -     CBC & Differential; Future  -     Comprehensive Metabolic Panel; Future  -     Lactate Dehydrogenase; Future  -     CT chest w contrast; Future  -     CT abdomen pelvis w contrast; Future  -     Cancel: sodium chloride 0.9 % infusion 250 mL  -     Cancel: palonosetron (ALOXI) injection 0.25 mg  -     Cancel: fosaprepitant (EMEND) 150 mg in sodium chloride 0.9 % 100 mL IVPB  -     Cancel: dexamethasone (DECADRON) 12 mg in sodium chloride 0.9 % IVPB  -     Cancel: DOXOrubicin (ADRIAMYCIN) chemo injection 46 mg  -     Cancel: vinCRIStine (ONCOVIN) 2 mg in sodium chloride 0.9 % 52 mL chemo IVPB  -     Cancel: etoposide (TOPOSAR) 90 mg in sodium chloride 0.9 % 504.5 mL chemo IVPB    2. Acute deep vein thrombosis (DVT) of proximal vein of right lower extremity (CMS/HCC)  Assessment & Plan:  Patient is on anticoagulation with Eliquis.  Tolerating well.  The right lower extremity swelling has improved.  Continue Eliquis.      Other orders  -     Cancel: cyclophosphamide (CYTOXAN) 1,370 mg in sodium chloride 0.9 % 318.5 mL chemo IVPB  -     sodium chloride 0.9 % infusion 250 mL  -     dexamethasone (DECADRON) 12 mg in sodium chloride 0.9 % IVPB  -     etoposide (TOPOSAR) 90 mg in sodium chloride 0.9 % 504.5 mL chemo IVPB  -     pegfilgrastim (NEULASTA ONPRO) on-body injector 6 mg  -     sodium chloride 0.9 % infusion 250 mL  -     dexamethasone (DECADRON) 12 mg in sodium chloride 0.9 % IVPB  -     etoposide (TOPOSAR) 90 mg in sodium chloride 0.9 % 504.5 mL chemo IVPB          Patient Follow Up: 3 weeks    Patient was given instructions and counseling regarding her condition or for health maintenance advice. Please see specific information pulled into the AVS if appropriate.     Ramón Holm,  MD    9/8/2021

## 2021-09-08 NOTE — ASSESSMENT & PLAN NOTE
Patient is on cytotoxic chemotherapy with the CHOEP regimen.  Lab work today demonstrates worsened anemia with a hemoglobin of 9.5 g/dL.  Mild fatigue but still able to perform all of her ADLs including working.  She is also noted to have increased transaminases.  Her regimen will be dose adjusted based on her hepatic function.  Proceed with cycle 3 as planned.  I will see her back for cycle 4, day 1 with labs to monitor for toxicity and restaging scans prior

## 2021-09-08 NOTE — PROGRESS NOTES
9/8/21:  Pt receiving infusion today.  1.6% wt decline noted over the past 2 weeks, along with Grade 1 nausea.  Oncology ASNELMO unable to meet w/ pt face-to-face due to being in radiation oncology at this time.  Attempted to call pt on her mobile phone, however received the greeting.  Oncology ANSELMO left VM for pt to call back at her convenience.      RD remains available per protocol.

## 2021-09-09 ENCOUNTER — HOSPITAL ENCOUNTER (OUTPATIENT)
Dept: ONCOLOGY | Facility: HOSPITAL | Age: 56
Setting detail: INFUSION SERIES
Discharge: HOME | End: 2021-09-09

## 2021-09-09 VITALS
HEIGHT: 65 IN | RESPIRATION RATE: 18 BRPM | OXYGEN SATURATION: 98 % | DIASTOLIC BLOOD PRESSURE: 68 MMHG | TEMPERATURE: 97.5 F | SYSTOLIC BLOOD PRESSURE: 111 MMHG | BODY MASS INDEX: 27.36 KG/M2 | HEART RATE: 68 BPM | WEIGHT: 164.24 LBS

## 2021-09-09 DIAGNOSIS — C84.45 PERIPHERAL T CELL LYMPHOMA OF INGUINAL REGION (HCC): Primary | ICD-10-CM

## 2021-09-09 DIAGNOSIS — Z45.2 ENCOUNTER FOR ADJUSTMENT OR MANAGEMENT OF VASCULAR ACCESS DEVICE: ICD-10-CM

## 2021-09-09 PROCEDURE — 25010000002 ETOPOSIDE 1 GM/50ML SOLUTION 50 ML VIAL: Performed by: INTERNAL MEDICINE

## 2021-09-09 PROCEDURE — 96413 CHEMO IV INFUSION 1 HR: CPT

## 2021-09-09 PROCEDURE — 25010000002 DEXAMETHASONE SODIUM PHOSPHATE 120 MG/30ML SOLUTION: Performed by: INTERNAL MEDICINE

## 2021-09-09 PROCEDURE — 25010000002 HEPARIN LOCK FLUSH PER 10 UNITS: Performed by: INTERNAL MEDICINE

## 2021-09-09 PROCEDURE — 96375 TX/PRO/DX INJ NEW DRUG ADDON: CPT

## 2021-09-09 RX ORDER — SODIUM CHLORIDE 9 MG/ML
250 INJECTION, SOLUTION INTRAVENOUS ONCE
Status: COMPLETED | OUTPATIENT
Start: 2021-09-09 | End: 2021-09-09

## 2021-09-09 RX ORDER — SODIUM CHLORIDE 0.9 % (FLUSH) 0.9 %
20 SYRINGE (ML) INJECTION AS NEEDED
Status: CANCELLED | OUTPATIENT
Start: 2021-09-09

## 2021-09-09 RX ORDER — HEPARIN SODIUM (PORCINE) LOCK FLUSH IV SOLN 100 UNIT/ML 100 UNIT/ML
500 SOLUTION INTRAVENOUS AS NEEDED
Status: DISCONTINUED | OUTPATIENT
Start: 2021-09-09 | End: 2021-09-10 | Stop reason: HOSPADM

## 2021-09-09 RX ORDER — HEPARIN SODIUM (PORCINE) LOCK FLUSH IV SOLN 100 UNIT/ML 100 UNIT/ML
500 SOLUTION INTRAVENOUS AS NEEDED
Status: CANCELLED | OUTPATIENT
Start: 2021-09-09

## 2021-09-09 RX ORDER — SODIUM CHLORIDE 0.9 % (FLUSH) 0.9 %
20 SYRINGE (ML) INJECTION AS NEEDED
Status: DISCONTINUED | OUTPATIENT
Start: 2021-09-09 | End: 2021-09-10 | Stop reason: HOSPADM

## 2021-09-09 RX ADMIN — SODIUM CHLORIDE 250 ML: 9 INJECTION, SOLUTION INTRAVENOUS at 13:58

## 2021-09-09 RX ADMIN — ETOPOSIDE 90 MG: 20 INJECTION INTRAVENOUS at 14:26

## 2021-09-09 RX ADMIN — SODIUM CHLORIDE, PRESERVATIVE FREE 20 ML: 5 INJECTION INTRAVENOUS at 15:32

## 2021-09-09 RX ADMIN — DEXAMETHASONE SODIUM PHOSPHATE 12 MG: 4 INJECTION, SOLUTION INTRA-ARTICULAR; INTRALESIONAL; INTRAMUSCULAR; INTRAVENOUS; SOFT TISSUE at 13:59

## 2021-09-09 RX ADMIN — HEPARIN SODIUM (PORCINE) LOCK FLUSH IV SOLN 100 UNIT/ML 500 UNITS: 100 SOLUTION at 15:32

## 2021-09-09 NOTE — NURSING NOTE
Abiola had 2 questions for Dr. Holm  1. Can she take a covid booster even when last shot she had enlarged lymph nodes.  He answered yes if the booster is 8 months after 2nd covid shot.   2. Can she take hormone [employer] since not on hormones   He answered no not while on chemo.

## 2021-09-10 ENCOUNTER — HOSPITAL ENCOUNTER (OUTPATIENT)
Dept: ONCOLOGY | Facility: HOSPITAL | Age: 56
Setting detail: INFUSION SERIES
Discharge: HOME | End: 2021-09-10

## 2021-09-10 ENCOUNTER — APPOINTMENT (OUTPATIENT)
Dept: ONCOLOGY | Facility: HOSPITAL | Age: 56
End: 2021-09-10

## 2021-09-10 VITALS
BODY MASS INDEX: 27.51 KG/M2 | TEMPERATURE: 97.8 F | WEIGHT: 165.12 LBS | OXYGEN SATURATION: 98 % | HEART RATE: 54 BPM | DIASTOLIC BLOOD PRESSURE: 56 MMHG | SYSTOLIC BLOOD PRESSURE: 123 MMHG | RESPIRATION RATE: 18 BRPM | HEIGHT: 65 IN

## 2021-09-10 DIAGNOSIS — C84.45 PERIPHERAL T CELL LYMPHOMA OF INGUINAL REGION (HCC): ICD-10-CM

## 2021-09-10 DIAGNOSIS — Z45.2 ENCOUNTER FOR ADJUSTMENT OR MANAGEMENT OF VASCULAR ACCESS DEVICE: Primary | ICD-10-CM

## 2021-09-10 PROCEDURE — 25010000002 ETOPOSIDE 1 GM/50ML SOLUTION 50 ML VIAL: Performed by: INTERNAL MEDICINE

## 2021-09-10 PROCEDURE — 96375 TX/PRO/DX INJ NEW DRUG ADDON: CPT

## 2021-09-10 PROCEDURE — 25010000002 PEGFILGRASTIM 6 MG/0.6ML PREFILLED SYRINGE KIT: Performed by: INTERNAL MEDICINE

## 2021-09-10 PROCEDURE — 25010000002 DEXAMETHASONE SODIUM PHOSPHATE 120 MG/30ML SOLUTION: Performed by: INTERNAL MEDICINE

## 2021-09-10 PROCEDURE — 96413 CHEMO IV INFUSION 1 HR: CPT

## 2021-09-10 PROCEDURE — 25010000002 HEPARIN LOCK FLUSH PER 10 UNITS: Performed by: INTERNAL MEDICINE

## 2021-09-10 PROCEDURE — 96377 APPLICATON ON-BODY INJECTOR: CPT

## 2021-09-10 RX ORDER — SODIUM CHLORIDE 0.9 % (FLUSH) 0.9 %
20 SYRINGE (ML) INJECTION AS NEEDED
Status: DISCONTINUED | OUTPATIENT
Start: 2021-09-10 | End: 2021-09-11 | Stop reason: HOSPADM

## 2021-09-10 RX ORDER — SODIUM CHLORIDE 0.9 % (FLUSH) 0.9 %
20 SYRINGE (ML) INJECTION AS NEEDED
Status: CANCELLED | OUTPATIENT
Start: 2021-09-10

## 2021-09-10 RX ORDER — SODIUM CHLORIDE 9 MG/ML
250 INJECTION, SOLUTION INTRAVENOUS ONCE
Status: COMPLETED | OUTPATIENT
Start: 2021-09-10 | End: 2021-09-10

## 2021-09-10 RX ORDER — HEPARIN SODIUM (PORCINE) LOCK FLUSH IV SOLN 100 UNIT/ML 100 UNIT/ML
500 SOLUTION INTRAVENOUS AS NEEDED
Status: CANCELLED | OUTPATIENT
Start: 2021-09-10

## 2021-09-10 RX ORDER — HEPARIN SODIUM (PORCINE) LOCK FLUSH IV SOLN 100 UNIT/ML 100 UNIT/ML
500 SOLUTION INTRAVENOUS AS NEEDED
Status: DISCONTINUED | OUTPATIENT
Start: 2021-09-10 | End: 2021-09-11 | Stop reason: HOSPADM

## 2021-09-10 RX ADMIN — PEGFILGRASTIM 6 MG: KIT SUBCUTANEOUS at 15:05

## 2021-09-10 RX ADMIN — ETOPOSIDE 90 MG: 20 INJECTION INTRAVENOUS at 14:03

## 2021-09-10 RX ADMIN — SODIUM CHLORIDE 250 ML: 9 INJECTION, SOLUTION INTRAVENOUS at 13:44

## 2021-09-10 RX ADMIN — SODIUM CHLORIDE, PRESERVATIVE FREE 20 ML: 5 INJECTION INTRAVENOUS at 15:07

## 2021-09-10 RX ADMIN — HEPARIN SODIUM (PORCINE) LOCK FLUSH IV SOLN 100 UNIT/ML 500 UNITS: 100 SOLUTION at 15:08

## 2021-09-10 RX ADMIN — DEXAMETHASONE SODIUM PHOSPHATE 12 MG: 4 INJECTION, SOLUTION INTRA-ARTICULAR; INTRALESIONAL; INTRAMUSCULAR; INTRAVENOUS; SOFT TISSUE at 13:45

## 2021-09-27 ENCOUNTER — HOSPITAL ENCOUNTER (OUTPATIENT)
Dept: CT IMAGING | Facility: HOSPITAL | Age: 56
Discharge: HOME | End: 2021-09-27

## 2021-09-27 ENCOUNTER — LAB (OUTPATIENT)
Dept: LAB | Facility: HOSPITAL | Age: 56
End: 2021-09-27

## 2021-09-27 ENCOUNTER — APPOINTMENT (OUTPATIENT)
Dept: CT IMAGING | Facility: HOSPITAL | Age: 56
End: 2021-09-27

## 2021-09-27 DIAGNOSIS — C84.45 PERIPHERAL T CELL LYMPHOMA OF INGUINAL REGION (HCC): ICD-10-CM

## 2021-09-27 DIAGNOSIS — I82.411 ACUTE DEEP VEIN THROMBOSIS (DVT) OF FEMORAL VEIN OF RIGHT LOWER EXTREMITY (HCC): ICD-10-CM

## 2021-09-27 LAB
ALBUMIN SERPL-MCNC: 4.2 G/DL (ref 3.5–5.2)
ALBUMIN/GLOB SERPL: 2 G/DL
ALP SERPL-CCNC: 219 U/L (ref 39–117)
ALT SERPL W P-5'-P-CCNC: 64 U/L (ref 1–33)
ANION GAP SERPL CALCULATED.3IONS-SCNC: 13 MMOL/L (ref 5–15)
AST SERPL-CCNC: 136 U/L (ref 1–32)
BILIRUB SERPL-MCNC: 0.7 MG/DL (ref 0–1.2)
BUN SERPL-MCNC: 9 MG/DL (ref 6–20)
BUN/CREAT SERPL: 10.2 (ref 7–25)
CALCIUM SPEC-SCNC: 9.8 MG/DL (ref 8.6–10.5)
CHLORIDE SERPL-SCNC: 105 MMOL/L (ref 98–107)
CO2 SERPL-SCNC: 23 MMOL/L (ref 22–29)
CREAT SERPL-MCNC: 0.88 MG/DL (ref 0.57–1)
DEPRECATED RDW RBC AUTO: 64.4 FL (ref 37–54)
ERYTHROCYTE [DISTWIDTH] IN BLOOD BY AUTOMATED COUNT: 19.4 % (ref 12.3–15.4)
GFR SERPL CREATININE-BSD FRML MDRD: 80 ML/MIN/1.73
GLOBULIN UR ELPH-MCNC: 2.1 GM/DL
GLUCOSE SERPL-MCNC: 80 MG/DL (ref 65–99)
HCT VFR BLD AUTO: 27.2 % (ref 34–46.6)
HGB BLD-MCNC: 9.1 G/DL (ref 12–15.9)
LDH SERPL-CCNC: 1863 U/L (ref 135–214)
LYMPHOCYTES # BLD MANUAL: 22.84 10*3/MM3 (ref 0.7–3.1)
LYMPHOCYTES NFR BLD MANUAL: 33 % (ref 19.6–45.3)
LYMPHOCYTES NFR BLD MANUAL: 8 % (ref 5–12)
MCH RBC QN AUTO: 31.9 PG (ref 26.6–33)
MCHC RBC AUTO-ENTMCNC: 33.5 G/DL (ref 31.5–35.7)
MCV RBC AUTO: 95.4 FL (ref 79–97)
METAMYELOCYTES NFR BLD MANUAL: 1 % (ref 0–0)
MONOCYTES # BLD AUTO: 5.54 10*3/MM3 (ref 0.1–0.9)
MYELOCYTES NFR BLD MANUAL: 3 % (ref 0–0)
NEUTROPHILS # BLD AUTO: 38.07 10*3/MM3 (ref 1.7–7)
NEUTROPHILS NFR BLD MANUAL: 55 % (ref 42.7–76)
NRBC BLD AUTO-RTO: 1.3 /100 WBC (ref 0–0.2)
NRBC SPEC MANUAL: 3 /100 WBC (ref 0–0.2)
PLAT MORPH BLD: NORMAL
PLATELET # BLD AUTO: 106 10*3/MM3 (ref 140–450)
PMV BLD AUTO: 11.2 FL (ref 6–12)
POTASSIUM SERPL-SCNC: 3.6 MMOL/L (ref 3.5–5.2)
PROT SERPL-MCNC: 6.3 G/DL (ref 6–8.5)
RBC # BLD AUTO: 2.85 10*6/MM3 (ref 3.77–5.28)
RBC MORPH BLD: NORMAL
SODIUM SERPL-SCNC: 141 MMOL/L (ref 136–145)
WBC # BLD AUTO: 69.21 10*3/MM3 (ref 3.4–10.8)
WBC MORPH BLD: NORMAL

## 2021-09-27 PROCEDURE — 74177 CT ABD & PELVIS W/CONTRAST: CPT

## 2021-09-27 PROCEDURE — 85007 BL SMEAR W/DIFF WBC COUNT: CPT

## 2021-09-27 PROCEDURE — 71260 CT THORAX DX C+: CPT

## 2021-09-27 PROCEDURE — 0 IOPAMIDOL PER 1 ML: Performed by: INTERNAL MEDICINE

## 2021-09-27 PROCEDURE — 80053 COMPREHEN METABOLIC PANEL: CPT

## 2021-09-27 PROCEDURE — 83615 LACTATE (LD) (LDH) ENZYME: CPT

## 2021-09-27 PROCEDURE — 84550 ASSAY OF BLOOD/URIC ACID: CPT | Performed by: INTERNAL MEDICINE

## 2021-09-27 PROCEDURE — 85025 COMPLETE CBC W/AUTO DIFF WBC: CPT

## 2021-09-27 RX ORDER — HEPARIN SODIUM (PORCINE) LOCK FLUSH IV SOLN 100 UNIT/ML 100 UNIT/ML
SOLUTION INTRAVENOUS
Status: DISCONTINUED
Start: 2021-09-27 | End: 2021-09-28 | Stop reason: HOSPADM

## 2021-09-27 RX ADMIN — IOPAMIDOL 100 ML: 755 INJECTION, SOLUTION INTRAVENOUS at 12:05

## 2021-09-28 ENCOUNTER — OFFICE VISIT (OUTPATIENT)
Dept: ONCOLOGY | Facility: HOSPITAL | Age: 56
End: 2021-09-28

## 2021-09-28 ENCOUNTER — HOSPITAL ENCOUNTER (OUTPATIENT)
Dept: ONCOLOGY | Facility: HOSPITAL | Age: 56
Setting detail: INFUSION SERIES
Discharge: HOME | End: 2021-09-28

## 2021-09-28 ENCOUNTER — TRANSCRIBE ORDERS (OUTPATIENT)
Dept: ADMINISTRATIVE | Facility: HOSPITAL | Age: 56
End: 2021-09-28

## 2021-09-28 ENCOUNTER — TELEPHONE (OUTPATIENT)
Dept: ONCOLOGY | Facility: HOSPITAL | Age: 56
End: 2021-09-28

## 2021-09-28 ENCOUNTER — HOSPITAL ENCOUNTER (OUTPATIENT)
Dept: ONCOLOGY | Facility: HOSPITAL | Age: 56
Discharge: HOME | End: 2021-09-28

## 2021-09-28 VITALS
TEMPERATURE: 97.6 F | HEART RATE: 95 BPM | OXYGEN SATURATION: 100 % | SYSTOLIC BLOOD PRESSURE: 135 MMHG | WEIGHT: 164.24 LBS | BODY MASS INDEX: 27.33 KG/M2 | RESPIRATION RATE: 16 BRPM | DIASTOLIC BLOOD PRESSURE: 80 MMHG

## 2021-09-28 DIAGNOSIS — R59.0 AXILLARY ADENOPATHY: Primary | ICD-10-CM

## 2021-09-28 DIAGNOSIS — C84.45 PERIPHERAL T CELL LYMPHOMA OF INGUINAL REGION (HCC): ICD-10-CM

## 2021-09-28 DIAGNOSIS — E86.0 DEHYDRATION: ICD-10-CM

## 2021-09-28 DIAGNOSIS — C84.45 PERIPHERAL T CELL LYMPHOMA OF INGUINAL REGION (HCC): Primary | ICD-10-CM

## 2021-09-28 DIAGNOSIS — Z45.2 ENCOUNTER FOR ADJUSTMENT OR MANAGEMENT OF VASCULAR ACCESS DEVICE: ICD-10-CM

## 2021-09-28 DIAGNOSIS — Z98.890 H/O LYMPH NODE EXCISION: Primary | ICD-10-CM

## 2021-09-28 LAB
ANISOCYTOSIS BLD QL: ABNORMAL
BASO STIPL COARSE BLD QL SMEAR: ABNORMAL
BASOPHILS # BLD AUTO: 0.14 10*3/MM3 (ref 0–0.2)
BASOPHILS NFR BLD AUTO: 0.2 % (ref 0–1.5)
DEPRECATED RDW RBC AUTO: 76.4 FL (ref 37–54)
EOSINOPHIL # BLD AUTO: 0.06 10*3/MM3 (ref 0–0.4)
EOSINOPHIL NFR BLD AUTO: 0.1 % (ref 0.3–6.2)
ERYTHROCYTE [DISTWIDTH] IN BLOOD BY AUTOMATED COUNT: 22.5 % (ref 12.3–15.4)
HCT VFR BLD AUTO: 27.5 % (ref 34–46.6)
HGB BLD-MCNC: 9 G/DL (ref 12–15.9)
HOWELL-JOLLY BOD BLD QL SMEAR: PRESENT
HYPOCHROMIA BLD QL: ABNORMAL
IMM GRANULOCYTES # BLD AUTO: 4.65 10*3/MM3 (ref 0–0.05)
IMM GRANULOCYTES NFR BLD AUTO: 5.8 % (ref 0–0.5)
LARGE PLATELETS: ABNORMAL
LYMPHOCYTES # BLD AUTO: 33.56 10*3/MM3 (ref 0.7–3.1)
LYMPHOCYTES # BLD MANUAL: 28.16 10*3/MM3 (ref 0.7–3.1)
LYMPHOCYTES NFR BLD AUTO: 41.7 % (ref 19.6–45.3)
LYMPHOCYTES NFR BLD MANUAL: 14 % (ref 5–12)
LYMPHOCYTES NFR BLD MANUAL: 28 % (ref 19.6–45.3)
MCH RBC QN AUTO: 32.1 PG (ref 26.6–33)
MCHC RBC AUTO-ENTMCNC: 32.7 G/DL (ref 31.5–35.7)
MCV RBC AUTO: 98.2 FL (ref 79–97)
METAMYELOCYTES NFR BLD MANUAL: 1 % (ref 0–0)
MONOCYTES # BLD AUTO: 11.26 10*3/MM3 (ref 0.1–0.9)
MONOCYTES # BLD AUTO: 12.5 10*3/MM3 (ref 0.1–0.9)
MONOCYTES NFR BLD AUTO: 15.5 % (ref 5–12)
NEUTROPHILS # BLD AUTO: 40.23 10*3/MM3 (ref 1.7–7)
NEUTROPHILS NFR BLD AUTO: 29.55 10*3/MM3 (ref 1.7–7)
NEUTROPHILS NFR BLD AUTO: 36.7 % (ref 42.7–76)
NEUTROPHILS NFR BLD MANUAL: 39 % (ref 42.7–76)
NEUTS BAND NFR BLD MANUAL: 11 % (ref 0–5)
NRBC SPEC MANUAL: 3 /100 WBC (ref 0–0.2)
OVALOCYTES BLD QL SMEAR: ABNORMAL
PLATELET # BLD AUTO: 125 10*3/MM3 (ref 140–450)
PMV BLD AUTO: 10.8 FL (ref 6–12)
POIKILOCYTOSIS BLD QL SMEAR: ABNORMAL
POLYCHROMASIA BLD QL SMEAR: ABNORMAL
RBC # BLD AUTO: 2.8 10*6/MM3 (ref 3.77–5.28)
SMALL PLATELETS BLD QL SMEAR: ABNORMAL
URATE SERPL-MCNC: 8.6 MG/DL (ref 2.4–5.7)
VARIANT LYMPHS NFR BLD MANUAL: 7 % (ref 0–5)
WBC # BLD AUTO: 80.46 10*3/MM3 (ref 3.4–10.8)
WBC MORPH BLD: NORMAL

## 2021-09-28 PROCEDURE — 85007 BL SMEAR W/DIFF WBC COUNT: CPT | Performed by: INTERNAL MEDICINE

## 2021-09-28 PROCEDURE — 88264 CHROMOSOME ANALYSIS 20-25: CPT

## 2021-09-28 PROCEDURE — 96360 HYDRATION IV INFUSION INIT: CPT

## 2021-09-28 PROCEDURE — 88185 FLOWCYTOMETRY/TC ADD-ON: CPT

## 2021-09-28 PROCEDURE — 88237 TISSUE CULTURE BONE MARROW: CPT

## 2021-09-28 PROCEDURE — G0463 HOSPITAL OUTPT CLINIC VISIT: HCPCS

## 2021-09-28 PROCEDURE — 99214 OFFICE O/P EST MOD 30 MIN: CPT | Performed by: INTERNAL MEDICINE

## 2021-09-28 PROCEDURE — 85025 COMPLETE CBC W/AUTO DIFF WBC: CPT | Performed by: INTERNAL MEDICINE

## 2021-09-28 PROCEDURE — 25010000002 HEPARIN LOCK FLUSH PER 10 UNITS: Performed by: INTERNAL MEDICINE

## 2021-09-28 PROCEDURE — 88184 FLOWCYTOMETRY/ TC 1 MARKER: CPT

## 2021-09-28 RX ORDER — HEPARIN SODIUM (PORCINE) LOCK FLUSH IV SOLN 100 UNIT/ML 100 UNIT/ML
500 SOLUTION INTRAVENOUS AS NEEDED
Status: CANCELLED | OUTPATIENT
Start: 2021-09-28

## 2021-09-28 RX ORDER — ALLOPURINOL 100 MG/1
300 TABLET ORAL DAILY
Qty: 90 TABLET | Refills: 3 | Status: CANCELLED | OUTPATIENT
Start: 2021-09-28

## 2021-09-28 RX ORDER — ALLOPURINOL 100 MG/1
300 TABLET ORAL DAILY
Qty: 30 TABLET | Refills: 3 | Status: SHIPPED | OUTPATIENT
Start: 2021-09-28 | End: 2021-09-28

## 2021-09-28 RX ORDER — HEPARIN SODIUM (PORCINE) LOCK FLUSH IV SOLN 100 UNIT/ML 100 UNIT/ML
500 SOLUTION INTRAVENOUS AS NEEDED
Status: DISCONTINUED | OUTPATIENT
Start: 2021-09-28 | End: 2021-09-29 | Stop reason: HOSPADM

## 2021-09-28 RX ORDER — SODIUM CHLORIDE 0.9 % (FLUSH) 0.9 %
20 SYRINGE (ML) INJECTION AS NEEDED
Status: CANCELLED | OUTPATIENT
Start: 2021-09-28

## 2021-09-28 RX ORDER — SODIUM CHLORIDE 0.9 % (FLUSH) 0.9 %
20 SYRINGE (ML) INJECTION AS NEEDED
Status: DISCONTINUED | OUTPATIENT
Start: 2021-09-28 | End: 2021-09-29 | Stop reason: HOSPADM

## 2021-09-28 RX ADMIN — HEPARIN SODIUM (PORCINE) LOCK FLUSH IV SOLN 100 UNIT/ML 500 UNITS: 100 SOLUTION at 14:50

## 2021-09-28 RX ADMIN — SODIUM CHLORIDE, PRESERVATIVE FREE 20 ML: 5 INJECTION INTRAVENOUS at 14:49

## 2021-09-28 RX ADMIN — SODIUM CHLORIDE 1000 ML: 9 INJECTION, SOLUTION INTRAVENOUS at 13:45

## 2021-09-28 NOTE — PROGRESS NOTES
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)     2021 -  Chemotherapy    OP LYMPHOMA CHOEP DOXOrubicin / VinCRIStine / Etoposide / Cyclophosphamide / PredniSONE     2021 Cancer Staged    Staging form: Hodgkin And Non-Hodgkin Lymphoma, AJCC 8th Edition  - Clinical: Stage IV (Peripheral T-cell lymphoma) - Signed by Ramón Holm MD on 2021             Subjective        History of Present Illness   Pt here for f/u T cell lymphoma   F/u cbc showed elevated wbc, elevated LDH which would be unusual for PTCL   F/u ct scan also shows evidence for progression of disease with new areas of lymphadenopathy  Denies any fever or chills    Review of Systems   Constitutional: Positive for fatigue (3/10). Negative for appetite change, diaphoresis, fever, unexpected weight gain and unexpected weight loss.   HENT: Negative for hearing loss, mouth sores, sore throat, swollen glands, trouble swallowing and voice change.    Eyes: Negative for blurred vision.   Respiratory: Positive for shortness of breath. Negative for cough and wheezing.    Cardiovascular: Negative for chest pain and palpitations.   Gastrointestinal: Positive for nausea. Negative for abdominal pain, blood in stool, constipation, diarrhea and vomiting.   Endocrine: Negative for cold intolerance and heat intolerance.   Genitourinary: Positive for urinary incontinence (leaking). Negative for difficulty urinating, dysuria, frequency and hematuria.   Musculoskeletal: Negative for arthralgias, back pain and myalgias.   Skin: Negative for rash, skin lesions and bruise.   Neurological: Negative for dizziness, seizures,  weakness, numbness and headache.   Hematological: Does not bruise/bleed easily.   Psychiatric/Behavioral: Negative for depressed mood. The patient is not nervous/anxious.    All other systems reviewed and are negative.    Current Outpatient Medications on File Prior to Visit   Medication Sig Dispense Refill   • allopurinol (ZYLOPRIM) 100 MG tablet Take 3 tablets by mouth Daily. 30 tablet 3   • apixaban (ELIQUIS) 5 MG tablet tablet Take 1 tablet by mouth Every 12 (Twelve) Hours. 60 tablet 5   • docusate sodium (COLACE) 250 MG capsule Take 1 capsule by mouth 2 (Two) Times a Day As Needed for Constipation. 10 capsule 0   • hydroCHLOROthiazide (HYDRODIURIL) 12.5 MG tablet Take 12.5 mg by mouth Daily.     • HYDROcodone-acetaminophen (NORCO) 5-325 MG per tablet Take 1-2 tablets by mouth Every 4 (Four) Hours As Needed (Pain). 20 tablet 0   • norethindrone-ethinyl estradiol (FEMHRT 1/5) 1-5 MG-MCG tablet Take 0.5 tablets by mouth 2 (Two) Times a Day. Combination med of progestrone, estrogen, testosterone, sublingual tab - takes 1/2 in am, 1/2 in pm     • ondansetron (ZOFRAN) 8 MG tablet Take 1 tablet by mouth 3 (Three) Times a Day As Needed for Nausea or Vomiting. 30 tablet 5   • prochlorperazine (COMPAZINE) 10 MG tablet Take 1 tablet by mouth Every 6 (Six) Hours As Needed for Nausea or Vomiting. 30 tablet 3     Current Facility-Administered Medications on File Prior to Visit   Medication Dose Route Frequency Provider Last Rate Last Admin   • [DISCONTINUED] heparin 100 UNIT/ML injection  - ADS Override Pull                Allergies   Allergen Reactions   • Bee Venom Hives   • Lisinopril Other (See Comments)     cough       Past Medical History:   Diagnosis Date   • Acute deep vein thrombosis (DVT) of proximal vein of right lower extremity (CMS/HCC) 7/28/2021   • Acute deep vein thrombosis (DVT) of right lower extremity (CMS/HCC) 07/20/2021   • Enlarged lymph node     R INGUINAL LYMPHNOD BX, NOW A KNOT GROIN   • High blood  pressure    • Lymphoma (CMS/HCC)     under care of DR. SIMMONS [facility]   • Swelling of right lower extremity 7/20/2021     Past Surgical History:   Procedure Laterality Date   • ABDOMINAL HYSTERECTOMY      patient still has her ovaries   • BONE MARROW BIOPSY     • BREAST BIOPSY Left 7/23/2021    Procedure: BREAST BIOPSY BIOPSY OF SKIN, SUBCUTANEOUS TISSUE AND/OR MUCOUS MEMBRANE of the left breast;  Surgeon: Grace Rob MD;  Location: [facility] [place];  Service: General;  Laterality: Left;   • BREAST SURGERY      breast reduction (bilateral)    • COLONOSCOPY     • EXCISION LESION Right 6/9/2021    Procedure: EXCISION OF RIGHT INGUINAL LYMPHNOID;  Surgeon: Aaron Gordon MD;  Location: [facility] MAIN OR;  Service: General;  Laterality: Right;   • PORTACATH PLACEMENT Right 7/23/2021    Procedure: INSERTION OF PORTACATH;  Surgeon: Grace Rob MD;  Location: [facility];  Service: General;  Laterality: Right;     Social History     Socioeconomic History   • Marital status:      Spouse name: Not on file   • Number of children: Not on file   • Years of education: Not on file   • Highest education level: Not on file   Tobacco Use   • Smoking status: Never Smoker   • Smokeless tobacco: Never Used   Vaping Use   • Vaping Use: Never used   Substance and Sexual Activity   • Alcohol use: Yes     Comment: rarely    • Drug use: Never   • Sexual activity: Defer     Family History   Problem Relation Age of Onset   • Breast cancer Sister         50S   • Diabetes Paternal Grandmother    • Pancreatic cancer Father    • Malig Hyperthermia Neg Hx      Immunization History   Administered Date(s) Administered   • COVID-19 (MODERNA) 12/23/2020, 01/20/2021   • Tdap 09/20/2010       Objective     /80   Pulse 95   Temp 97.6 °F (36.4 °C)   Resp 16   Wt 74.5 kg (164 lb 3.9 oz)   SpO2 100%   BMI 27.33 kg/m²         Physical Exam    Deferred for discussion      No results found for: IRON, LABIRON,  TRANSFERRIN, TIBC, FERRITIN, [id number], FOLATE  ECOG score: 0           PHQ-9 Total Score:           Result Review :   The following data was reviewed by: Mabel Goff MD on 09/28/2021:  Lab Results   Component Value Date    HGB 9.1 (L) 09/27/2021    HCT 27.2 (L) 09/27/2021    MCV 95.4 09/27/2021     (L) 09/27/2021    WBC 69.21 (C) 09/27/2021    NEUTROABS 38.07 (H) 09/27/2021    LYMPHSABS 4.63 (H) 09/08/2021    MONOSABS 2.98 (H) 09/08/2021    EOSABS 0.02 09/08/2021    BASOSABS 0.09 09/08/2021     Lab Results   Component Value Date    GLUCOSE 80 09/27/2021    BUN 9 09/27/2021    CREATININE 0.88 09/27/2021     09/27/2021    K 3.6 09/27/2021     09/27/2021    CO2 23.0 09/27/2021    CALCIUM 9.8 09/27/2021    PROTEINTOT 6.3 09/27/2021    ALBUMIN 4.20 09/27/2021    BILITOT 0.7 09/27/2021    ALKPHOS 219 (H) 09/27/2021     (H) 09/27/2021    ALT 64 (H) 09/27/2021         Data reviewed: Labs          Assessment and Plan      ASSESSMENT   PTCL  PLAN/RECOMMENDATIONS  Evidence for  Progression on ct scan  Elevated wbc unusual for PTCL    hold chemo  Check flow cytometry  R/o transformation to leukemia  Repeat LN biopsy scheduled  Check uric Acid   start allopurinol  Aggressive hydration if Uric acid increased    Diagnoses and all orders for this visit:    1. H/O lymph node excision    2. 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  Dispense: 90 tablet; Refill: 3      I spent 20 minutes caring for Abiola on this date of service. This time includes time spent by me in the following activities: reviewing tests, counseling and educating the patient/family/caregiver, ordering medications, tests, or procedures, referring and communicating with other health care professionals, documenting information in the medical record and independently interpreting results and communicating that information with the  patient/family/caregiver    Patient was given instructions and counseling regarding her condition or for health maintenance advice. Please see specific information pulled into the AVS if appropriate.     Mabel Goff MD    9/28/2021

## 2021-09-29 ENCOUNTER — APPOINTMENT (OUTPATIENT)
Dept: ONCOLOGY | Facility: HOSPITAL | Age: 56
End: 2021-09-29

## 2021-09-30 ENCOUNTER — HOSPITAL ENCOUNTER (OUTPATIENT)
Dept: ONCOLOGY | Facility: HOSPITAL | Age: 56
Setting detail: INFUSION SERIES
End: 2021-09-30

## 2021-09-30 ENCOUNTER — HOSPITAL ENCOUNTER (OUTPATIENT)
Dept: ULTRASOUND IMAGING | Facility: HOSPITAL | Age: 56
Discharge: HOME | End: 2021-09-30
Admitting: INTERNAL MEDICINE

## 2021-09-30 DIAGNOSIS — R59.0 AXILLARY ADENOPATHY: ICD-10-CM

## 2021-09-30 PROCEDURE — 88305 TISSUE EXAM BY PATHOLOGIST: CPT | Performed by: INTERNAL MEDICINE

## 2021-09-30 PROCEDURE — 88184 FLOWCYTOMETRY/ TC 1 MARKER: CPT

## 2021-09-30 PROCEDURE — 25010000003 LIDOCAINE 1 % SOLUTION: Performed by: INTERNAL MEDICINE

## 2021-09-30 PROCEDURE — 88342 IMHCHEM/IMCYTCHM 1ST ANTB: CPT | Performed by: INTERNAL MEDICINE

## 2021-09-30 PROCEDURE — 88185 FLOWCYTOMETRY/TC ADD-ON: CPT

## 2021-09-30 PROCEDURE — 88237 TISSUE CULTURE BONE MARROW: CPT

## 2021-09-30 PROCEDURE — 88341 IMHCHEM/IMCYTCHM EA ADD ANTB: CPT | Performed by: INTERNAL MEDICINE

## 2021-09-30 PROCEDURE — 76942 ECHO GUIDE FOR BIOPSY: CPT

## 2021-09-30 RX ORDER — LIDOCAINE HYDROCHLORIDE 10 MG/ML
10 INJECTION, SOLUTION INFILTRATION; PERINEURAL ONCE
Status: COMPLETED | OUTPATIENT
Start: 2021-09-30 | End: 2021-09-30

## 2021-09-30 RX ADMIN — LIDOCAINE HYDROCHLORIDE 10 ML: 10 INJECTION, SOLUTION INFILTRATION; PERINEURAL at 14:00

## 2021-10-01 ENCOUNTER — APPOINTMENT (OUTPATIENT)
Dept: ONCOLOGY | Facility: HOSPITAL | Age: 56
End: 2021-10-01

## 2021-10-04 ENCOUNTER — HOSPITAL ENCOUNTER (OUTPATIENT)
Dept: ONCOLOGY | Facility: HOSPITAL | Age: 56
Setting detail: INFUSION SERIES
Discharge: HOME | End: 2021-10-04

## 2021-10-04 ENCOUNTER — TELEPHONE (OUTPATIENT)
Dept: ONCOLOGY | Facility: HOSPITAL | Age: 56
End: 2021-10-04

## 2021-10-04 ENCOUNTER — OFFICE VISIT (OUTPATIENT)
Dept: ONCOLOGY | Facility: HOSPITAL | Age: 56
End: 2021-10-04

## 2021-10-04 VITALS
SYSTOLIC BLOOD PRESSURE: 111 MMHG | RESPIRATION RATE: 18 BRPM | HEART RATE: 103 BPM | TEMPERATURE: 97.6 F | DIASTOLIC BLOOD PRESSURE: 67 MMHG | WEIGHT: 163.14 LBS | BODY MASS INDEX: 27.15 KG/M2 | OXYGEN SATURATION: 98 %

## 2021-10-04 DIAGNOSIS — E86.0 DEHYDRATION: ICD-10-CM

## 2021-10-04 DIAGNOSIS — C84.45 PERIPHERAL T CELL LYMPHOMA OF INGUINAL REGION (HCC): Primary | ICD-10-CM

## 2021-10-04 DIAGNOSIS — Z45.2 ENCOUNTER FOR ADJUSTMENT OR MANAGEMENT OF VASCULAR ACCESS DEVICE: ICD-10-CM

## 2021-10-04 DIAGNOSIS — I82.4Y1 ACUTE DEEP VEIN THROMBOSIS (DVT) OF PROXIMAL VEIN OF RIGHT LOWER EXTREMITY (HCC): ICD-10-CM

## 2021-10-04 LAB
ALBUMIN SERPL-MCNC: 4 G/DL (ref 3.5–5.2)
ALBUMIN/GLOB SERPL: 2 G/DL
ALP SERPL-CCNC: 273 U/L (ref 39–117)
ALT SERPL W P-5'-P-CCNC: 55 U/L (ref 1–33)
ANION GAP SERPL CALCULATED.3IONS-SCNC: 14 MMOL/L (ref 5–15)
AST SERPL-CCNC: 202 U/L (ref 1–32)
BILIRUB SERPL-MCNC: 1.5 MG/DL (ref 0–1.2)
BUN SERPL-MCNC: 12 MG/DL (ref 6–20)
BUN/CREAT SERPL: 9.1 (ref 7–25)
CALCIUM SPEC-SCNC: 9.9 MG/DL (ref 8.6–10.5)
CHLORIDE SERPL-SCNC: 101 MMOL/L (ref 98–107)
CO2 SERPL-SCNC: 22 MMOL/L (ref 22–29)
CREAT SERPL-MCNC: 1.32 MG/DL (ref 0.57–1)
CYTO UR: NORMAL
DEPRECATED RDW RBC AUTO: 92.2 FL (ref 37–54)
ERYTHROCYTE [DISTWIDTH] IN BLOOD BY AUTOMATED COUNT: 26.5 % (ref 12.3–15.4)
GFR SERPL CREATININE-BSD FRML MDRD: 50 ML/MIN/1.73
GLOBULIN UR ELPH-MCNC: 2 GM/DL
GLUCOSE SERPL-MCNC: 121 MG/DL (ref 65–99)
HCT VFR BLD AUTO: 27 % (ref 34–46.6)
HGB BLD-MCNC: 8.5 G/DL (ref 12–15.9)
LAB AP CASE REPORT: NORMAL
LAB AP CLINICAL INFORMATION: NORMAL
LAB AP DIAGNOSIS COMMENT: NORMAL
LAB AP SPECIAL STAINS: NORMAL
LDH SERPL-CCNC: >2500 U/L (ref 135–214)
MCH RBC QN AUTO: 32.2 PG (ref 26.6–33)
MCHC RBC AUTO-ENTMCNC: 31.5 G/DL (ref 31.5–35.7)
MCV RBC AUTO: 102.3 FL (ref 79–97)
PATH REPORT.FINAL DX SPEC: NORMAL
PATH REPORT.GROSS SPEC: NORMAL
PATHOLOGY REVIEW: YES
PLATELET # BLD AUTO: 97 10*3/MM3 (ref 140–450)
PMV BLD AUTO: 11.1 FL (ref 6–12)
POTASSIUM SERPL-SCNC: 3.7 MMOL/L (ref 3.5–5.2)
PROT SERPL-MCNC: 6 G/DL (ref 6–8.5)
RBC # BLD AUTO: 2.64 10*6/MM3 (ref 3.77–5.28)
SCAN SLIDE: NORMAL
SODIUM SERPL-SCNC: 137 MMOL/L (ref 136–145)
URATE SERPL-MCNC: 6.6 MG/DL (ref 2.4–5.7)
WBC # BLD AUTO: 198.95 10*3/MM3 (ref 3.4–10.8)

## 2021-10-04 PROCEDURE — 84550 ASSAY OF BLOOD/URIC ACID: CPT | Performed by: INTERNAL MEDICINE

## 2021-10-04 PROCEDURE — 85007 BL SMEAR W/DIFF WBC COUNT: CPT | Performed by: INTERNAL MEDICINE

## 2021-10-04 PROCEDURE — G0463 HOSPITAL OUTPT CLINIC VISIT: HCPCS | Performed by: INTERNAL MEDICINE

## 2021-10-04 PROCEDURE — 80053 COMPREHEN METABOLIC PANEL: CPT | Performed by: INTERNAL MEDICINE

## 2021-10-04 PROCEDURE — 83615 LACTATE (LD) (LDH) ENZYME: CPT | Performed by: INTERNAL MEDICINE

## 2021-10-04 PROCEDURE — 25010000002 HEPARIN LOCK FLUSH PER 10 UNITS: Performed by: INTERNAL MEDICINE

## 2021-10-04 PROCEDURE — 85025 COMPLETE CBC W/AUTO DIFF WBC: CPT | Performed by: INTERNAL MEDICINE

## 2021-10-04 PROCEDURE — 99215 OFFICE O/P EST HI 40 MIN: CPT | Performed by: INTERNAL MEDICINE

## 2021-10-04 PROCEDURE — 96360 HYDRATION IV INFUSION INIT: CPT

## 2021-10-04 RX ORDER — SODIUM CHLORIDE 0.9 % (FLUSH) 0.9 %
20 SYRINGE (ML) INJECTION AS NEEDED
Status: DISCONTINUED | OUTPATIENT
Start: 2021-10-04 | End: 2021-10-05 | Stop reason: HOSPADM

## 2021-10-04 RX ORDER — HEPARIN SODIUM (PORCINE) LOCK FLUSH IV SOLN 100 UNIT/ML 100 UNIT/ML
500 SOLUTION INTRAVENOUS AS NEEDED
Status: DISCONTINUED | OUTPATIENT
Start: 2021-10-04 | End: 2021-10-05 | Stop reason: HOSPADM

## 2021-10-04 RX ORDER — HEPARIN SODIUM (PORCINE) LOCK FLUSH IV SOLN 100 UNIT/ML 100 UNIT/ML
500 SOLUTION INTRAVENOUS AS NEEDED
Status: CANCELLED | OUTPATIENT
Start: 2021-10-04

## 2021-10-04 RX ORDER — SODIUM CHLORIDE 0.9 % (FLUSH) 0.9 %
20 SYRINGE (ML) INJECTION AS NEEDED
Status: CANCELLED | OUTPATIENT
Start: 2021-10-04

## 2021-10-04 RX ADMIN — SODIUM CHLORIDE 1000 ML: 9 INJECTION, SOLUTION INTRAVENOUS at 15:12

## 2021-10-04 RX ADMIN — SODIUM CHLORIDE, PRESERVATIVE FREE 20 ML: 5 INJECTION INTRAVENOUS at 16:12

## 2021-10-04 RX ADMIN — HEPARIN SODIUM (PORCINE) LOCK FLUSH IV SOLN 100 UNIT/ML 500 UNITS: 100 SOLUTION at 16:12

## 2021-10-04 NOTE — ASSESSMENT & PLAN NOTE
Stage IV.  Patient has been on the CHOEP regimen however restaging scans as well as her physical examination last week consistent with progression.  She is at high risk for morbidity and mortality related to her progressive disease.  She did have biopsy of the axilla which was reviewed today.  This shows peripheral T-cell lymphoma consistent with her prior disease.  Peripheral blood flow cytometry also shows T-cell lymphoma and her white count is increasing.  She has diffuse rash consistent with cutaneous involvement which is markedly worse.  I discussed the case with Dr. English from [facility] who she has seen in the past as well as Dr. Herron from [facility].  Her presentation is consistent with progression or refractory disease to her initial treatment.  She was initially evaluated at [facility] but feels like the travel would be too much for her at this point.  She requests evaluation at [facility].  I discussed her case with Dr. Herron who will expedite evaluation for the patient in terms of next treatment options which will likely include admission for palliative chemotherapy and consideration of allogeneic transplant.

## 2021-10-04 NOTE — ASSESSMENT & PLAN NOTE
Patient is on Eliquis.  She reports some scant blood-tinged sputum but nothing severe.  Monitor the sputum.  Continue Eliquis for now.  She will let us know if there is any change.

## 2021-10-04 NOTE — TELEPHONE ENCOUNTER
pt called and states that her s/s are worse. pt c/o rash from head to toe, sob, fatigue, and blood in her sputum. pt states that her symptoms became worse this past weekend. pt also states that the sob and fatigue were ongoing s/s that got worse and the rash and blood in her sputum started this weekend. pt's last infusion cycle was held. pt has not had a TX since 9/10. pt requesting to speak to Mimi TROY.

## 2021-10-04 NOTE — ADDENDUM NOTE
Encounter addended by: Ysabel Hicks on: 10/4/2021 6:06 PM   Actions taken: Child order released for a procedure order, Order list changed

## 2021-10-04 NOTE — PROGRESS NOTES
Chief Complaint  Lymphoma (-fu)    Jessi Ennis, APRN  Raymon, Jessi L, APRN    Subjective          Abiola Garibay presents to [facility] for follow-up of recent biopsy.  Patient has peripheral T-cell lymphoma and has been receiving chemotherapy with the CHOEP regimen.  She is due for cycle 4 last week but presented with increasing adenopathy and her treatment was held for biopsy.  She underwent axillary biopsy this returned as positive for peripheral T-cell lymphoma consistent with her previously identified disease.  Peripheral blood flow cytometry also demonstrated a T-cell population within the blood.  Patient reports that over the weekend she has had diffuse rash especially over the trunk but also on her arms and legs.  It is somewhat itchy but not painful.  She reports new lymph nodes in the back of her neck, under her chin as well as the ones identified in the armpits last week.  She continues to take Eliquis for right lower extremity DVT.  She reported a little scant blood with a cough today but denies chest pain, fever, chills or purulent sputum.  She states that she is not eating or drinking as well as she could.  She feels a little dry.    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 Negative for appetite change, diaphoresis, fever, unexpected weight gain and unexpected weight loss.   HENT: Negative for hearing loss, mouth sores, sore throat, swollen glands,  trouble swallowing and voice change.    Eyes: Negative for blurred vision.   Respiratory: Positive for cough (BLOOD IN SPUTUM) and shortness of breath. Negative for wheezing.    Cardiovascular: Negative for chest pain and palpitations.   Gastrointestinal: Negative for abdominal pain, blood in stool, constipation, diarrhea, nausea and vomiting.   Endocrine: Negative for cold intolerance and heat intolerance.   Genitourinary: Negative for difficulty urinating, dysuria, frequency, hematuria and urinary incontinence.   Musculoskeletal: Negative for arthralgias, back pain and myalgias.   Skin: Positive for rash (started 10/1- rash is all over). Negative for skin lesions and bruise.   Neurological: Positive for weakness. Negative for dizziness, seizures, numbness and headache.   Hematological: Does not bruise/bleed easily.   Psychiatric/Behavioral: Negative for depressed mood. The patient is not nervous/anxious.    All other systems reviewed and are negative.    Current Outpatient Medications on File Prior to Visit   Medication Sig Dispense Refill   • apixaban (ELIQUIS) 5 MG tablet tablet Take 1 tablet by mouth Every 12 (Twelve) Hours. 60 tablet 5   • docusate sodium (COLACE) 250 MG capsule Take 1 capsule by mouth 2 (Two) Times a Day As Needed for Constipation. 10 capsule 0   • hydroCHLOROthiazide (HYDRODIURIL) 12.5 MG tablet Take 12.5 mg by mouth Daily.     • HYDROcodone-acetaminophen (NORCO) 5-325 MG per tablet Take 1-2 tablets by mouth Every 4 (Four) Hours As Needed (Pain). 20 tablet 0   • norethindrone-ethinyl estradiol (FEMHRT 1/5) 1-5 MG-MCG tablet Take 0.5 tablets by mouth 2 (Two) Times a Day. Combination med of progestrone, estrogen, testosterone, sublingual tab - takes 1/2 in am, 1/2 in pm     • ondansetron (ZOFRAN) 8 MG tablet Take 1 tablet by mouth 3 (Three) Times a Day As Needed for Nausea or Vomiting. 30 tablet 5   • prochlorperazine (COMPAZINE) 10 MG tablet Take 1 tablet by mouth Every 6 (Six) Hours As  Needed for Nausea or Vomiting. 30 tablet 3   • [CANCELED] allopurinol (ZYLOPRIM) 100 MG tablet Take 3 tablets by mouth Daily. 90 tablet 3     No current facility-administered medications on file prior to visit.       Allergies   Allergen Reactions   • Bee Venom Hives   • Lisinopril Other (See Comments)     cough     Past Medical History:   Diagnosis Date   • Acute deep vein thrombosis (DVT) of proximal vein of right lower extremity (HCC) 7/28/2021   • Acute deep vein thrombosis (DVT) of right lower extremity (HCC) 07/20/2021   • Enlarged lymph node     R INGUINAL LYMPHNOD BX, NOW A KNOT GROIN   • High blood pressure    • Lymphoma (HCC)     under care of DR. SIMMONS [facility]   • Swelling of right lower extremity 7/20/2021     Past Surgical History:   Procedure Laterality Date   • ABDOMINAL HYSTERECTOMY      patient still has her ovaries   • BONE MARROW BIOPSY     • BREAST BIOPSY Left 7/23/2021    Procedure: BREAST BIOPSY BIOPSY OF SKIN, SUBCUTANEOUS TISSUE AND/OR MUCOUS MEMBRANE of the left breast;  Surgeon: Grace Rob MD;  Location: [facility] OR [facility] [place];  Service: General;  Laterality: Left;   • BREAST SURGERY      breast reduction (bilateral)    • COLONOSCOPY     • EXCISION LESION Right 6/9/2021    Procedure: EXCISION OF RIGHT INGUINAL LYMPHNOID;  Surgeon: Aaron Gordon MD;  Location: [facility] MAIN OR;  Service: General;  Laterality: Right;   • PORTACATH PLACEMENT Right 7/23/2021    Procedure: INSERTION OF PORTACATH;  Surgeon: Grace Rob MD;  Location: [facility] OR [facility] [place];  Service: General;  Laterality: Right;   • US GUIDED LYMPH NODE BIOPSY  9/30/2021     Social History     Socioeconomic History   • Marital status:      Spouse name: Not on file   • Number of children: Not on file   • Years of education: Not on file   • Highest education level: Not on file   Tobacco Use   • Smoking status: Never Smoker   • Smokeless tobacco: Never Used   Vaping Use   • Vaping Use: Never used   Substance and  Sexual Activity   • Alcohol use: Yes     Comment: rarely    • Drug use: Never   • Sexual activity: Defer     Family History   Problem Relation Age of Onset   • Breast cancer Sister         50S   • Diabetes Paternal Grandmother    • Pancreatic cancer Father    • Malig Hyperthermia Neg Hx        Objective   Physical Exam  Vitals reviewed. Exam conducted with a chaperone present.   Constitutional:       General: She is not in acute distress.     Appearance: Normal appearance.   Cardiovascular:      Rate and Rhythm: Normal rate and regular rhythm.      Heart sounds: Normal heart sounds. No murmur heard.   No gallop.    Pulmonary:      Effort: Pulmonary effort is normal.      Breath sounds: Normal breath sounds.      Comments: Port-A-Cath  Abdominal:      General: Abdomen is flat. Bowel sounds are normal. There is no distension.      Palpations: Abdomen is soft.      Tenderness: There is no abdominal tenderness.   Musculoskeletal:      Cervical back: Neck supple.      Right lower leg: Edema present.      Left lower leg: No edema.   Lymphadenopathy:      Head:      Right side of head: Submental and occipital adenopathy present.      Left side of head: Occipital adenopathy present.      Cervical: Cervical adenopathy present.      Upper Body:      Right upper body: Axillary adenopathy present. No supraclavicular or epitrochlear adenopathy.      Left upper body: Axillary adenopathy present. No supraclavicular or epitrochlear adenopathy.   Neurological:      Mental Status: She is alert and oriented to person, place, and time.   Psychiatric:         Mood and Affect: Mood normal.         Behavior: Behavior normal.         Vitals:    10/04/21 1414   BP: 111/67   Pulse: 103   Resp: 18   Temp: 97.6 °F (36.4 °C)   SpO2: 98%   Weight: 74 kg (163 lb 2.3 oz)   PainSc: 0-No pain     ECOG score: 0         PHQ-9 Total Score:                    Result Review :   The following data was reviewed by: Ramón Holm MD on 10/04/2021:  Lab  Results   Component Value Date    HGB 9.0 (L) 09/28/2021    HCT 27.5 (L) 09/28/2021    MCV 98.2 (H) 09/28/2021     (L) 09/28/2021    WBC 80.46 (C) 09/28/2021    NEUTROABS 29.55 (H) 09/28/2021    NEUTROABS 40.23 (H) 09/28/2021    LYMPHSABS 33.56 (H) 09/28/2021    MONOSABS 12.50 (H) 09/28/2021    EOSABS 0.06 09/28/2021    BASOSABS 0.14 09/28/2021     Lab Results   Component Value Date    GLUCOSE 121 (H) 10/04/2021    BUN 12 10/04/2021    CREATININE 1.32 (H) 10/04/2021     10/04/2021    K 3.7 10/04/2021     10/04/2021    CO2 22.0 10/04/2021    CALCIUM 9.9 10/04/2021    PROTEINTOT 6.0 10/04/2021    ALBUMIN 4.00 10/04/2021    BILITOT 1.5 (H) 10/04/2021    ALKPHOS 273 (H) 10/04/2021     (H) 10/04/2021    ALT 55 (H) 10/04/2021           Assessment and Plan    Diagnoses and all orders for this visit:    1. 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  Patient has been on the CHOEP regimen however restaging scans as well as her physical examination last week consistent with progression.  She is at high risk for morbidity and mortality related to her progressive disease.  She did have biopsy of the axilla which was reviewed today.  This shows peripheral T-cell lymphoma consistent with her prior disease.  Peripheral blood flow cytometry also shows T-cell lymphoma and her white count is increasing.  She has diffuse rash consistent with cutaneous involvement which is markedly worse.  I discussed the case with Dr. English from [facility] who she has seen in the past as well as Dr. Herron from [facility].  Her presentation is consistent with progression or refractory disease to her initial treatment.  She was initially evaluated at [facility] but feels like the travel would be too much for her at this point.  She requests evaluation at [facility].  I discussed her case with Dr. Herron who will expedite evaluation for the  patient in terms of next treatment options which will likely include admission for palliative chemotherapy and consideration of allogeneic transplant.    Orders:  -     CBC & Differential; Future  -     Comprehensive Metabolic Panel; Future  -     Lactate Dehydrogenase; Future  -     Uric Acid; Future    2. Dehydration  Assessment & Plan:  Patient will receive a liter of normal saline today while in clinic.  Discussed need to push fluid intake at home    Orders:  -     ONCBCN INFUSION APPOINTMENT REQUEST 01; Future  -     Cancel: sodium chloride 0.9 % bolus 1,000 mL    3. Acute deep vein thrombosis (DVT) of proximal vein of right lower extremity (HCC)  Assessment & Plan:  Patient is on Eliquis.  She reports some scant blood-tinged sputum but nothing severe.  Monitor the sputum.  Continue Eliquis for now.  She will let us know if there is any change.        Blood work from today returned with a total white blood cell count of 198, hemoglobin 8.4, platelet count 97.  Manual differential demonstrates 10% blasts.  I discussed the case with Dr. Herron from [facility] again who recommends admission to [facility].  I placed a call to bed control-no bed currently available.  The recommendation was for her to proceed to [facility] emergency room as soon as she can arrive        Patient was given instructions and counseling regarding her condition or for health maintenance advice. Please see specific information pulled into the AVS if appropriate.     Ramón Holm MD    10/4/2021

## 2021-10-04 NOTE — ASSESSMENT & PLAN NOTE
Patient will receive a liter of normal saline today while in clinic.  Discussed need to push fluid intake at home

## 2021-10-05 ENCOUNTER — APPOINTMENT (OUTPATIENT)
Dept: ONCOLOGY | Facility: HOSPITAL | Age: 56
End: 2021-10-05

## 2021-10-05 LAB
BLASTS NFR BLD MANUAL: ABNORMAL %
CYTO UR: NORMAL
LAB AP CASE REPORT: NORMAL
LAB AP CLINICAL INFORMATION: NORMAL
LYMPHOCYTES # BLD MANUAL: 131.31 10*3/MM3 (ref 0.7–3.1)
LYMPHOCYTES NFR BLD MANUAL: 32 % (ref 19.6–45.3)
LYMPHOCYTES NFR BLD MANUAL: 8 % (ref 5–12)
MACROCYTES BLD QL SMEAR: ABNORMAL
MONOCYTES # BLD AUTO: 15.92 10*3/MM3 (ref 0.1–0.9)
NEUTROPHILS # BLD AUTO: 51.73 10*3/MM3 (ref 1.7–7)
NEUTROPHILS NFR BLD MANUAL: 24 % (ref 42.7–76)
NEUTS BAND NFR BLD MANUAL: 2 % (ref 0–5)
PATH REPORT.FINAL DX SPEC: NORMAL
PATH REPORT.GROSS SPEC: NORMAL
SMALL PLATELETS BLD QL SMEAR: ABNORMAL
SMUDGE CELLS BLD QL SMEAR: ABNORMAL
VARIANT LYMPHS NFR BLD MANUAL: 34 % (ref 0–5)

## 2021-10-06 ENCOUNTER — APPOINTMENT (OUTPATIENT)
Dept: ONCOLOGY | Facility: HOSPITAL | Age: 56
End: 2021-10-06

## 2021-10-11 DIAGNOSIS — I82.411 ACUTE DEEP VEIN THROMBOSIS (DVT) OF FEMORAL VEIN OF RIGHT LOWER EXTREMITY (HCC): ICD-10-CM

## 2021-10-11 LAB — REF LAB TEST METHOD: NORMAL

## 2021-10-13 LAB — REF LAB TEST METHOD: NORMAL

## 2021-10-20 ENCOUNTER — HOSPITAL ENCOUNTER (OUTPATIENT)
Dept: ONCOLOGY | Facility: HOSPITAL | Age: 56
Setting detail: INFUSION SERIES
End: 2021-10-20

## 2021-10-21 ENCOUNTER — APPOINTMENT (OUTPATIENT)
Dept: ONCOLOGY | Facility: HOSPITAL | Age: 56
End: 2021-10-21

## 2021-10-22 ENCOUNTER — HOSPITAL ENCOUNTER (OUTPATIENT)
Dept: ONCOLOGY | Facility: HOSPITAL | Age: 56
Setting detail: INFUSION SERIES
End: 2021-10-22

## 2021-11-24 ENCOUNTER — HOSPITAL ENCOUNTER (INPATIENT)
Facility: HOSPITAL | Age: 56
LOS: 7 days | Discharge: HOME | End: 2021-12-01
Attending: EMERGENCY MEDICINE | Admitting: INTERNAL MEDICINE

## 2021-11-24 ENCOUNTER — APPOINTMENT (OUTPATIENT)
Dept: CT IMAGING | Facility: HOSPITAL | Age: 56
End: 2021-11-24

## 2021-11-24 ENCOUNTER — APPOINTMENT (OUTPATIENT)
Dept: GENERAL RADIOLOGY | Facility: HOSPITAL | Age: 56
End: 2021-11-24

## 2021-11-24 DIAGNOSIS — R05.9 COUGH: ICD-10-CM

## 2021-11-24 DIAGNOSIS — R06.02 SHORTNESS OF BREATH: Primary | ICD-10-CM

## 2021-11-24 DIAGNOSIS — J18.9 PNEUMONIA OF BOTH LUNGS DUE TO INFECTIOUS ORGANISM, UNSPECIFIED PART OF LUNG: ICD-10-CM

## 2021-11-24 PROBLEM — I50.9 CHF (CONGESTIVE HEART FAILURE): Status: ACTIVE | Noted: 2021-11-24

## 2021-11-24 LAB
ACANTHOCYTES BLD QL SMEAR: NORMAL
ALBUMIN SERPL-MCNC: 3.8 G/DL (ref 3.5–5.2)
ALBUMIN/GLOB SERPL: 1.9 G/DL
ALP SERPL-CCNC: 197 U/L (ref 39–117)
ALT SERPL W P-5'-P-CCNC: 25 U/L (ref 1–33)
ANION GAP SERPL CALCULATED.3IONS-SCNC: 13.1 MMOL/L (ref 5–15)
ANISOCYTOSIS BLD QL: NORMAL
AST SERPL-CCNC: 41 U/L (ref 1–32)
BASOPHILS # BLD AUTO: 0.05 10*3/MM3 (ref 0–0.2)
BASOPHILS NFR BLD AUTO: 0.2 % (ref 0–1.5)
BILIRUB SERPL-MCNC: 1.4 MG/DL (ref 0–1.2)
BUN SERPL-MCNC: 19 MG/DL (ref 6–20)
BUN/CREAT SERPL: 17.3 (ref 7–25)
C3 FRG RBC-MCNC: NORMAL
CALCIUM SPEC-SCNC: 9.1 MG/DL (ref 8.6–10.5)
CHLORIDE SERPL-SCNC: 102 MMOL/L (ref 98–107)
CO2 SERPL-SCNC: 23.9 MMOL/L (ref 22–29)
CREAT SERPL-MCNC: 1.1 MG/DL (ref 0.57–1)
D DIMER PPP FEU-MCNC: 3.34 MG/L (FEU) (ref 0–0.59)
D-LACTATE SERPL-SCNC: 2.4 MMOL/L (ref 0.5–2)
D-LACTATE SERPL-SCNC: 3.2 MMOL/L (ref 0.5–2)
DEPRECATED RDW RBC AUTO: 64.6 FL (ref 37–54)
EOSINOPHIL # BLD AUTO: 0 10*3/MM3 (ref 0–0.4)
EOSINOPHIL NFR BLD AUTO: 0 % (ref 0.3–6.2)
ERYTHROCYTE [DISTWIDTH] IN BLOOD BY AUTOMATED COUNT: 20.5 % (ref 12.3–15.4)
GFR SERPL CREATININE-BSD FRML MDRD: 62 ML/MIN/1.73
GLOBULIN UR ELPH-MCNC: 2 GM/DL
GLUCOSE SERPL-MCNC: 158 MG/DL (ref 65–99)
HCT VFR BLD AUTO: 24.9 % (ref 34–46.6)
HGB BLD-MCNC: 8.5 G/DL (ref 12–15.9)
HOLD SPECIMEN: NORMAL
HOLD SPECIMEN: NORMAL
IMM GRANULOCYTES # BLD AUTO: 1.31 10*3/MM3 (ref 0–0.05)
IMM GRANULOCYTES NFR BLD AUTO: 5.8 % (ref 0–0.5)
LYMPHOCYTES # BLD AUTO: 1.59 10*3/MM3 (ref 0.7–3.1)
LYMPHOCYTES NFR BLD AUTO: 7 % (ref 19.6–45.3)
MACROCYTES BLD QL SMEAR: NORMAL
MCH RBC QN AUTO: 32.3 PG (ref 26.6–33)
MCHC RBC AUTO-ENTMCNC: 34.1 G/DL (ref 31.5–35.7)
MCV RBC AUTO: 94.7 FL (ref 79–97)
MICROCYTES BLD QL: NORMAL
MONOCYTES # BLD AUTO: 4.47 10*3/MM3 (ref 0.1–0.9)
MONOCYTES NFR BLD AUTO: 19.8 % (ref 5–12)
NEUTROPHILS NFR BLD AUTO: 15.21 10*3/MM3 (ref 1.7–7)
NEUTROPHILS NFR BLD AUTO: 67.2 % (ref 42.7–76)
NRBC BLD AUTO-RTO: 0.1 /100 WBC (ref 0–0.2)
NT-PROBNP SERPL-MCNC: ABNORMAL PG/ML (ref 0–900)
PLATELET # BLD AUTO: 62 10*3/MM3 (ref 140–450)
PMV BLD AUTO: 12.4 FL (ref 6–12)
POIKILOCYTOSIS BLD QL SMEAR: NORMAL
POLYCHROMASIA BLD QL SMEAR: NORMAL
POTASSIUM SERPL-SCNC: 3.7 MMOL/L (ref 3.5–5.2)
PROCALCITONIN SERPL-MCNC: 0.11 NG/ML (ref 0–0.25)
PROT SERPL-MCNC: 5.8 G/DL (ref 6–8.5)
QT INTERVAL: 339 MS
RBC # BLD AUTO: 2.63 10*6/MM3 (ref 3.77–5.28)
SARS-COV-2 RNA RESP QL NAA+PROBE: NOT DETECTED
SMALL PLATELETS BLD QL SMEAR: NORMAL
SODIUM SERPL-SCNC: 139 MMOL/L (ref 136–145)
TROPONIN T SERPL-MCNC: 0.02 NG/ML (ref 0–0.03)
WBC MORPH BLD: NORMAL
WBC NRBC COR # BLD: 22.63 10*3/MM3 (ref 3.4–10.8)
WHOLE BLOOD HOLD SPECIMEN: NORMAL
WHOLE BLOOD HOLD SPECIMEN: NORMAL

## 2021-11-24 PROCEDURE — 93005 ELECTROCARDIOGRAM TRACING: CPT

## 2021-11-24 PROCEDURE — 84145 PROCALCITONIN (PCT): CPT | Performed by: NURSE PRACTITIONER

## 2021-11-24 PROCEDURE — 94640 AIRWAY INHALATION TREATMENT: CPT

## 2021-11-24 PROCEDURE — 71045 X-RAY EXAM CHEST 1 VIEW: CPT

## 2021-11-24 PROCEDURE — 25010000002 VANCOMYCIN 5 G RECONSTITUTED SOLUTION

## 2021-11-24 PROCEDURE — 85379 FIBRIN DEGRADATION QUANT: CPT | Performed by: NURSE PRACTITIONER

## 2021-11-24 PROCEDURE — 85007 BL SMEAR W/DIFF WBC COUNT: CPT | Performed by: EMERGENCY MEDICINE

## 2021-11-24 PROCEDURE — 25010000002 FUROSEMIDE PER 20 MG: Performed by: INTERNAL MEDICINE

## 2021-11-24 PROCEDURE — 0 IOPAMIDOL PER 1 ML: Performed by: EMERGENCY MEDICINE

## 2021-11-24 PROCEDURE — 87186 SC STD MICRODIL/AGAR DIL: CPT | Performed by: NURSE PRACTITIONER

## 2021-11-24 PROCEDURE — 87077 CULTURE AEROBIC IDENTIFY: CPT | Performed by: NURSE PRACTITIONER

## 2021-11-24 PROCEDURE — 25010000002 FUROSEMIDE PER 20 MG: Performed by: NURSE PRACTITIONER

## 2021-11-24 PROCEDURE — 83880 ASSAY OF NATRIURETIC PEPTIDE: CPT | Performed by: EMERGENCY MEDICINE

## 2021-11-24 PROCEDURE — 84484 ASSAY OF TROPONIN QUANT: CPT

## 2021-11-24 PROCEDURE — 93010 ELECTROCARDIOGRAM REPORT: CPT | Performed by: INTERNAL MEDICINE

## 2021-11-24 PROCEDURE — 94799 UNLISTED PULMONARY SVC/PX: CPT

## 2021-11-24 PROCEDURE — 99284 EMERGENCY DEPT VISIT MOD MDM: CPT

## 2021-11-24 PROCEDURE — 25010000002 CEFEPIME PER 500 MG: Performed by: INTERNAL MEDICINE

## 2021-11-24 PROCEDURE — 87641 MR-STAPH DNA AMP PROBE: CPT | Performed by: INTERNAL MEDICINE

## 2021-11-24 PROCEDURE — 87186 SC STD MICRODIL/AGAR DIL: CPT

## 2021-11-24 PROCEDURE — U0003 INFECTIOUS AGENT DETECTION BY NUCLEIC ACID (DNA OR RNA); SEVERE ACUTE RESPIRATORY SYNDROME CORONAVIRUS 2 (SARS-COV-2) (CORONAVIRUS DISEASE [COVID-19]), AMPLIFIED PROBE TECHNIQUE, MAKING USE OF HIGH THROUGHPUT TECHNOLOGIES AS DESCRIBED BY CMS-2020-01-R: HCPCS | Performed by: NURSE PRACTITIONER

## 2021-11-24 PROCEDURE — 71275 CT ANGIOGRAPHY CHEST: CPT

## 2021-11-24 PROCEDURE — 87040 BLOOD CULTURE FOR BACTERIA: CPT | Performed by: NURSE PRACTITIONER

## 2021-11-24 PROCEDURE — 83605 ASSAY OF LACTIC ACID: CPT | Performed by: NURSE PRACTITIONER

## 2021-11-24 PROCEDURE — 94760 N-INVAS EAR/PLS OXIMETRY 1: CPT

## 2021-11-24 PROCEDURE — 25010000002 CEFEPIME PER 500 MG: Performed by: NURSE PRACTITIONER

## 2021-11-24 PROCEDURE — 85025 COMPLETE CBC W/AUTO DIFF WBC: CPT | Performed by: EMERGENCY MEDICINE

## 2021-11-24 PROCEDURE — 93005 ELECTROCARDIOGRAM TRACING: CPT | Performed by: EMERGENCY MEDICINE

## 2021-11-24 PROCEDURE — 87150 DNA/RNA AMPLIFIED PROBE: CPT | Performed by: NURSE PRACTITIONER

## 2021-11-24 PROCEDURE — 80053 COMPREHEN METABOLIC PANEL: CPT

## 2021-11-24 RX ORDER — CEFEPIME 1 G/50ML
2 INJECTION, SOLUTION INTRAVENOUS EVERY 8 HOURS
Status: DISCONTINUED | OUTPATIENT
Start: 2021-11-24 | End: 2021-11-25

## 2021-11-24 RX ORDER — FAMOTIDINE 20 MG/1
20 TABLET, FILM COATED ORAL 2 TIMES DAILY
COMMUNITY
Start: 2021-11-02

## 2021-11-24 RX ORDER — FUROSEMIDE 10 MG/ML
80 INJECTION INTRAMUSCULAR; INTRAVENOUS EVERY 12 HOURS
Status: DISCONTINUED | OUTPATIENT
Start: 2021-11-24 | End: 2021-11-25

## 2021-11-24 RX ORDER — IPRATROPIUM BROMIDE AND ALBUTEROL SULFATE 2.5; .5 MG/3ML; MG/3ML
3 SOLUTION RESPIRATORY (INHALATION)
Status: DISCONTINUED | OUTPATIENT
Start: 2021-11-24 | End: 2021-11-26

## 2021-11-24 RX ORDER — SODIUM CHLORIDE 0.9 % (FLUSH) 0.9 %
10 SYRINGE (ML) INJECTION AS NEEDED
Status: DISCONTINUED | OUTPATIENT
Start: 2021-11-24 | End: 2021-12-01 | Stop reason: HOSPADM

## 2021-11-24 RX ORDER — AMOXICILLIN 250 MG
1 CAPSULE ORAL DAILY
COMMUNITY

## 2021-11-24 RX ORDER — DEXAMETHASONE 4 MG/1
4 TABLET ORAL
COMMUNITY
Start: 2021-11-22

## 2021-11-24 RX ORDER — FUROSEMIDE 40 MG/1
40 TABLET ORAL DAILY
COMMUNITY
Start: 2021-11-22

## 2021-11-24 RX ORDER — SULFAMETHOXAZOLE AND TRIMETHOPRIM 800; 160 MG/1; MG/1
1 TABLET ORAL 2 TIMES DAILY
COMMUNITY
Start: 2021-11-02 | End: 2021-12-01 | Stop reason: HOSPADM

## 2021-11-24 RX ORDER — CEFEPIME 1 G/50ML
2 INJECTION, SOLUTION INTRAVENOUS ONCE
Status: COMPLETED | OUTPATIENT
Start: 2021-11-24 | End: 2021-11-24

## 2021-11-24 RX ORDER — FOLIC ACID 1 MG/1
1 TABLET ORAL DAILY
COMMUNITY
Start: 2021-11-23

## 2021-11-24 RX ORDER — ONDANSETRON 2 MG/ML
4 INJECTION INTRAMUSCULAR; INTRAVENOUS EVERY 6 HOURS PRN
Status: DISCONTINUED | OUTPATIENT
Start: 2021-11-24 | End: 2021-12-01 | Stop reason: HOSPADM

## 2021-11-24 RX ORDER — ALLOPURINOL 300 MG/1
1 TABLET ORAL 2 TIMES DAILY
COMMUNITY
Start: 2021-11-23

## 2021-11-24 RX ORDER — FUROSEMIDE 10 MG/ML
40 INJECTION INTRAMUSCULAR; INTRAVENOUS ONCE
Status: COMPLETED | OUTPATIENT
Start: 2021-11-24 | End: 2021-11-24

## 2021-11-24 RX ADMIN — IPRATROPIUM BROMIDE AND ALBUTEROL SULFATE 3 ML: .5; 2.5 SOLUTION RESPIRATORY (INHALATION) at 20:18

## 2021-11-24 RX ADMIN — CEFEPIME 2 G: 1 INJECTION, SOLUTION INTRAVENOUS at 23:00

## 2021-11-24 RX ADMIN — FUROSEMIDE 80 MG: 10 INJECTION, SOLUTION INTRAMUSCULAR; INTRAVENOUS at 17:42

## 2021-11-24 RX ADMIN — SODIUM CHLORIDE, PRESERVATIVE FREE 10 ML: 5 INJECTION INTRAVENOUS at 17:43

## 2021-11-24 RX ADMIN — IOPAMIDOL 100 ML: 755 INJECTION, SOLUTION INTRAVENOUS at 12:54

## 2021-11-24 RX ADMIN — FUROSEMIDE 40 MG: 10 INJECTION, SOLUTION INTRAMUSCULAR; INTRAVENOUS at 14:15

## 2021-11-24 RX ADMIN — CEFEPIME 2 G: 1 INJECTION, SOLUTION INTRAVENOUS at 13:15

## 2021-11-24 RX ADMIN — Medication 1500 MG: at 15:20

## 2021-11-25 ENCOUNTER — APPOINTMENT (OUTPATIENT)
Dept: GENERAL RADIOLOGY | Facility: HOSPITAL | Age: 56
End: 2021-11-25

## 2021-11-25 LAB
ALBUMIN SERPL-MCNC: 3.6 G/DL (ref 3.5–5.2)
ALBUMIN/GLOB SERPL: 1.8 G/DL
ALP SERPL-CCNC: 162 U/L (ref 39–117)
ALT SERPL W P-5'-P-CCNC: 22 U/L (ref 1–33)
ANION GAP SERPL CALCULATED.3IONS-SCNC: 13.5 MMOL/L (ref 5–15)
AST SERPL-CCNC: 34 U/L (ref 1–32)
BILIRUB SERPL-MCNC: 1.3 MG/DL (ref 0–1.2)
BUN SERPL-MCNC: 20 MG/DL (ref 6–20)
BUN/CREAT SERPL: 16.8 (ref 7–25)
CALCIUM SPEC-SCNC: 9.4 MG/DL (ref 8.6–10.5)
CHLORIDE SERPL-SCNC: 100 MMOL/L (ref 98–107)
CO2 SERPL-SCNC: 26.5 MMOL/L (ref 22–29)
CREAT SERPL-MCNC: 1.19 MG/DL (ref 0.57–1)
D-LACTATE SERPL-SCNC: 1.7 MMOL/L (ref 0.5–2)
GFR SERPL CREATININE-BSD FRML MDRD: 57 ML/MIN/1.73
GLOBULIN UR ELPH-MCNC: 2 GM/DL
GLUCOSE SERPL-MCNC: 101 MG/DL (ref 65–99)
LDH SERPL-CCNC: 593 U/L (ref 135–214)
MRSA DNA SPEC QL NAA+PROBE: NORMAL
POTASSIUM SERPL-SCNC: 3.4 MMOL/L (ref 3.5–5.2)
PROCALCITONIN SERPL-MCNC: 0.12 NG/ML (ref 0–0.25)
PROT SERPL-MCNC: 5.6 G/DL (ref 6–8.5)
SODIUM SERPL-SCNC: 140 MMOL/L (ref 136–145)
TROPONIN T SERPL-MCNC: <0.01 NG/ML (ref 0–0.03)

## 2021-11-25 PROCEDURE — 83605 ASSAY OF LACTIC ACID: CPT | Performed by: INTERNAL MEDICINE

## 2021-11-25 PROCEDURE — 93005 ELECTROCARDIOGRAM TRACING: CPT | Performed by: INTERNAL MEDICINE

## 2021-11-25 PROCEDURE — 84484 ASSAY OF TROPONIN QUANT: CPT | Performed by: SPECIALIST

## 2021-11-25 PROCEDURE — 93010 ELECTROCARDIOGRAM REPORT: CPT | Performed by: INTERNAL MEDICINE

## 2021-11-25 PROCEDURE — 94799 UNLISTED PULMONARY SVC/PX: CPT

## 2021-11-25 PROCEDURE — 25010000002 CEFEPIME PER 500 MG: Performed by: INTERNAL MEDICINE

## 2021-11-25 PROCEDURE — 80053 COMPREHEN METABOLIC PANEL: CPT | Performed by: INTERNAL MEDICINE

## 2021-11-25 PROCEDURE — 84145 PROCALCITONIN (PCT): CPT | Performed by: INTERNAL MEDICINE

## 2021-11-25 PROCEDURE — 86769 SARS-COV-2 COVID-19 ANTIBODY: CPT | Performed by: INTERNAL MEDICINE

## 2021-11-25 PROCEDURE — 83615 LACTATE (LD) (LDH) ENZYME: CPT | Performed by: INTERNAL MEDICINE

## 2021-11-25 PROCEDURE — 25010000002 FUROSEMIDE PER 20 MG: Performed by: INTERNAL MEDICINE

## 2021-11-25 PROCEDURE — 71046 X-RAY EXAM CHEST 2 VIEWS: CPT

## 2021-11-25 PROCEDURE — 63710000001 DEXAMETHASONE PER 0.25 MG: Performed by: INTERNAL MEDICINE

## 2021-11-25 RX ORDER — FOLIC ACID 1 MG/1
1 TABLET ORAL DAILY
Status: DISCONTINUED | OUTPATIENT
Start: 2021-11-25 | End: 2021-12-01 | Stop reason: HOSPADM

## 2021-11-25 RX ORDER — ALLOPURINOL 300 MG/1
300 TABLET ORAL DAILY
Status: DISCONTINUED | OUTPATIENT
Start: 2021-11-25 | End: 2021-12-01 | Stop reason: HOSPADM

## 2021-11-25 RX ORDER — METOLAZONE 5 MG/1
10 TABLET ORAL ONCE
Status: COMPLETED | OUTPATIENT
Start: 2021-11-25 | End: 2021-11-25

## 2021-11-25 RX ORDER — FUROSEMIDE 10 MG/ML
40 INJECTION INTRAMUSCULAR; INTRAVENOUS EVERY 12 HOURS
Status: DISCONTINUED | OUTPATIENT
Start: 2021-11-25 | End: 2021-11-26

## 2021-11-25 RX ORDER — DEXAMETHASONE 4 MG/1
4 TABLET ORAL
Status: DISCONTINUED | OUTPATIENT
Start: 2021-11-25 | End: 2021-12-01 | Stop reason: HOSPADM

## 2021-11-25 RX ORDER — AMOXICILLIN 250 MG
1 CAPSULE ORAL DAILY
Status: DISCONTINUED | OUTPATIENT
Start: 2021-11-25 | End: 2021-12-01 | Stop reason: HOSPADM

## 2021-11-25 RX ORDER — FAMOTIDINE 20 MG/1
20 TABLET, FILM COATED ORAL 2 TIMES DAILY
Status: DISCONTINUED | OUTPATIENT
Start: 2021-11-25 | End: 2021-12-01 | Stop reason: HOSPADM

## 2021-11-25 RX ORDER — POTASSIUM CHLORIDE 750 MG/1
20 CAPSULE, EXTENDED RELEASE ORAL 2 TIMES DAILY WITH MEALS
Status: DISCONTINUED | OUTPATIENT
Start: 2021-11-25 | End: 2021-11-26

## 2021-11-25 RX ORDER — ALLOPURINOL 300 MG/1
300 TABLET ORAL 2 TIMES DAILY
Status: DISCONTINUED | OUTPATIENT
Start: 2021-11-25 | End: 2021-11-25

## 2021-11-25 RX ORDER — SULFAMETHOXAZOLE AND TRIMETHOPRIM 800; 160 MG/1; MG/1
1 TABLET ORAL EVERY 12 HOURS SCHEDULED
Status: COMPLETED | OUTPATIENT
Start: 2021-11-27 | End: 2021-11-28

## 2021-11-25 RX ORDER — CEFEPIME 1 G/50ML
2 INJECTION, SOLUTION INTRAVENOUS EVERY 12 HOURS
Status: DISCONTINUED | OUTPATIENT
Start: 2021-11-25 | End: 2021-11-26

## 2021-11-25 RX ADMIN — ALLOPURINOL 300 MG: 300 TABLET ORAL at 15:01

## 2021-11-25 RX ADMIN — POTASSIUM CHLORIDE 20 MEQ: 750 CAPSULE, EXTENDED RELEASE ORAL at 15:01

## 2021-11-25 RX ADMIN — FUROSEMIDE 80 MG: 10 INJECTION, SOLUTION INTRAMUSCULAR; INTRAVENOUS at 05:44

## 2021-11-25 RX ADMIN — CEFEPIME 2 G: 1 INJECTION, SOLUTION INTRAVENOUS at 05:44

## 2021-11-25 RX ADMIN — FAMOTIDINE 20 MG: 20 TABLET, FILM COATED ORAL at 20:37

## 2021-11-25 RX ADMIN — FOLIC ACID 1 MG: 1 TABLET ORAL at 15:01

## 2021-11-25 RX ADMIN — IPRATROPIUM BROMIDE AND ALBUTEROL SULFATE 3 ML: .5; 2.5 SOLUTION RESPIRATORY (INHALATION) at 00:15

## 2021-11-25 RX ADMIN — METOLAZONE 10 MG: 5 TABLET ORAL at 15:01

## 2021-11-25 RX ADMIN — CEFEPIME 2 G: 1 INJECTION, SOLUTION INTRAVENOUS at 17:53

## 2021-11-25 RX ADMIN — DEXAMETHASONE 4 MG: 4 TABLET ORAL at 15:01

## 2021-11-26 ENCOUNTER — APPOINTMENT (OUTPATIENT)
Dept: CARDIOLOGY | Facility: HOSPITAL | Age: 56
End: 2021-11-26

## 2021-11-26 LAB
ALBUMIN SERPL-MCNC: 3.5 G/DL (ref 3.5–5.2)
ALBUMIN/GLOB SERPL: 1.8 G/DL
ALP SERPL-CCNC: 158 U/L (ref 39–117)
ALT SERPL W P-5'-P-CCNC: 21 U/L (ref 1–33)
ANION GAP SERPL CALCULATED.3IONS-SCNC: 13 MMOL/L (ref 5–15)
ANISOCYTOSIS BLD QL: ABNORMAL
AST SERPL-CCNC: 33 U/L (ref 1–32)
BILIRUB SERPL-MCNC: 1.1 MG/DL (ref 0–1.2)
BUN SERPL-MCNC: 21 MG/DL (ref 6–20)
BUN/CREAT SERPL: 18.8 (ref 7–25)
CALCIUM SPEC-SCNC: 8.6 MG/DL (ref 8.6–10.5)
CHLORIDE SERPL-SCNC: 100 MMOL/L (ref 98–107)
CO2 SERPL-SCNC: 25 MMOL/L (ref 22–29)
CREAT SERPL-MCNC: 1.12 MG/DL (ref 0.57–1)
DEPRECATED RDW RBC AUTO: 59.7 FL (ref 37–54)
ERYTHROCYTE [DISTWIDTH] IN BLOOD BY AUTOMATED COUNT: 20.8 % (ref 12.3–15.4)
GFR SERPL CREATININE-BSD FRML MDRD: 61 ML/MIN/1.73
GLOBULIN UR ELPH-MCNC: 1.9 GM/DL
GLUCOSE SERPL-MCNC: 114 MG/DL (ref 65–99)
HCT VFR BLD AUTO: 21.8 % (ref 34–46.6)
HGB BLD-MCNC: 7.5 G/DL (ref 12–15.9)
LYMPHOCYTES # BLD MANUAL: 0.57 10*3/MM3 (ref 0.7–3.1)
LYMPHOCYTES NFR BLD MANUAL: 14 % (ref 5–12)
MACROCYTES BLD QL SMEAR: ABNORMAL
MAGNESIUM SERPL-MCNC: 1.1 MG/DL (ref 1.6–2.6)
MCH RBC QN AUTO: 31.8 PG (ref 26.6–33)
MCHC RBC AUTO-ENTMCNC: 34.4 G/DL (ref 31.5–35.7)
MCV RBC AUTO: 92.4 FL (ref 79–97)
MONOCYTES # BLD: 2.64 10*3/MM3 (ref 0.1–0.9)
NEUTROPHILS # BLD AUTO: 15.67 10*3/MM3 (ref 1.7–7)
NEUTROPHILS NFR BLD MANUAL: 81 % (ref 42.7–76)
NEUTS BAND NFR BLD MANUAL: 2 % (ref 0–5)
OVALOCYTES BLD QL SMEAR: ABNORMAL
PLATELET # BLD AUTO: 55 10*3/MM3 (ref 140–450)
PMV BLD AUTO: 11.8 FL (ref 6–12)
POIKILOCYTOSIS BLD QL SMEAR: ABNORMAL
POTASSIUM SERPL-SCNC: 3.1 MMOL/L (ref 3.5–5.2)
PROT SERPL-MCNC: 5.4 G/DL (ref 6–8.5)
RBC # BLD AUTO: 2.36 10*6/MM3 (ref 3.77–5.28)
SCAN SLIDE: NORMAL
SMALL PLATELETS BLD QL SMEAR: ABNORMAL
SODIUM SERPL-SCNC: 138 MMOL/L (ref 136–145)
VARIANT LYMPHS NFR BLD MANUAL: 3 % (ref 19.6–45.3)
WBC MORPH BLD: NORMAL
WBC NRBC COR # BLD: 18.88 10*3/MM3 (ref 3.4–10.8)

## 2021-11-26 PROCEDURE — 63710000001 DEXAMETHASONE PER 0.25 MG: Performed by: INTERNAL MEDICINE

## 2021-11-26 PROCEDURE — 80053 COMPREHEN METABOLIC PANEL: CPT | Performed by: INTERNAL MEDICINE

## 2021-11-26 PROCEDURE — 93306 TTE W/DOPPLER COMPLETE: CPT

## 2021-11-26 PROCEDURE — 25010000002 FUROSEMIDE PER 20 MG: Performed by: INTERNAL MEDICINE

## 2021-11-26 PROCEDURE — 85025 COMPLETE CBC W/AUTO DIFF WBC: CPT | Performed by: INTERNAL MEDICINE

## 2021-11-26 PROCEDURE — 85007 BL SMEAR W/DIFF WBC COUNT: CPT | Performed by: INTERNAL MEDICINE

## 2021-11-26 PROCEDURE — 83735 ASSAY OF MAGNESIUM: CPT | Performed by: INTERNAL MEDICINE

## 2021-11-26 PROCEDURE — 25010000002 CEFEPIME PER 500 MG: Performed by: INTERNAL MEDICINE

## 2021-11-26 RX ORDER — IPRATROPIUM BROMIDE AND ALBUTEROL SULFATE 2.5; .5 MG/3ML; MG/3ML
3 SOLUTION RESPIRATORY (INHALATION) EVERY 4 HOURS PRN
Status: DISCONTINUED | OUTPATIENT
Start: 2021-11-26 | End: 2021-12-01 | Stop reason: HOSPADM

## 2021-11-26 RX ORDER — FUROSEMIDE 40 MG/1
40 TABLET ORAL
Status: DISCONTINUED | OUTPATIENT
Start: 2021-11-26 | End: 2021-11-26

## 2021-11-26 RX ORDER — FUROSEMIDE 20 MG/1
20 TABLET ORAL
Status: DISCONTINUED | OUTPATIENT
Start: 2021-11-27 | End: 2021-12-01 | Stop reason: HOSPADM

## 2021-11-26 RX ORDER — POTASSIUM CHLORIDE 750 MG/1
20 CAPSULE, EXTENDED RELEASE ORAL 4 TIMES DAILY
Status: DISCONTINUED | OUTPATIENT
Start: 2021-11-26 | End: 2021-11-27

## 2021-11-26 RX ADMIN — FAMOTIDINE 20 MG: 20 TABLET, FILM COATED ORAL at 07:44

## 2021-11-26 RX ADMIN — ALLOPURINOL 300 MG: 300 TABLET ORAL at 08:56

## 2021-11-26 RX ADMIN — FOLIC ACID 1 MG: 1 TABLET ORAL at 08:56

## 2021-11-26 RX ADMIN — SENNOSIDES AND DOCUSATE SODIUM 1 TABLET: 50; 8.6 TABLET ORAL at 07:44

## 2021-11-26 RX ADMIN — DEXAMETHASONE 4 MG: 4 TABLET ORAL at 08:56

## 2021-11-26 RX ADMIN — POTASSIUM CHLORIDE 20 MEQ: 750 CAPSULE, EXTENDED RELEASE ORAL at 22:33

## 2021-11-26 RX ADMIN — POTASSIUM CHLORIDE 20 MEQ: 750 CAPSULE, EXTENDED RELEASE ORAL at 17:20

## 2021-11-26 RX ADMIN — POTASSIUM CHLORIDE 20 MEQ: 750 CAPSULE, EXTENDED RELEASE ORAL at 12:16

## 2021-11-26 RX ADMIN — CEFEPIME 2 G: 1 INJECTION, SOLUTION INTRAVENOUS at 05:19

## 2021-11-26 RX ADMIN — POTASSIUM CHLORIDE 20 MEQ: 750 CAPSULE, EXTENDED RELEASE ORAL at 07:39

## 2021-11-26 RX ADMIN — FUROSEMIDE 40 MG: 10 INJECTION INTRAMUSCULAR; INTRAVENOUS at 05:22

## 2021-11-26 RX ADMIN — FAMOTIDINE 20 MG: 20 TABLET, FILM COATED ORAL at 22:33

## 2021-11-27 LAB
ANION GAP SERPL CALCULATED.3IONS-SCNC: 15.6 MMOL/L (ref 5–15)
BH CV ECHO MEAS - AO ROOT DIAM: 3.2 CM
BH CV ECHO MEAS - EDV(MOD-SP2): 70 ML
BH CV ECHO MEAS - EDV(MOD-SP4): 70 ML
BH CV ECHO MEAS - EF(MOD-BP): 37 %
BH CV ECHO MEAS - ESV(MOD-SP2): 44 ML
BH CV ECHO MEAS - ESV(MOD-SP4): 37 ML
BH CV ECHO MEAS - IVSD: 1.2 CM
BH CV ECHO MEAS - LA DIMENSION(2D): 2.8 CM
BH CV ECHO MEAS - LAT PEAK E' VEL: 8.2 CM/SEC
BH CV ECHO MEAS - LVIDD: 4.3 CM
BH CV ECHO MEAS - LVIDS: 3.4 CM
BH CV ECHO MEAS - LVOT DIAM: 2 CM
BH CV ECHO MEAS - LVPWD: 1 CM
BH CV ECHO MEAS - MED PEAK E' VEL: 5 CM/SEC
BH CV ECHO MEAS - MV A MAX VEL: 73 CM/SEC
BH CV ECHO MEAS - MV DEC TIME: 160 MSEC
BH CV ECHO MEAS - MV E MAX VEL: 95 CM/SEC
BH CV ECHO MEAS - MV E/A: 1.3
BH CV ECHO MEAS - RVDD: 2.4 CM
BH CV ECHO MEASUREMENTS AVERAGE E/E' RATIO: 14.39
BUN SERPL-MCNC: 19 MG/DL (ref 6–20)
BUN/CREAT SERPL: 19.8 (ref 7–25)
CALCIUM SPEC-SCNC: 9.2 MG/DL (ref 8.6–10.5)
CHLORIDE SERPL-SCNC: 96 MMOL/L (ref 98–107)
CO2 SERPL-SCNC: 25.4 MMOL/L (ref 22–29)
CREAT SERPL-MCNC: 0.96 MG/DL (ref 0.57–1)
GFR SERPL CREATININE-BSD FRML MDRD: 73 ML/MIN/1.73
GLUCOSE SERPL-MCNC: 115 MG/DL (ref 65–99)
IVRT: 82 MSEC
LEFT ATRIUM VOLUME INDEX: 19.7 ML/M2
MAXIMAL PREDICTED HEART RATE: 163 BPM
POTASSIUM SERPL-SCNC: 3.2 MMOL/L (ref 3.5–5.2)
SARS-COV-2 AB SERPL IA-ACNC: 2007 U/ML
SARS-COV-2 AB SERPL-IMP: POSITIVE
SODIUM SERPL-SCNC: 137 MMOL/L (ref 136–145)
STRESS TARGET HR: 139 BPM

## 2021-11-27 PROCEDURE — 80048 BASIC METABOLIC PNL TOTAL CA: CPT | Performed by: SPECIALIST

## 2021-11-27 PROCEDURE — 63710000001 DEXAMETHASONE PER 0.25 MG: Performed by: INTERNAL MEDICINE

## 2021-11-27 PROCEDURE — 25010000002 DIGOXIN PER 500 MCG: Performed by: SPECIALIST

## 2021-11-27 RX ORDER — POTASSIUM CHLORIDE 750 MG/1
20 CAPSULE, EXTENDED RELEASE ORAL ONCE
Status: DISCONTINUED | OUTPATIENT
Start: 2021-11-27 | End: 2021-12-01 | Stop reason: HOSPADM

## 2021-11-27 RX ORDER — LOSARTAN POTASSIUM 25 MG/1
25 TABLET ORAL
Status: DISCONTINUED | OUTPATIENT
Start: 2021-11-27 | End: 2021-12-01 | Stop reason: HOSPADM

## 2021-11-27 RX ORDER — SPIRONOLACTONE 25 MG/1
25 TABLET ORAL DAILY
Status: DISCONTINUED | OUTPATIENT
Start: 2021-11-27 | End: 2021-12-01 | Stop reason: HOSPADM

## 2021-11-27 RX ORDER — DIGOXIN 0.25 MG/ML
250 INJECTION INTRAMUSCULAR; INTRAVENOUS ONCE
Status: COMPLETED | OUTPATIENT
Start: 2021-11-27 | End: 2021-11-27

## 2021-11-27 RX ADMIN — FUROSEMIDE 20 MG: 20 TABLET ORAL at 09:29

## 2021-11-27 RX ADMIN — FAMOTIDINE 20 MG: 20 TABLET, FILM COATED ORAL at 08:40

## 2021-11-27 RX ADMIN — ALLOPURINOL 300 MG: 300 TABLET ORAL at 08:40

## 2021-11-27 RX ADMIN — SPIRONOLACTONE 25 MG: 25 TABLET ORAL at 12:57

## 2021-11-27 RX ADMIN — SULFAMETHOXAZOLE AND TRIMETHOPRIM 1 TABLET: 800; 160 TABLET ORAL at 21:47

## 2021-11-27 RX ADMIN — POTASSIUM CHLORIDE 20 MEQ: 750 CAPSULE, EXTENDED RELEASE ORAL at 08:40

## 2021-11-27 RX ADMIN — POTASSIUM CHLORIDE 20 MEQ: 750 CAPSULE, EXTENDED RELEASE ORAL at 12:10

## 2021-11-27 RX ADMIN — FUROSEMIDE 20 MG: 20 TABLET ORAL at 18:16

## 2021-11-27 RX ADMIN — FAMOTIDINE 20 MG: 20 TABLET, FILM COATED ORAL at 21:47

## 2021-11-27 RX ADMIN — DEXAMETHASONE 4 MG: 4 TABLET ORAL at 08:40

## 2021-11-27 RX ADMIN — LOSARTAN POTASSIUM 25 MG: 25 TABLET, FILM COATED ORAL at 12:57

## 2021-11-27 RX ADMIN — SULFAMETHOXAZOLE AND TRIMETHOPRIM 1 TABLET: 800; 160 TABLET ORAL at 08:40

## 2021-11-27 RX ADMIN — DIGOXIN 250 MCG: 0.25 INJECTION INTRAMUSCULAR; INTRAVENOUS at 12:57

## 2021-11-27 RX ADMIN — FOLIC ACID 1 MG: 1 TABLET ORAL at 08:40

## 2021-11-28 ENCOUNTER — APPOINTMENT (OUTPATIENT)
Dept: INTERVENTIONAL RADIOLOGY/VASCULAR | Facility: HOSPITAL | Age: 56
End: 2021-11-28

## 2021-11-28 ENCOUNTER — APPOINTMENT (OUTPATIENT)
Dept: GENERAL RADIOLOGY | Facility: HOSPITAL | Age: 56
End: 2021-11-28

## 2021-11-28 LAB
ALBUMIN SERPL-MCNC: 3.7 G/DL (ref 3.5–5.2)
ALBUMIN/GLOB SERPL: 1.9 G/DL
ALP SERPL-CCNC: 173 U/L (ref 39–117)
ALT SERPL W P-5'-P-CCNC: 37 U/L (ref 1–33)
ANION GAP SERPL CALCULATED.3IONS-SCNC: 13.3 MMOL/L (ref 5–15)
ANISOCYTOSIS BLD QL: ABNORMAL
AST SERPL-CCNC: 59 U/L (ref 1–32)
BACTERIA BLD CULT: ABNORMAL
BILIRUB SERPL-MCNC: 0.9 MG/DL (ref 0–1.2)
BUN SERPL-MCNC: 18 MG/DL (ref 6–20)
BUN/CREAT SERPL: 19.1 (ref 7–25)
CALCIUM SPEC-SCNC: 8.7 MG/DL (ref 8.6–10.5)
CHLORIDE SERPL-SCNC: 100 MMOL/L (ref 98–107)
CO2 SERPL-SCNC: 24.7 MMOL/L (ref 22–29)
CREAT SERPL-MCNC: 0.94 MG/DL (ref 0.57–1)
CRYOGLOB SER QL: ABNORMAL
CRYPTOC AG CSF QL: NEGATIVE
CRYPTOC AG CSF QL: POSITIVE
DEPRECATED RDW RBC AUTO: 61.5 FL (ref 37–54)
ERYTHROCYTE [DISTWIDTH] IN BLOOD BY AUTOMATED COUNT: 21.2 % (ref 12.3–15.4)
GFR SERPL CREATININE-BSD FRML MDRD: 74 ML/MIN/1.73
GLOBULIN UR ELPH-MCNC: 2 GM/DL
GLUCOSE SERPL-MCNC: 81 MG/DL (ref 65–99)
HCT VFR BLD AUTO: 21.7 % (ref 34–46.6)
HGB BLD-MCNC: 7.5 G/DL (ref 12–15.9)
INR PPP: 1.2 (ref 2–3)
LYMPHOCYTES # BLD MANUAL: 0.86 10*3/MM3 (ref 0.7–3.1)
LYMPHOCYTES NFR BLD MANUAL: 22 % (ref 5–12)
MAGNESIUM SERPL-MCNC: 1.4 MG/DL (ref 1.6–2.6)
MCH RBC QN AUTO: 31.9 PG (ref 26.6–33)
MCHC RBC AUTO-ENTMCNC: 34.6 G/DL (ref 31.5–35.7)
MCV RBC AUTO: 92.3 FL (ref 79–97)
MONOCYTES # BLD: 2.71 10*3/MM3 (ref 0.1–0.9)
NEUTROPHILS # BLD AUTO: 8.76 10*3/MM3 (ref 1.7–7)
NEUTROPHILS NFR BLD MANUAL: 70 % (ref 42.7–76)
NEUTS BAND NFR BLD MANUAL: 1 % (ref 0–5)
NT-PROBNP SERPL-MCNC: 5533 PG/ML (ref 0–900)
PLATELET # BLD AUTO: 67 10*3/MM3 (ref 140–450)
PMV BLD AUTO: 11.6 FL (ref 6–12)
POTASSIUM SERPL-SCNC: 3.4 MMOL/L (ref 3.5–5.2)
PROT CSF-MCNC: 27.8 MG/DL (ref 15–45)
PROT SERPL-MCNC: 5.7 G/DL (ref 6–8.5)
PROTHROMBIN TIME: 12.2 SECONDS (ref 9.4–12)
RBC # BLD AUTO: 2.35 10*6/MM3 (ref 3.77–5.28)
SCAN SLIDE: NORMAL
SMALL PLATELETS BLD QL SMEAR: ABNORMAL
SODIUM SERPL-SCNC: 138 MMOL/L (ref 136–145)
VARIANT LYMPHS NFR BLD MANUAL: 7 % (ref 19.6–45.3)
WBC MORPH BLD: NORMAL
WBC NRBC COR # BLD: 12.34 10*3/MM3 (ref 3.4–10.8)

## 2021-11-28 PROCEDURE — 85007 BL SMEAR W/DIFF WBC COUNT: CPT | Performed by: INTERNAL MEDICINE

## 2021-11-28 PROCEDURE — 82042 OTHER SOURCE ALBUMIN QUAN EA: CPT | Performed by: INTERNAL MEDICINE

## 2021-11-28 PROCEDURE — 85610 PROTHROMBIN TIME: CPT | Performed by: INTERNAL MEDICINE

## 2021-11-28 PROCEDURE — 87899 AGENT NOS ASSAY W/OPTIC: CPT | Performed by: INTERNAL MEDICINE

## 2021-11-28 PROCEDURE — 76000 FLUOROSCOPY <1 HR PHYS/QHP: CPT

## 2021-11-28 PROCEDURE — 86255 FLUORESCENT ANTIBODY SCREEN: CPT | Performed by: INTERNAL MEDICINE

## 2021-11-28 PROCEDURE — 82438 ASSAY OTHER FLUID CHLORIDES: CPT | Performed by: INTERNAL MEDICINE

## 2021-11-28 PROCEDURE — 85025 COMPLETE CBC W/AUTO DIFF WBC: CPT | Performed by: INTERNAL MEDICINE

## 2021-11-28 PROCEDURE — 86694 HERPES SIMPLEX NES ANTBDY: CPT | Performed by: INTERNAL MEDICINE

## 2021-11-28 PROCEDURE — 25010000002 DIGOXIN PER 500 MCG: Performed by: SPECIALIST

## 2021-11-28 PROCEDURE — 71045 X-RAY EXAM CHEST 1 VIEW: CPT

## 2021-11-28 PROCEDURE — 87385 HISTOPLASMA CAPSUL AG IA: CPT | Performed by: INTERNAL MEDICINE

## 2021-11-28 PROCEDURE — 84157 ASSAY OF PROTEIN OTHER: CPT | Performed by: INTERNAL MEDICINE

## 2021-11-28 PROCEDURE — 009U3ZX DRAINAGE OF SPINAL CANAL, PERCUTANEOUS APPROACH, DIAGNOSTIC: ICD-10-PCS | Performed by: RADIOLOGY

## 2021-11-28 PROCEDURE — 86592 SYPHILIS TEST NON-TREP QUAL: CPT | Performed by: INTERNAL MEDICINE

## 2021-11-28 PROCEDURE — 25010000002 AMPHOTERICIN B LIPOSOME PER 10 MG

## 2021-11-28 PROCEDURE — 80053 COMPREHEN METABOLIC PANEL: CPT | Performed by: INTERNAL MEDICINE

## 2021-11-28 PROCEDURE — 83880 ASSAY OF NATRIURETIC PEPTIDE: CPT | Performed by: INTERNAL MEDICINE

## 2021-11-28 PROCEDURE — 82784 ASSAY IGA/IGD/IGG/IGM EACH: CPT | Performed by: INTERNAL MEDICINE

## 2021-11-28 PROCEDURE — 83735 ASSAY OF MAGNESIUM: CPT | Performed by: INTERNAL MEDICINE

## 2021-11-28 PROCEDURE — 63710000001 DEXAMETHASONE PER 0.25 MG: Performed by: INTERNAL MEDICINE

## 2021-11-28 RX ORDER — LIDOCAINE HYDROCHLORIDE 20 MG/ML
INJECTION, SOLUTION INFILTRATION; PERINEURAL
Status: COMPLETED
Start: 2021-11-28 | End: 2021-11-28

## 2021-11-28 RX ORDER — FLUCYTOSINE 500 MG/1
1500 CAPSULE ORAL 4 TIMES DAILY
Status: DISCONTINUED | OUTPATIENT
Start: 2021-11-28 | End: 2021-12-01 | Stop reason: HOSPADM

## 2021-11-28 RX ORDER — FLUCYTOSINE 500 MG/1
1500 CAPSULE ORAL EVERY 6 HOURS SCHEDULED
Status: DISCONTINUED | OUTPATIENT
Start: 2021-11-28 | End: 2021-11-28

## 2021-11-28 RX ORDER — DIGOXIN 0.25 MG/ML
250 INJECTION INTRAMUSCULAR; INTRAVENOUS
Status: DISCONTINUED | OUTPATIENT
Start: 2021-11-28 | End: 2021-12-01 | Stop reason: HOSPADM

## 2021-11-28 RX ORDER — CARVEDILOL 3.12 MG/1
3.12 TABLET ORAL 2 TIMES DAILY WITH MEALS
Status: DISCONTINUED | OUTPATIENT
Start: 2021-11-28 | End: 2021-11-30

## 2021-11-28 RX ADMIN — SULFAMETHOXAZOLE AND TRIMETHOPRIM 1 TABLET: 800; 160 TABLET ORAL at 08:53

## 2021-11-28 RX ADMIN — FUROSEMIDE 20 MG: 20 TABLET ORAL at 09:55

## 2021-11-28 RX ADMIN — FUROSEMIDE 20 MG: 20 TABLET ORAL at 20:43

## 2021-11-28 RX ADMIN — FLUCYTOSINE 1500 MG: 500 CAPSULE ORAL at 20:43

## 2021-11-28 RX ADMIN — FLUCYTOSINE 1500 MG: 500 CAPSULE ORAL at 18:26

## 2021-11-28 RX ADMIN — AMPHOTERICIN B 200 MG: 50 INJECTION, POWDER, LYOPHILIZED, FOR SOLUTION INTRAVENOUS at 15:40

## 2021-11-28 RX ADMIN — FOLIC ACID 1 MG: 1 TABLET ORAL at 08:53

## 2021-11-28 RX ADMIN — SPIRONOLACTONE 25 MG: 25 TABLET ORAL at 08:53

## 2021-11-28 RX ADMIN — ALLOPURINOL 300 MG: 300 TABLET ORAL at 08:53

## 2021-11-28 RX ADMIN — LIDOCAINE HYDROCHLORIDE 10 ML: 20 INJECTION, SOLUTION INFILTRATION; PERINEURAL at 14:50

## 2021-11-28 RX ADMIN — FAMOTIDINE 20 MG: 20 TABLET, FILM COATED ORAL at 20:43

## 2021-11-28 RX ADMIN — FAMOTIDINE 20 MG: 20 TABLET, FILM COATED ORAL at 08:53

## 2021-11-28 RX ADMIN — SULFAMETHOXAZOLE AND TRIMETHOPRIM 1 TABLET: 800; 160 TABLET ORAL at 20:44

## 2021-11-28 RX ADMIN — LOSARTAN POTASSIUM 25 MG: 25 TABLET, FILM COATED ORAL at 08:53

## 2021-11-28 RX ADMIN — DEXAMETHASONE 4 MG: 4 TABLET ORAL at 08:53

## 2021-11-28 RX ADMIN — CARVEDILOL 3.12 MG: 3.12 TABLET, FILM COATED ORAL at 18:25

## 2021-11-28 RX ADMIN — DIGOXIN 250 MCG: 0.25 INJECTION INTRAMUSCULAR; INTRAVENOUS at 12:35

## 2021-11-29 PROBLEM — B49 FUNGEMIA: Status: ACTIVE | Noted: 2021-11-29

## 2021-11-29 PROCEDURE — 25010000002 DIGOXIN PER 500 MCG: Performed by: SPECIALIST

## 2021-11-29 PROCEDURE — 63710000001 DEXAMETHASONE PER 0.25 MG: Performed by: INTERNAL MEDICINE

## 2021-11-29 PROCEDURE — 25010000002 AMPHOTERICIN B LIPOSOME PER 10 MG: Performed by: INTERNAL MEDICINE

## 2021-11-29 RX ADMIN — LOSARTAN POTASSIUM 25 MG: 25 TABLET, FILM COATED ORAL at 09:03

## 2021-11-29 RX ADMIN — AMPHOTERICIN B 200 MG: 50 INJECTION, POWDER, LYOPHILIZED, FOR SOLUTION INTRAVENOUS at 17:16

## 2021-11-29 RX ADMIN — CARVEDILOL 3.12 MG: 3.12 TABLET, FILM COATED ORAL at 17:17

## 2021-11-29 RX ADMIN — FLUCYTOSINE 1500 MG: 500 CAPSULE ORAL at 09:03

## 2021-11-29 RX ADMIN — DIGOXIN 250 MCG: 0.25 INJECTION INTRAMUSCULAR; INTRAVENOUS at 12:41

## 2021-11-29 RX ADMIN — ALLOPURINOL 300 MG: 300 TABLET ORAL at 09:03

## 2021-11-29 RX ADMIN — FUROSEMIDE 20 MG: 20 TABLET ORAL at 17:17

## 2021-11-29 RX ADMIN — SODIUM CHLORIDE, PRESERVATIVE FREE 10 ML: 5 INJECTION INTRAVENOUS at 09:01

## 2021-11-29 RX ADMIN — FAMOTIDINE 20 MG: 20 TABLET, FILM COATED ORAL at 09:03

## 2021-11-29 RX ADMIN — FUROSEMIDE 20 MG: 20 TABLET ORAL at 10:48

## 2021-11-29 RX ADMIN — FAMOTIDINE 20 MG: 20 TABLET, FILM COATED ORAL at 21:15

## 2021-11-29 RX ADMIN — SPIRONOLACTONE 25 MG: 25 TABLET ORAL at 09:02

## 2021-11-29 RX ADMIN — DEXAMETHASONE 4 MG: 4 TABLET ORAL at 09:03

## 2021-11-29 RX ADMIN — FLUCYTOSINE 1500 MG: 500 CAPSULE ORAL at 17:17

## 2021-11-29 RX ADMIN — FLUCYTOSINE 1500 MG: 500 CAPSULE ORAL at 12:41

## 2021-11-29 RX ADMIN — CARVEDILOL 3.12 MG: 3.12 TABLET, FILM COATED ORAL at 09:02

## 2021-11-29 RX ADMIN — FLUCYTOSINE 1500 MG: 500 CAPSULE ORAL at 21:15

## 2021-11-29 RX ADMIN — FOLIC ACID 1 MG: 1 TABLET ORAL at 09:03

## 2021-11-30 ENCOUNTER — APPOINTMENT (OUTPATIENT)
Dept: GENERAL RADIOLOGY | Facility: HOSPITAL | Age: 56
End: 2021-11-30

## 2021-11-30 DIAGNOSIS — B49 FUNGEMIA: Primary | ICD-10-CM

## 2021-11-30 LAB
ALBUMIN CSF-MCNC: 14 MG/DL (ref 8–37)
ALBUMIN SERPL-MCNC: 3.8 G/DL (ref 3.5–5.2)
ALBUMIN/GLOB SERPL: 1.7 G/DL
ALP SERPL-CCNC: 243 U/L (ref 39–117)
ALT SERPL W P-5'-P-CCNC: 71 U/L (ref 1–33)
ANION GAP SERPL CALCULATED.3IONS-SCNC: 13.3 MMOL/L (ref 5–15)
AST SERPL-CCNC: 75 U/L (ref 1–32)
BASOPHILS # BLD AUTO: 0.02 10*3/MM3 (ref 0–0.2)
BASOPHILS NFR BLD AUTO: 0.1 % (ref 0–1.5)
BILIRUB SERPL-MCNC: 0.8 MG/DL (ref 0–1.2)
BUN SERPL-MCNC: 23 MG/DL (ref 6–20)
BUN/CREAT SERPL: 22.8 (ref 7–25)
CALCIUM SPEC-SCNC: 9.5 MG/DL (ref 8.6–10.5)
CHLORIDE CSF-SCNC: 122 MMOL/L (ref 118–131)
CHLORIDE SERPL-SCNC: 101 MMOL/L (ref 98–107)
CO2 SERPL-SCNC: 20.7 MMOL/L (ref 22–29)
CREAT SERPL-MCNC: 1.01 MG/DL (ref 0.57–1)
DEPRECATED RDW RBC AUTO: 69.5 FL (ref 37–54)
EOSINOPHIL # BLD AUTO: 0.01 10*3/MM3 (ref 0–0.4)
EOSINOPHIL NFR BLD AUTO: 0.1 % (ref 0.3–6.2)
ERYTHROCYTE [DISTWIDTH] IN BLOOD BY AUTOMATED COUNT: 22 % (ref 12.3–15.4)
GFR SERPL CREATININE-BSD FRML MDRD: 68 ML/MIN/1.73
GLOBULIN UR ELPH-MCNC: 2.3 GM/DL
GLUCOSE SERPL-MCNC: 83 MG/DL (ref 65–99)
HCT VFR BLD AUTO: 23.6 % (ref 34–46.6)
HGB BLD-MCNC: 8.1 G/DL (ref 12–15.9)
IGA CSF-MCNC: 0.04 MG/DL (ref 0–0.78)
IGM CSF-MCNC: <0.01 MG/DL (ref 0.01–0.13)
IMM GRANULOCYTES # BLD AUTO: 0.1 10*3/MM3 (ref 0–0.05)
IMM GRANULOCYTES NFR BLD AUTO: 0.7 % (ref 0–0.5)
LARGE PLATELETS: NORMAL
LYMPHOCYTES # BLD AUTO: 2.3 10*3/MM3 (ref 0.7–3.1)
LYMPHOCYTES NFR BLD AUTO: 15.4 % (ref 19.6–45.3)
MAGNESIUM SERPL-MCNC: 1.3 MG/DL (ref 1.6–2.6)
MCH RBC QN AUTO: 32.8 PG (ref 26.6–33)
MCHC RBC AUTO-ENTMCNC: 34.3 G/DL (ref 31.5–35.7)
MCV RBC AUTO: 95.5 FL (ref 79–97)
MONOCYTES # BLD AUTO: 5.33 10*3/MM3 (ref 0.1–0.9)
MONOCYTES NFR BLD AUTO: 35.8 % (ref 5–12)
NEUTROPHILS NFR BLD AUTO: 47.9 % (ref 42.7–76)
NEUTROPHILS NFR BLD AUTO: 7.13 10*3/MM3 (ref 1.7–7)
NRBC BLD AUTO-RTO: 0 /100 WBC (ref 0–0.2)
PLATELET # BLD AUTO: 83 10*3/MM3 (ref 140–450)
PMV BLD AUTO: 11.4 FL (ref 6–12)
POTASSIUM SERPL-SCNC: 3.5 MMOL/L (ref 3.5–5.2)
PROT SERPL-MCNC: 6.1 G/DL (ref 6–8.5)
RBC # BLD AUTO: 2.47 10*6/MM3 (ref 3.77–5.28)
RBC MORPH BLD: NORMAL
SMALL PLATELETS BLD QL SMEAR: NORMAL
SODIUM SERPL-SCNC: 135 MMOL/L (ref 136–145)
WBC MORPH BLD: NORMAL
WBC NRBC COR # BLD: 14.89 10*3/MM3 (ref 3.4–10.8)

## 2021-11-30 PROCEDURE — 25010000002 AMPHOTERICIN B LIPOSOME PER 10 MG: Performed by: INTERNAL MEDICINE

## 2021-11-30 PROCEDURE — 63710000001 DEXAMETHASONE PER 0.25 MG: Performed by: INTERNAL MEDICINE

## 2021-11-30 PROCEDURE — 85025 COMPLETE CBC W/AUTO DIFF WBC: CPT | Performed by: INTERNAL MEDICINE

## 2021-11-30 PROCEDURE — 25010000002 MAGNESIUM SULFATE IN D5W 1G/100ML (PREMIX) 1-5 GM/100ML-% SOLUTION: Performed by: INTERNAL MEDICINE

## 2021-11-30 PROCEDURE — 83735 ASSAY OF MAGNESIUM: CPT | Performed by: INTERNAL MEDICINE

## 2021-11-30 PROCEDURE — 25010000002 DIGOXIN PER 500 MCG: Performed by: SPECIALIST

## 2021-11-30 PROCEDURE — 80053 COMPREHEN METABOLIC PANEL: CPT | Performed by: INTERNAL MEDICINE

## 2021-11-30 PROCEDURE — 71045 X-RAY EXAM CHEST 1 VIEW: CPT

## 2021-11-30 PROCEDURE — 85007 BL SMEAR W/DIFF WBC COUNT: CPT | Performed by: INTERNAL MEDICINE

## 2021-11-30 RX ORDER — CARVEDILOL 3.12 MG/1
3.12 TABLET ORAL ONCE
Status: COMPLETED | OUTPATIENT
Start: 2021-11-30 | End: 2021-11-30

## 2021-11-30 RX ORDER — HEPARIN SODIUM (PORCINE) LOCK FLUSH IV SOLN 100 UNIT/ML 100 UNIT/ML
500 SOLUTION INTRAVENOUS DAILY PRN
Status: DISCONTINUED | OUTPATIENT
Start: 2021-12-01 | End: 2021-12-01 | Stop reason: HOSPADM

## 2021-11-30 RX ORDER — CARVEDILOL 6.25 MG/1
6.25 TABLET ORAL 2 TIMES DAILY WITH MEALS
Status: DISCONTINUED | OUTPATIENT
Start: 2021-11-30 | End: 2021-12-01 | Stop reason: HOSPADM

## 2021-11-30 RX ORDER — MAGNESIUM SULFATE 1 G/100ML
1 INJECTION INTRAVENOUS
Status: COMPLETED | OUTPATIENT
Start: 2021-11-30 | End: 2021-11-30

## 2021-11-30 RX ORDER — SODIUM CHLORIDE 9 MG/ML
250 INJECTION, SOLUTION INTRAVENOUS ONCE
Status: CANCELLED | OUTPATIENT
Start: 2022-11-01

## 2021-11-30 RX ADMIN — MAGNESIUM SULFATE 1 G: 1 INJECTION INTRAVENOUS at 20:23

## 2021-11-30 RX ADMIN — FLUCYTOSINE 1500 MG: 500 CAPSULE ORAL at 17:33

## 2021-11-30 RX ADMIN — FUROSEMIDE 20 MG: 20 TABLET ORAL at 09:41

## 2021-11-30 RX ADMIN — FOLIC ACID 1 MG: 1 TABLET ORAL at 09:40

## 2021-11-30 RX ADMIN — DIGOXIN 250 MCG: 0.25 INJECTION INTRAMUSCULAR; INTRAVENOUS at 11:22

## 2021-11-30 RX ADMIN — MAGNESIUM SULFATE 1 G: 1 INJECTION INTRAVENOUS at 20:21

## 2021-11-30 RX ADMIN — SPIRONOLACTONE 25 MG: 25 TABLET ORAL at 09:41

## 2021-11-30 RX ADMIN — FAMOTIDINE 20 MG: 20 TABLET, FILM COATED ORAL at 09:40

## 2021-11-30 RX ADMIN — LOSARTAN POTASSIUM 25 MG: 25 TABLET, FILM COATED ORAL at 09:41

## 2021-11-30 RX ADMIN — ALLOPURINOL 300 MG: 300 TABLET ORAL at 09:41

## 2021-11-30 RX ADMIN — FLUCYTOSINE 1500 MG: 500 CAPSULE ORAL at 09:40

## 2021-11-30 RX ADMIN — FUROSEMIDE 20 MG: 20 TABLET ORAL at 17:33

## 2021-11-30 RX ADMIN — DEXAMETHASONE 4 MG: 4 TABLET ORAL at 09:40

## 2021-11-30 RX ADMIN — FLUCYTOSINE 1500 MG: 500 CAPSULE ORAL at 11:21

## 2021-11-30 RX ADMIN — SODIUM CHLORIDE, PRESERVATIVE FREE 10 ML: 5 INJECTION INTRAVENOUS at 11:21

## 2021-11-30 RX ADMIN — CARVEDILOL 6.25 MG: 6.25 TABLET, FILM COATED ORAL at 17:33

## 2021-11-30 RX ADMIN — SODIUM CHLORIDE, PRESERVATIVE FREE 10 ML: 5 INJECTION INTRAVENOUS at 09:41

## 2021-11-30 RX ADMIN — AMPHOTERICIN B 200 MG: 50 INJECTION, POWDER, LYOPHILIZED, FOR SOLUTION INTRAVENOUS at 16:36

## 2021-11-30 RX ADMIN — FLUCYTOSINE 1500 MG: 500 CAPSULE ORAL at 20:22

## 2021-11-30 RX ADMIN — FAMOTIDINE 20 MG: 20 TABLET, FILM COATED ORAL at 20:22

## 2021-11-30 RX ADMIN — CARVEDILOL 3.12 MG: 3.12 TABLET, FILM COATED ORAL at 11:20

## 2021-11-30 RX ADMIN — CARVEDILOL 3.12 MG: 3.12 TABLET, FILM COATED ORAL at 09:41

## 2021-12-01 ENCOUNTER — APPOINTMENT (OUTPATIENT)
Dept: NUCLEAR MEDICINE | Facility: HOSPITAL | Age: 56
End: 2021-12-01

## 2021-12-01 VITALS
OXYGEN SATURATION: 100 % | RESPIRATION RATE: 16 BRPM | TEMPERATURE: 97.9 F | BODY MASS INDEX: 21.01 KG/M2 | HEIGHT: 66 IN | DIASTOLIC BLOOD PRESSURE: 44 MMHG | SYSTOLIC BLOOD PRESSURE: 102 MMHG | WEIGHT: 130.73 LBS | HEART RATE: 63 BPM

## 2021-12-01 LAB
BACTERIA SPEC AEROBE CULT: ABNORMAL
GRAM STN SPEC: ABNORMAL
REAGIN AB CSF QL: NON REACTIVE

## 2021-12-01 PROCEDURE — 78452 HT MUSCLE IMAGE SPECT MULT: CPT

## 2021-12-01 PROCEDURE — 25010000002 AMPHOTERICIN B LIPOSOME PER 10 MG: Performed by: INTERNAL MEDICINE

## 2021-12-01 PROCEDURE — A9502 TC99M TETROFOSMIN: HCPCS | Performed by: INTERNAL MEDICINE

## 2021-12-01 PROCEDURE — 63710000001 DEXAMETHASONE PER 0.25 MG: Performed by: INTERNAL MEDICINE

## 2021-12-01 PROCEDURE — 25010000002 REGADENOSON 0.4 MG/5ML SOLUTION: Performed by: INTERNAL MEDICINE

## 2021-12-01 PROCEDURE — 0 TECHNETIUM TETROFOSMIN KIT: Performed by: INTERNAL MEDICINE

## 2021-12-01 PROCEDURE — 93017 CV STRESS TEST TRACING ONLY: CPT

## 2021-12-01 PROCEDURE — 25010000002 DIGOXIN PER 500 MCG: Performed by: SPECIALIST

## 2021-12-01 PROCEDURE — 25010000002 HEPARIN LOCK FLUSH PER 10 UNITS: Performed by: INTERNAL MEDICINE

## 2021-12-01 RX ORDER — CARVEDILOL 6.25 MG/1
6.25 TABLET ORAL 2 TIMES DAILY WITH MEALS
Qty: 60 TABLET | Refills: 0 | Status: SHIPPED | OUTPATIENT
Start: 2021-12-01 | End: 2021-12-31

## 2021-12-01 RX ORDER — LOSARTAN POTASSIUM 25 MG/1
25 TABLET ORAL
Qty: 30 TABLET | Refills: 0 | Status: SHIPPED | OUTPATIENT
Start: 2021-12-02 | End: 2022-01-01

## 2021-12-01 RX ORDER — SPIRONOLACTONE 25 MG/1
25 TABLET ORAL DAILY
Qty: 30 TABLET | Refills: 0 | Status: SHIPPED | OUTPATIENT
Start: 2021-12-02 | End: 2022-01-01

## 2021-12-01 RX ORDER — ONDANSETRON 4 MG/1
4 TABLET, ORALLY DISINTEGRATING ORAL EVERY 8 HOURS PRN
Qty: 30 TABLET | Refills: 1 | Status: SHIPPED | OUTPATIENT
Start: 2021-12-01 | End: 2021-12-11

## 2021-12-01 RX ORDER — FLUCYTOSINE 500 MG/1
1500 CAPSULE ORAL 4 TIMES DAILY
Qty: 100 CAPSULE | Refills: 0 | Status: SHIPPED | OUTPATIENT
Start: 2021-12-01 | End: 2021-12-10

## 2021-12-01 RX ADMIN — DIGOXIN 250 MCG: 0.25 INJECTION INTRAMUSCULAR; INTRAVENOUS at 11:31

## 2021-12-01 RX ADMIN — REGADENOSON 0.4 MG: 0.08 INJECTION, SOLUTION INTRAVENOUS at 09:47

## 2021-12-01 RX ADMIN — TETROFOSMIN 1 DOSE: 1.38 INJECTION, POWDER, LYOPHILIZED, FOR SOLUTION INTRAVENOUS at 09:47

## 2021-12-01 RX ADMIN — ALLOPURINOL 300 MG: 300 TABLET ORAL at 11:29

## 2021-12-01 RX ADMIN — LOSARTAN POTASSIUM 25 MG: 25 TABLET, FILM COATED ORAL at 11:28

## 2021-12-01 RX ADMIN — CARVEDILOL 6.25 MG: 6.25 TABLET, FILM COATED ORAL at 11:30

## 2021-12-01 RX ADMIN — FLUCYTOSINE 1500 MG: 500 CAPSULE ORAL at 11:30

## 2021-12-01 RX ADMIN — DEXAMETHASONE 4 MG: 4 TABLET ORAL at 11:29

## 2021-12-01 RX ADMIN — HEPARIN SODIUM (PORCINE) LOCK FLUSH IV SOLN 100 UNIT/ML 500 UNITS: 100 SOLUTION at 16:06

## 2021-12-01 RX ADMIN — AMPHOTERICIN B 200 MG: 50 INJECTION, POWDER, LYOPHILIZED, FOR SOLUTION INTRAVENOUS at 14:45

## 2021-12-01 RX ADMIN — SPIRONOLACTONE 25 MG: 25 TABLET ORAL at 11:28

## 2021-12-01 RX ADMIN — FUROSEMIDE 20 MG: 20 TABLET ORAL at 11:29

## 2021-12-01 RX ADMIN — TETROFOSMIN 1 DOSE: 1.38 INJECTION, POWDER, LYOPHILIZED, FOR SOLUTION INTRAVENOUS at 07:45

## 2021-12-01 NOTE — PROGRESS NOTES
Date of service: 11/30/2021    Subjective:  No edema, shortness of breath, chest pain or palpitations    Review of systems: Negative for headaches, tinnitus, vertigo, nausea, vomiting, abdominal pain, dysuria, hematuria, TIA or CVA-like symptoms    Physical examination: She was in no pain or respiratory distress.    Vital signs: Reviewed  HEENT: Sclerae nonicteric  Neck: No JVD or carotid bruit  Chest: Clear to auscultation no wheeze or rales.  Cardiac: RRR. No murmurs, rub or gallops  Abdomen: Soft and nontender. No megalies  Extremities: No edema cyanosis or clubbing.  Pulses intact.  Neuro: Alert, oriented, no facial droop speech was clear    Records: Reviewed    Assessment and plan:    1.  Acute systolic congestive heart failure with moderate reduction of the LV systolic function.  This may be drug-induced (chemotherapy).  She had a normal LV systolic function via echocardiography in July 2021.  2.  Abnormal EKG that indicate anterolateral ischemia.  3.  Hypertension  4.  Nuclear stress test in the morning to rule out myocardial ischemia.  6.  Maximize medical therapy.  Will change Coreg to 6.25 mg p.o. twice daily.  Continue with other medications.  7.  Cryptococcus bacteremia.   8.  Lexiscan nuclear stress test today

## 2021-12-01 NOTE — DISCHARGE SUMMARY
[facility]         DISCHARGE SUMMARY    Patient Name: Abiola Garibay  : 1965  MRN: [id number]    Date of Admission: 2021  Date of Discharge:   Primary Care Physician: Jessi Ennis APRN    Consults     Date and Time Order Name Status Description    2021 10:37 AM Inpatient Pulmonology Consult      2021 12:43 PM Inpatient Cardiology Consult            Presenting Problem:   Shortness of breath [R06.02]  Cough [R05.9]  CHF (congestive heart failure) (HCC) [I50.9]  Pneumonia of both lungs due to infectious organism, unspecified part of lung [J18.9]    Active and Resolved Hospital Problems:  Active Hospital Problems    Diagnosis POA   • Fungemia [B49] Yes   • CHF (congestive heart failure) (HCC) [I50.9] Yes      Resolved Hospital Problems   No resolved problems to display.         Hospital Course     Hospital Course:  Abiola Garibay is a 56 y.o. female admitted on  with shortness of breath.  Patient was diagnosed with congestive heart failure, diuretics were initiated.  According to historyPatient is a 56-year-old woman, she came to the emergency department with complaints of shortness of breath  Shortness of breath started last night while suddenly and it quickly progressed and the patient was unable to rest secondary to the severity of the shortness of breath.  It occurred at rest and  was worse on laying down the patient felt better when she was in the sitting position.  The shortness of breath was associated with cough which is productive of white mucus.  She did not complain of fever, chills, headache or malaise.  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  She states that an earlier round of chemotherapy had damaged her kidneys she received CRRT and now the kidney function has recovered.  Since then she has noticed swelling of her feet which continues to persist, Lasix was prescribed with only minimal improvement in the swelling.  Until last night she did not experience shortness of breath on exertion moderately severe fatigue  Patient had a 2D echocardiogram in July of this year and it was unremarkable.  Evaluation in the emergency department included D-dimer measurement which was high and this prompted CT angiogram of the chest  for evaluation of pulmonary embolism.  There was no evidence of pulmonary embolism but the CT scan demonstrated bilateral groundglass opacities and a small pleural effusion and was read by the radiologist as multifocal pneumonia or pulmonary edema.  Patient was started on IV vancomycin and cefepime in the emergency department.  Patient was continued on diuretics pulmonologist and cardiologist were consulted.  Her symptoms improved.  Her cultures grew cryptococcus neoformans.  Patient was being followed by Dr. Naqvi during that time and he consulted with oncologist Dr. Soto at  who was treating patient..  Patient was recommended to transfer to  but she refused and she wanted to get treated here.  After discussion with the  Medical Center it was recommended that she had a lumbar puncture and start antifungal treatment with amphotericin B and flucytosine.  Condition was to continue treatment for at least 2 weeks.  Lumbar  puncture was performed and studies were negative for infection.  Patient was feeling much better.  She had cardiac work-up including a stress test which was negative, echo showed LV dysfunction probably related to chemotherapy and toxicity..  Patient wanted to go home and have outpatient treatment, patient was set up for outpatient IV  amphotericinB along with oral flucytosine.  She will come to outpatient center for amphotericin B, pharmacy was notified.  Patient advised to follow-up with her oncology team, PCP and other specialists as recommended.      DISCHARGE Follow Up Recommendations for labs and diagnostics:   Discharged home with outpatient IV therapy for fungemia.  Low-salt diet.  Activity as tolerated.  Follow-up with PCP, Tierra Genao as well as consultants and oncology team at [facility]      Day of Discharge     Vital Signs:  Temp:  [97.7 °F (36.5 °C)-98.2 °F (36.8 °C)] 97.9 °F (36.6 °C)  Heart Rate:  [60-72] 72  Resp:  [16-18] 16  BP: ()/(42-56) 109/44    Physical Exam:    Elderly female not in acute distress.  Heart regular   lungs clear.  Abdomen soft  Extremities no edema  Neurologically awake alert and oriented    Pertinent  and/or Most Recent Results     LAB RESULTS:      Lab 11/30/21  0506 11/28/21 0441 11/26/21 0451 11/25/21  0535 11/24/21  1519   WBC 14.89* 12.34* 18.88*  --   --    HEMOGLOBIN 8.1* 7.5* 7.5*  --   --    HEMATOCRIT 23.6* 21.7* 21.8*  --   --    PLATELETS 83* 67* 55*  --   --    NEUTROS ABS 7.13* 8.76* 15.67*  --   --    IMMATURE GRANS (ABS) 0.10*  --   --   --   --    LYMPHS ABS 2.30  --   --   --   --    MONOS ABS 5.33*  --   --   --   --    EOS ABS 0.01  --   --   --   --    MCV 95.5 92.3 92.4  --   --    PROCALCITONIN  --   --   --  0.12  --    LACTATE  --   --   --  1.7 2.4*   LDH  --   --   --  593*  --    PROTIME  --  12.2*  --   --   --          Lab 11/30/21  0506 11/28/21 0441 11/27/21  1218 11/26/21  0451 11/25/21  0535   SODIUM 135* 138 137 138 140   POTASSIUM 3.5 3.4* 3.2* 3.1* 3.4*   CHLORIDE 101 100 96* 100 100   CO2 20.7* 24.7 25.4 25.0 26.5   ANION GAP 13.3 13.3 15.6* 13.0 13.5   BUN 23* 18 19 21* 20   CREATININE 1.01* 0.94 0.96 1.12* 1.19*   GLUCOSE 83 81 115* 114* 101*   CALCIUM 9.5 8.7 9.2 8.6 9.4   MAGNESIUM 1.3* 1.4*  --  1.1*  --          Lab 11/30/21  0506 11/28/21  0441 11/26/21  0451  11/25/21  0535   TOTAL PROTEIN 6.1 5.7* 5.4* 5.6*   ALBUMIN 3.80 3.70 3.50 3.60   GLOBULIN 2.3 2.0 1.9 2.0   ALT (SGPT) 71* 37* 21 22   AST (SGOT) 75* 59* 33* 34*   BILIRUBIN 0.8 0.9 1.1 1.3*   ALK PHOS 243* 173* 158* 162*         Lab 11/28/21  0441 11/25/21  1410   PROBNP 5,533.0*  --    TROPONIN T  --  <0.010   PROTIME 12.2*  --    INR 1.20*  --                  Brief Urine Lab Results     None        Microbiology Results (last 10 days)     Procedure Component Value - Date/Time    MRSA Screen, PCR (Inpatient) - Swab, Nares [[id number]]  (Normal) Collected: 11/24/21 2313    Lab Status: Final result Specimen: Swab from Nares Updated: 11/25/21 0043     MRSA PCR No MRSA Detected    COVID-19,CEPHEID/TRAVIS/BDMAX,COR/FLORA/PAD/SHYAM IN-HOUSE(OR EMERGENT/ADD-ON),NP SWAB IN TRANSPORT MEDIA 3-4 HR TAT, RT-PCR - Swab, Nasopharynx [[id number]]  (Normal) Collected: 11/24/21 1328    Lab Status: Final result Specimen: Swab from Nasopharynx Updated: 11/24/21 1512     COVID19 Not Detected    Narrative:      Fact sheet for providers: https://www.fda.gov/media/935365/download     Fact sheet for patients: https://www.fda.gov/media/208694/download  Fact sheet for providers: https://www.fda.gov/media/069130/download     Fact sheet for patients: https://www.fda.gov/media/334699/download    Blood Culture - Blood, Arm, Left [[id number]]  (Abnormal) Collected: 11/24/21 1210    Lab Status: Preliminary result Specimen: Blood from Arm, Left Updated: 12/01/21 1342     Blood Culture Cryptococcus neoformans     Comment: Infectious disease consultation is highly recommended to rule out distant foci of infection.        Gram Stain Aerobic Bottle Yeast    Narrative:      Sent to [facility] for MICs.    Blood Culture - Blood, Arm, Left [[id number]]  (Abnormal) Collected: 11/24/21 1210    Lab Status: Preliminary result Specimen: Blood from Arm, Left Updated: 12/01/21 1332     Blood Culture Cryptococcus neoformans     Comment: Infectious disease consultation is  highly recommended to rule out distant foci of infection.        Isolated from Aerobic Bottle     Gram Stain Aerobic Bottle Budding yeast    Narrative:      Sent to [facility] for MICs.    Blood Culture ID, PCR - Blood, Arm, Left [[id number]]  (Abnormal) Collected: 11/24/21 1210    Lab Status: Final result Specimen: Blood from Arm, Left Updated: 11/28/21 0614     BCID, PCR Cryptococcus neoformans/gattii. Identification by BCID2 PCR    Narrative:      Infectious disease consultation is highly recommended to rule out distant foci of infection.               Results for orders placed during the hospital encounter of 08/20/21    Duplex Venous Lower Extremity - Right CAR    Interpretation Summary  · Acute right lower extremity deep vein thrombosis noted in the external iliac and common femoral.  · Acute right lower extremity superficial thrombophlebitis noted in the saphenofemoral junction.  · All other right sided veins appeared normal.  · Findings are similar to that noted on previous study dated 7/20/2021.      Results for orders placed during the hospital encounter of 08/20/21    Duplex Venous Lower Extremity - Right CAR    Interpretation Summary  · Acute right lower extremity deep vein thrombosis noted in the external iliac and common femoral.  · Acute right lower extremity superficial thrombophlebitis noted in the saphenofemoral junction.  · All other right sided veins appeared normal.  · Findings are similar to that noted on previous study dated 7/20/2021.      Results for orders placed during the hospital encounter of 11/24/21    Adult Transthoracic Echo Complete w/ Color, Spectral and Contrast if necessary per protocol    Interpretation Summary  · Left ventricular wall thickness is consistent with mild septal asymmetric hypertrophy.  · Left ventricular ejection fraction appears to be 36 - 40%.  · Left ventricular diastolic function was normal.      Labs Pending at Discharge:  Pending Labs     Order Current Status     Flucytosine, Serum In process    HSV 1 / 2 Antibodies, CSF - Cerebrospinal Fluid, Lumbar Puncture In process    Histoplasma Antigen, CSF or BAL - Cerebrospinal Fluid, Lumbar Puncture In process    Paraneoplastic Profile 1, CSF - Cerebrospinal Fluid, Lumbar Puncture In process    VDRL, CSF - Cerebrospinal Fluid, Lumbar Puncture In process    Blood Culture - Blood, Arm, Left Preliminary result    Blood Culture - Blood, Arm, Left Preliminary result            Discharge Details        Discharge Medications      New Medications      Instructions Start Date   carvedilol 6.25 MG tablet  Commonly known as: COREG   6.25 mg, Oral, 2 Times Daily With Meals      flucytosine 500 MG capsule  Commonly known as: ANCOBON   1,500 mg, Oral, 4 Times Daily      losartan 25 MG tablet  Commonly known as: COZAAR   25 mg, Oral, Every 24 Hours Scheduled   Start Date: December 2, 2021     ondansetron ODT 4 MG disintegrating tablet  Commonly known as: Zofran ODT   4 mg, Translingual, Every 8 Hours PRN      spironolactone 25 MG tablet  Commonly known as: ALDACTONE   25 mg, Oral, Daily   Start Date: December 2, 2021        Changes to Medications      Instructions Start Date   allopurinol 300 MG tablet  Commonly known as: ZYLOPRIM  What changed: Another medication with the same name was removed. Continue taking this medication, and follow the directions you see here.   1 tablet, Oral, 2 Times Daily         Continue These Medications      Instructions Start Date   dexamethasone 4 MG tablet  Commonly known as: DECADRON   4 mg, Oral, Daily With Breakfast      famotidine 20 MG tablet  Commonly known as: PEPCID   20 mg, Oral, 2 Times Daily      folic acid 1 MG tablet  Commonly known as: FOLVITE   1 mg, Oral, Daily      furosemide 40 MG tablet  Commonly known as: LASIX   40 mg, Oral, Daily      prochlorperazine 10 MG tablet  Commonly known as: COMPAZINE   10 mg, Oral, Every 6 Hours PRN      sennosides-docusate 8.6-50 MG per tablet  Commonly known as:  PERICOLACE   1 tablet, Oral, Daily         Stop These Medications    sulfamethoxazole-trimethoprim 800-160 MG per tablet  Commonly known as: BACTRIM DS,SEPTRA DS            Allergies   Allergen Reactions   • Bee Venom Hives   • Lisinopril Other (See Comments)     cough         Discharge Disposition:    Home or Self Care    Diet:  Heart healthy low-salt      Discharge Activity:     As tolerated      Future Appointments   Date Time Provider Department Center   12/2/2021 10:30 AM CHAIR 5 [facility]   12/3/2021  8:00 AM CHAIR 6 [facility]   12/4/2021  8:00 AM [facility]   12/5/2021  8:00 AM [facility]   12/6/2021 12:00 PM CHAIR 7 [facility]   12/7/2021 10:00 AM CHAIR 6 [facility]   12/8/2021 12:00 PM CHAIR 3 [facility]   12/9/2021 10:00 AM CHAIR 7 [facility]   12/10/2021  7:00 AM CHAIR 5 [facility]   12/11/2021 10:00 AM [facility]   12/12/2021 10:00 AM [facility]           Time spent on Discharge including face to face service: 43 minutes.            I have dictated this note utilizing Dragon Dictation.             Please note that portions of this note were completed with a voice recognition program.             Part of this note may be an electronic transcription/translation of spoken language to printed text         using the Dragon Dictation System.       Electronically signed by Jorge Martinez MD, 12/01/21, 2:18 PM EST.

## 2021-12-01 NOTE — PLAN OF CARE
Goal Outcome Evaluation:  Plan of Care Reviewed With: patient           Outcome Summary: Patient NPO, Stress test today. Juan stated yesterday at bedside if patients Stress test is negative patient will go home today.

## 2021-12-01 NOTE — PROGRESS NOTES
Psychiatric     Progress Note    Patient Name: Abiola Garibay  : 1965  MRN: [id number]  Primary Care Physician:  Jessi Ennis APRN  Date of admission: 2021      Subjective   Brief summary.  Follow-up on shortness of breath.  Follow-up on fungemia      HPI:  Follow-up on shortness of breath.  No chest pain or shortness of breath.  Patient seen by cardiologist and is scheduled a stress test for a.m.    Review of Systems     No fever chills.  Generalized weakness  No chest pain      Objective     Vitals:   Temp:  [98 °F (36.7 °C)-98.2 °F (36.8 °C)] 98.2 °F (36.8 °C)  Heart Rate:  [60-76] 64  Resp:  [18] 18  BP: ()/(45-62) 99/48    Physical Exam :     Middle-aged female not in acute distress.  Neck supple  Heart regular.  Lungs clear.  Extremities no edema      Result Review:  I have personally reviewed the results from the time of this admission to 2021 19:35 EST and agree with these findings:  []  Laboratory  []  Microbiology  []  Radiology  []  EKG/Telemetry   []  Cardiology/Vascular   []  Pathology  []  Old records  []  Other:    Lumbar puncture negative      Assessment / Plan       Active Hospital Problems:  Active Hospital Problems    Diagnosis    • Fungemia    • CHF (congestive heart failure) (HCC)        Plan:   Improving.  Continue antifungal.  For stress test in a.m..  Discussed with  and discharge planner for outpatient set up for antibiotics    DVT prophylaxis:  Mechanical DVT prophylaxis orders are present.    CODE STATUS:   Level Of Support Discussed With: Patient  Code Status (Patient has no pulse and is not breathing): CPR (Attempt to Resuscitate)  Medical Interventions (Patient has pulse or is breathing): Full Support        Electronically signed by Jorge Martinez MD, 21, 7:35 PM EST.

## 2021-12-02 ENCOUNTER — READMISSION MANAGEMENT (OUTPATIENT)
Dept: CALL CENTER | Facility: HOSPITAL | Age: 56
End: 2021-12-02

## 2021-12-02 ENCOUNTER — HOSPITAL ENCOUNTER (OUTPATIENT)
Dept: INFUSION THERAPY | Facility: HOSPITAL | Age: 56
Setting detail: INFUSION SERIES
Discharge: HOME | End: 2021-12-02

## 2021-12-02 VITALS
DIASTOLIC BLOOD PRESSURE: 48 MMHG | WEIGHT: 134.92 LBS | SYSTOLIC BLOOD PRESSURE: 114 MMHG | RESPIRATION RATE: 18 BRPM | OXYGEN SATURATION: 98 % | TEMPERATURE: 97.8 F | HEIGHT: 66 IN | BODY MASS INDEX: 21.68 KG/M2 | HEART RATE: 82 BPM

## 2021-12-02 DIAGNOSIS — Z45.2 ENCOUNTER FOR ADJUSTMENT OR MANAGEMENT OF VASCULAR ACCESS DEVICE: ICD-10-CM

## 2021-12-02 DIAGNOSIS — B49 FUNGEMIA: Primary | ICD-10-CM

## 2021-12-02 LAB
ALBUMIN SERPL-MCNC: 3.9 G/DL (ref 3.5–5.2)
ALBUMIN/GLOB SERPL: 2.2 G/DL
ALP SERPL-CCNC: 244 U/L (ref 39–117)
ALT SERPL W P-5'-P-CCNC: 133 U/L (ref 1–33)
ANION GAP SERPL CALCULATED.3IONS-SCNC: 13.1 MMOL/L (ref 5–15)
AST SERPL-CCNC: 156 U/L (ref 1–32)
BILIRUB SERPL-MCNC: 0.8 MG/DL (ref 0–1.2)
BUN SERPL-MCNC: 33 MG/DL (ref 6–20)
BUN/CREAT SERPL: 21.6 (ref 7–25)
CALCIUM SPEC-SCNC: 9.6 MG/DL (ref 8.6–10.5)
CHLORIDE SERPL-SCNC: 105 MMOL/L (ref 98–107)
CO2 SERPL-SCNC: 19.9 MMOL/L (ref 22–29)
CREAT SERPL-MCNC: 1.53 MG/DL (ref 0.57–1)
GFR SERPL CREATININE-BSD FRML MDRD: 42 ML/MIN/1.73
GLOBULIN UR ELPH-MCNC: 1.8 GM/DL
GLUCOSE SERPL-MCNC: 130 MG/DL (ref 65–99)
HSV IGG SER-ACNC: 0.73 IV
MAGNESIUM SERPL-MCNC: 1.5 MG/DL (ref 1.6–2.6)
POTASSIUM SERPL-SCNC: 3.2 MMOL/L (ref 3.5–5.2)
PROT SERPL-MCNC: 5.7 G/DL (ref 6–8.5)
SODIUM SERPL-SCNC: 138 MMOL/L (ref 136–145)

## 2021-12-02 PROCEDURE — 25010000002 AMPHOTERICIN B LIPOSOME PER 10 MG: Performed by: INTERNAL MEDICINE

## 2021-12-02 PROCEDURE — 0 MAGNESIUM SULFATE 4 GM/100ML SOLUTION: Performed by: INTERNAL MEDICINE

## 2021-12-02 PROCEDURE — 83735 ASSAY OF MAGNESIUM: CPT | Performed by: INTERNAL MEDICINE

## 2021-12-02 PROCEDURE — 80053 COMPREHEN METABOLIC PANEL: CPT | Performed by: INTERNAL MEDICINE

## 2021-12-02 PROCEDURE — 96365 THER/PROPH/DIAG IV INF INIT: CPT

## 2021-12-02 PROCEDURE — 96366 THER/PROPH/DIAG IV INF ADDON: CPT

## 2021-12-02 PROCEDURE — 96368 THER/DIAG CONCURRENT INF: CPT

## 2021-12-02 RX ORDER — MAGNESIUM SULFATE 1 G/100ML
1 INJECTION INTRAVENOUS
Status: DISCONTINUED | OUTPATIENT
Start: 2021-12-02 | End: 2021-12-02

## 2021-12-02 RX ORDER — MAGNESIUM SULFATE HEPTAHYDRATE 40 MG/ML
4 INJECTION, SOLUTION INTRAVENOUS ONCE
Status: COMPLETED | OUTPATIENT
Start: 2021-12-02 | End: 2021-12-02

## 2021-12-02 RX ORDER — SODIUM CHLORIDE 9 MG/ML
250 INJECTION, SOLUTION INTRAVENOUS ONCE
Status: DISCONTINUED | OUTPATIENT
Start: 2021-12-02 | End: 2021-12-02

## 2021-12-02 RX ORDER — POTASSIUM CHLORIDE 750 MG/1
40 CAPSULE, EXTENDED RELEASE ORAL ONCE
Status: COMPLETED | OUTPATIENT
Start: 2021-12-02 | End: 2021-12-02

## 2021-12-02 RX ORDER — SODIUM CHLORIDE 9 MG/ML
250 INJECTION, SOLUTION INTRAVENOUS ONCE
Status: CANCELLED | OUTPATIENT
Start: 2021-12-03

## 2021-12-02 RX ORDER — SODIUM CHLORIDE 0.9 % (FLUSH) 0.9 %
20 SYRINGE (ML) INJECTION AS NEEDED
Status: CANCELLED | OUTPATIENT
Start: 2021-12-02

## 2021-12-02 RX ORDER — HEPARIN SODIUM (PORCINE) LOCK FLUSH IV SOLN 100 UNIT/ML 100 UNIT/ML
500 SOLUTION INTRAVENOUS AS NEEDED
Status: DISCONTINUED | OUTPATIENT
Start: 2021-12-02 | End: 2021-12-04 | Stop reason: HOSPADM

## 2021-12-02 RX ORDER — HEPARIN SODIUM (PORCINE) LOCK FLUSH IV SOLN 100 UNIT/ML 100 UNIT/ML
500 SOLUTION INTRAVENOUS AS NEEDED
Status: CANCELLED | OUTPATIENT
Start: 2021-12-02

## 2021-12-02 RX ADMIN — POTASSIUM CHLORIDE 40 MEQ: 750 CAPSULE, EXTENDED RELEASE ORAL at 15:39

## 2021-12-02 RX ADMIN — MAGNESIUM SULFATE 4 G: 4 INJECTION INTRAVENOUS at 15:50

## 2021-12-02 RX ADMIN — AMPHOTERICIN B 200 MG: 50 INJECTION, POWDER, LYOPHILIZED, FOR SOLUTION INTRAVENOUS at 15:36

## 2021-12-02 NOTE — PROGRESS NOTES
"Pharmacy consulted to dose Ambisome for this pt with cryptococcal bacteremia as continuation of therapy after discharge.   Drug/Dose: Ambisome 200mg IV daily (3.2mg/kg)  Duration - 11 days (from 12/2) - end 12/14  Consulting provider: Dr Martinez (Dr Naqvi is attending and assuming care now as OP)   Pt is also on PO flucytosine as well.     167.6 cm (66\")       12/02/21  1027   Weight: 61.2 kg (134 lb 14.7 oz)      Estimated Creatinine Clearance: 39.2 mL/min (A) (by C-G formula based on SCr of 1.53 mg/dL (H)).  Results from last 7 days   Lab Units 12/02/21  1227 11/30/21  0506 11/28/21  0441 11/27/21  1218   BUN mg/dL 33* 23* 18 19   CREATININE mg/dL 1.53* 1.01* 0.94 0.96         Lab Results   Component Value Date    WBC 14.89 (H) 11/30/2021     Microbiology Results (last 10 days)     Procedure Component Value - Date/Time    HSV 1 / 2 Antibodies, CSF - Cerebrospinal Fluid, Lumbar Puncture [[id number]] Collected: 11/28/21 1510    Lab Status: Final result Specimen: Cerebrospinal Fluid from Lumbar Puncture Updated: 12/02/21 0608     HSV 1/2 Ab Screen IgG, CSF 0.73 IV      Comment: INTERPRETIVE INFORMATION: Herpes Simplex Virus Type 1 and/or 2                      Antibodies, IgG CSF    0.89 IV or Less .......... Negative: No significant                               level of detectable HSV IgG                               antibody.    0.90 - 1.09 IV ........... Equivocal: Questionable                               presence of IgG antibodies.                               Repeat testing in 10-14 days                               may be helpful.    1.10 IV or Greater ....... Positive: IgG antibody to HSV                               detected, which may indicate                               a current or past HSV                               infection.  The detection of antibodies to herpes simplex virus in CSF may  indicate central nervous system infection. However, consideration  must be given to possible contamination " by blood or transfer of  serum antibodies across the blood-brain barrier.  Fourfold or greater rise in CSF antibodies to herpes on specimens  at least 4 weeks apart are found in 74-94 % of patients with  herpes encephalitis. Specificity of the test based on a single CSF  testing is not established. Presently PCR is the primary means of  establishing a diagnosis of herpes encephalitis.  This test was developed and its performance characteristics  determined by [facility]. It has not been cleared or  approved by the US Food and Drug Administration. This test was  performed in a CLIA certified laboratory and is intended for  clinical purposes.       Narrative:      Performed at:  01 - [facility] [address]  [id number]  : Camila Silverman MD, Phone:  [phone]    MRSA Screen, PCR (Inpatient) - Swab, Nares [[id number]]  (Normal) Collected: 11/24/21 2312    Lab Status: Final result Specimen: Swab from Nares Updated: 11/25/21 0043     MRSA PCR No MRSA Detected    COVID-19,CEPHEID/TRAVIS/BDMAX,COR/FLORA/PAD/SHYAM IN-HOUSE(OR EMERGENT/ADD-ON),NP SWAB IN TRANSPORT MEDIA 3-4 HR TAT, RT-PCR - Swab, Nasopharynx [[id number]]  (Normal) Collected: 11/24/21 1328    Lab Status: Final result Specimen: Swab from Nasopharynx Updated: 11/24/21 1512     COVID19 Not Detected    Narrative:      Fact sheet for providers: https://www.fda.gov/media/673386/download     Fact sheet for patients: https://www.fda.gov/media/024002/download  Fact sheet for providers: https://www.fda.gov/media/002980/download     Fact sheet for patients: https://www.fda.gov/media/764128/download    Blood Culture - Blood, Arm, Left [[id number]]  (Abnormal) Collected: 11/24/21 1210    Lab Status: Final result Specimen: Blood from Arm, Left Updated: 12/01/21 1556     Blood Culture Cryptococcus neoformans     Comment: Infectious disease consultation is highly recommended to rule out distant foci of infection.        Gram Stain  Aerobic Bottle Yeast    Narrative:      Refer to previous blood culture sent to [facility] collected on 11/24/2021 at 1210 for MICs.    Blood Culture - Blood, Arm, Left [[id number]]  (Abnormal) Collected: 11/24/21 1210    Lab Status: Preliminary result Specimen: Blood from Arm, Left Updated: 12/01/21 1332     Blood Culture Cryptococcus neoformans     Comment: Infectious disease consultation is highly recommended to rule out distant foci of infection.        Isolated from Aerobic Bottle     Gram Stain Aerobic Bottle Budding yeast    Narrative:      Sent to [facility] for MICs.    Blood Culture ID, PCR - Blood, Arm, Left [[id number]]  (Abnormal) Collected: 11/24/21 1210    Lab Status: Final result Specimen: Blood from Arm, Left Updated: 11/28/21 0614     BCID, PCR Cryptococcus neoformans/gattii. Identification by BCID2 PCR    Narrative:      Infectious disease consultation is highly recommended to rule out distant foci of infection.           Assessment / Plan    Labs ordered - Mag 1.5 K 3.2  Contacted Dr Naqvi  Lab results -  will provide 4gm IV mag and Potassium 40meq PO x 1  Also instructing pt to hold lasix x 2 days and to reduce of flucytosine to BID per discussion with Dr Naqvi.   Will continue to follow daily labs.   Thank you for the consult

## 2021-12-02 NOTE — CODE DOCUMENTATION
I got a call from Heidy in pharmacy regarding patients labs.  She spoke to Dr. Martinez regarding labs and he instructed her to tell the patient to hold her Lasix x2 days, change the flucytosine to twice a day instead of four times a day.  She is also going to be getting oral potassium and 4 grams of magnesium.  I explained this to the patient, she stated that she had already taken her lasix today, I instructed her to hold her dose tomorrow and Saturday, she verbalized understanding.  I then instructed her about the change in the flucytosine.  I also gave her written instructions, she verbalized understanding.  I also informed her of the additional infusion and oral meds.

## 2021-12-02 NOTE — OUTREACH NOTE
Prep Survey      Responses   [facility] facility patient discharged from? [facility]   Is LACE score < 7 ? No   Emergency Room discharge w/ pulse ox? No   Eligibility Readm Mgmt   Discharge diagnosis CHF, Fungemia   Does the patient have one of the following disease processes/diagnoses(primary or secondary)? CHF   Does the patient have Home health ordered? Yes   What is the Home health agency?  [facility]   Is there a DME ordered? Yes   What DME was ordered? IV abx- [facility]    Prep survey completed? Yes          Irene Santos RN

## 2021-12-03 ENCOUNTER — HOSPITAL ENCOUNTER (OUTPATIENT)
Dept: INFUSION THERAPY | Facility: HOSPITAL | Age: 56
Setting detail: INFUSION SERIES
Discharge: HOME | End: 2021-12-03

## 2021-12-03 VITALS
RESPIRATION RATE: 18 BRPM | OXYGEN SATURATION: 100 % | DIASTOLIC BLOOD PRESSURE: 45 MMHG | TEMPERATURE: 98.6 F | HEART RATE: 58 BPM | SYSTOLIC BLOOD PRESSURE: 111 MMHG

## 2021-12-03 DIAGNOSIS — Z45.2 ENCOUNTER FOR ADJUSTMENT OR MANAGEMENT OF VASCULAR ACCESS DEVICE: ICD-10-CM

## 2021-12-03 DIAGNOSIS — B49 FUNGEMIA: Primary | ICD-10-CM

## 2021-12-03 LAB
BH CV IMMEDIATE POST RECOVERY TECH DATA SYMPTOMS: NORMAL
BH CV IMMEDIATE POST TECH DATA BLOOD PRESSURE: NORMAL MMHG
BH CV IMMEDIATE POST TECH DATA HEART RATE: 95 BPM
BH CV IMMEDIATE POST TECH DATA OXYGEN SATS: 98 %
BH CV REST NUCLEAR ISOTOPE DOSE: 9.6 MCI
BH CV SIX MINUTE RECOVERY TECH DATA BLOOD PRESSURE: NORMAL
BH CV SIX MINUTE RECOVERY TECH DATA HEART RATE: 76 BPM
BH CV SIX MINUTE RECOVERY TECH DATA OXYGEN SATURATION: 98 %
BH CV SIX MINUTE RECOVERY TECH DATA SYMPTOMS: NORMAL
BH CV STRESS BP STAGE 1: NORMAL
BH CV STRESS COMMENTS STAGE 1: NORMAL
BH CV STRESS DOSE REGADENOSON STAGE 1: 0.4
BH CV STRESS DURATION MIN STAGE 1: 0
BH CV STRESS DURATION SEC STAGE 1: 10
BH CV STRESS HR STAGE 1: 73
BH CV STRESS NUCLEAR ISOTOPE DOSE: 38 MCI
BH CV STRESS O2 STAGE 1: 98
BH CV STRESS PROTOCOL 1: NORMAL
BH CV STRESS RECOVERY BP: NORMAL MMHG
BH CV STRESS RECOVERY HR: 76 BPM
BH CV STRESS RECOVERY O2: 98 %
BH CV STRESS STAGE 1: 1
BH CV THREE MINUTE POST TECH DATA BLOOD PRESSURE: NORMAL MMHG
BH CV THREE MINUTE POST TECH DATA HEART RATE: 77 BPM
BH CV THREE MINUTE POST TECH DATA OXYGEN SATURATION: 97 %
BH CV THREE MINUTE RECOVERY TECH DATA SYMPTOM: NORMAL
INTERPRETATION: NEGATIVE
LV EF NUC BP: 45 %
MAXIMAL PREDICTED HEART RATE: 163 BPM
PERCENT MAX PREDICTED HR: 58.28 %
RESULT: NORMAL NG/ML
STRESS BASELINE BP: NORMAL MMHG
STRESS BASELINE HR: 75 BPM
STRESS O2 SAT REST: 98 %
STRESS PERCENT HR: 69 %
STRESS POST O2 SAT PEAK: 98 %
STRESS POST PEAK BP: NORMAL MMHG
STRESS POST PEAK HR: 95 BPM
STRESS TARGET HR: 139 BPM

## 2021-12-03 PROCEDURE — 96365 THER/PROPH/DIAG IV INF INIT: CPT

## 2021-12-03 PROCEDURE — 96366 THER/PROPH/DIAG IV INF ADDON: CPT

## 2021-12-03 PROCEDURE — 25010000002 AMPHOTERICIN B LIPOSOME PER 10 MG: Performed by: INTERNAL MEDICINE

## 2021-12-03 PROCEDURE — 25010000002 HEPARIN LOCK FLUSH PER 10 UNITS: Performed by: INTERNAL MEDICINE

## 2021-12-03 RX ORDER — HEPARIN SODIUM (PORCINE) LOCK FLUSH IV SOLN 100 UNIT/ML 100 UNIT/ML
500 SOLUTION INTRAVENOUS AS NEEDED
Status: CANCELLED | OUTPATIENT
Start: 2021-12-03

## 2021-12-03 RX ORDER — HEPARIN SODIUM (PORCINE) LOCK FLUSH IV SOLN 100 UNIT/ML 100 UNIT/ML
500 SOLUTION INTRAVENOUS AS NEEDED
Status: DISCONTINUED | OUTPATIENT
Start: 2021-12-03 | End: 2021-12-05 | Stop reason: HOSPADM

## 2021-12-03 RX ORDER — SODIUM CHLORIDE 0.9 % (FLUSH) 0.9 %
20 SYRINGE (ML) INJECTION AS NEEDED
Status: CANCELLED | OUTPATIENT
Start: 2021-12-03

## 2021-12-03 RX ADMIN — AMPHOTERICIN B 200 MG: 50 INJECTION, POWDER, LYOPHILIZED, FOR SOLUTION INTRAVENOUS at 12:49

## 2021-12-03 RX ADMIN — HEPARIN SODIUM (PORCINE) LOCK FLUSH IV SOLN 100 UNIT/ML 200 UNITS: 100 SOLUTION at 15:11

## 2021-12-04 ENCOUNTER — HOSPITAL ENCOUNTER (OUTPATIENT)
Dept: INFUSION THERAPY | Facility: HOSPITAL | Age: 56
Setting detail: INFUSION SERIES
Discharge: HOME | End: 2021-12-04

## 2021-12-04 VITALS
DIASTOLIC BLOOD PRESSURE: 51 MMHG | RESPIRATION RATE: 16 BRPM | TEMPERATURE: 98 F | OXYGEN SATURATION: 100 % | HEART RATE: 61 BPM | SYSTOLIC BLOOD PRESSURE: 102 MMHG

## 2021-12-04 DIAGNOSIS — B49 FUNGEMIA: Primary | ICD-10-CM

## 2021-12-04 LAB
ALBUMIN SERPL-MCNC: 3.9 G/DL (ref 3.5–5.2)
ALBUMIN/GLOB SERPL: 2.2 G/DL
ALP SERPL-CCNC: 279 U/L (ref 39–117)
ALT SERPL W P-5'-P-CCNC: 268 U/L (ref 1–33)
ANION GAP SERPL CALCULATED.3IONS-SCNC: 10.7 MMOL/L (ref 5–15)
AST SERPL-CCNC: 297 U/L (ref 1–32)
BILIRUB SERPL-MCNC: 0.9 MG/DL (ref 0–1.2)
BUN SERPL-MCNC: 40 MG/DL (ref 6–20)
BUN/CREAT SERPL: 25.3 (ref 7–25)
CALCIUM SPEC-SCNC: 9.9 MG/DL (ref 8.6–10.5)
CHLORIDE SERPL-SCNC: 106 MMOL/L (ref 98–107)
CO2 SERPL-SCNC: 19.3 MMOL/L (ref 22–29)
CREAT SERPL-MCNC: 1.58 MG/DL (ref 0.57–1)
GFR SERPL CREATININE-BSD FRML MDRD: 41 ML/MIN/1.73
GLOBULIN UR ELPH-MCNC: 1.8 GM/DL
GLUCOSE SERPL-MCNC: 129 MG/DL (ref 65–99)
MAGNESIUM SERPL-MCNC: 1.6 MG/DL (ref 1.6–2.6)
POTASSIUM SERPL-SCNC: 3.1 MMOL/L (ref 3.5–5.2)
PROT SERPL-MCNC: 5.7 G/DL (ref 6–8.5)
SODIUM SERPL-SCNC: 136 MMOL/L (ref 136–145)

## 2021-12-04 PROCEDURE — 83735 ASSAY OF MAGNESIUM: CPT | Performed by: INTERNAL MEDICINE

## 2021-12-04 PROCEDURE — 80053 COMPREHEN METABOLIC PANEL: CPT | Performed by: INTERNAL MEDICINE

## 2021-12-04 PROCEDURE — 25010000002 MAGNESIUM SULFATE IN D5W 1G/100ML (PREMIX) 1-5 GM/100ML-% SOLUTION: Performed by: INTERNAL MEDICINE

## 2021-12-04 PROCEDURE — 96365 THER/PROPH/DIAG IV INF INIT: CPT

## 2021-12-04 PROCEDURE — 25010000002 AMPHOTERICIN B LIPOSOME PER 10 MG: Performed by: INTERNAL MEDICINE

## 2021-12-04 PROCEDURE — 96368 THER/DIAG CONCURRENT INF: CPT

## 2021-12-04 PROCEDURE — 96367 TX/PROPH/DG ADDL SEQ IV INF: CPT

## 2021-12-04 RX ORDER — MAGNESIUM SULFATE 1 G/100ML
2 INJECTION INTRAVENOUS
Status: DISCONTINUED | OUTPATIENT
Start: 2021-12-04 | End: 2021-12-04

## 2021-12-04 RX ORDER — POTASSIUM CHLORIDE 750 MG/1
40 CAPSULE, EXTENDED RELEASE ORAL ONCE
Status: COMPLETED | OUTPATIENT
Start: 2021-12-04 | End: 2021-12-04

## 2021-12-04 RX ORDER — MAGNESIUM SULFATE 1 G/100ML
1 INJECTION INTRAVENOUS
Status: COMPLETED | OUTPATIENT
Start: 2021-12-04 | End: 2021-12-04

## 2021-12-04 RX ORDER — POTASSIUM CHLORIDE 750 MG/1
40 CAPSULE, EXTENDED RELEASE ORAL ONCE
Status: DISCONTINUED | OUTPATIENT
Start: 2021-12-04 | End: 2021-12-04

## 2021-12-04 RX ADMIN — POTASSIUM CHLORIDE 40 MEQ: 750 CAPSULE, EXTENDED RELEASE ORAL at 12:00

## 2021-12-04 RX ADMIN — AMPHOTERICIN B 150 MG: 50 INJECTION, POWDER, LYOPHILIZED, FOR SOLUTION INTRAVENOUS at 11:59

## 2021-12-04 RX ADMIN — MAGNESIUM SULFATE 1 G: 1 INJECTION INTRAVENOUS at 11:08

## 2021-12-04 RX ADMIN — MAGNESIUM SULFATE 1 G: 1 INJECTION INTRAVENOUS at 12:19

## 2021-12-05 ENCOUNTER — HOSPITAL ENCOUNTER (OUTPATIENT)
Dept: INFUSION THERAPY | Facility: HOSPITAL | Age: 56
Setting detail: INFUSION SERIES
Discharge: HOME | End: 2021-12-05

## 2021-12-05 VITALS
HEART RATE: 67 BPM | SYSTOLIC BLOOD PRESSURE: 114 MMHG | TEMPERATURE: 98.2 F | RESPIRATION RATE: 14 BRPM | OXYGEN SATURATION: 97 % | DIASTOLIC BLOOD PRESSURE: 66 MMHG

## 2021-12-05 DIAGNOSIS — Z45.2 ENCOUNTER FOR ADJUSTMENT OR MANAGEMENT OF VASCULAR ACCESS DEVICE: ICD-10-CM

## 2021-12-05 DIAGNOSIS — B49 FUNGEMIA: Primary | ICD-10-CM

## 2021-12-05 LAB
ALBUMIN SERPL-MCNC: 3.8 G/DL (ref 3.5–5.2)
ALBUMIN/GLOB SERPL: 2.4 G/DL
ALP SERPL-CCNC: 281 U/L (ref 39–117)
ALT SERPL W P-5'-P-CCNC: 383 U/L (ref 1–33)
ANION GAP SERPL CALCULATED.3IONS-SCNC: 10 MMOL/L (ref 5–15)
AST SERPL-CCNC: 386 U/L (ref 1–32)
BILIRUB SERPL-MCNC: 1 MG/DL (ref 0–1.2)
BUN SERPL-MCNC: 39 MG/DL (ref 6–20)
BUN/CREAT SERPL: 27.1 (ref 7–25)
CALCIUM SPEC-SCNC: 10.1 MG/DL (ref 8.6–10.5)
CHLORIDE SERPL-SCNC: 106 MMOL/L (ref 98–107)
CO2 SERPL-SCNC: 20 MMOL/L (ref 22–29)
CREAT SERPL-MCNC: 1.44 MG/DL (ref 0.57–1)
GFR SERPL CREATININE-BSD FRML MDRD: 45 ML/MIN/1.73
GLOBULIN UR ELPH-MCNC: 1.6 GM/DL
GLUCOSE SERPL-MCNC: 128 MG/DL (ref 65–99)
POTASSIUM SERPL-SCNC: 3.2 MMOL/L (ref 3.5–5.2)
PROT SERPL-MCNC: 5.4 G/DL (ref 6–8.5)
SODIUM SERPL-SCNC: 136 MMOL/L (ref 136–145)

## 2021-12-05 PROCEDURE — 96366 THER/PROPH/DIAG IV INF ADDON: CPT

## 2021-12-05 PROCEDURE — 80053 COMPREHEN METABOLIC PANEL: CPT | Performed by: INTERNAL MEDICINE

## 2021-12-05 PROCEDURE — 96374 THER/PROPH/DIAG INJ IV PUSH: CPT

## 2021-12-05 PROCEDURE — 25010000002 HEPARIN LOCK FLUSH PER 10 UNITS: Performed by: INTERNAL MEDICINE

## 2021-12-05 PROCEDURE — 25010000002 AMPHOTERICIN B LIPOSOME PER 10 MG: Performed by: INTERNAL MEDICINE

## 2021-12-05 PROCEDURE — 96365 THER/PROPH/DIAG IV INF INIT: CPT

## 2021-12-05 RX ORDER — SODIUM CHLORIDE 0.9 % (FLUSH) 0.9 %
20 SYRINGE (ML) INJECTION AS NEEDED
Status: CANCELLED | OUTPATIENT
Start: 2021-12-05

## 2021-12-05 RX ORDER — HEPARIN SODIUM (PORCINE) LOCK FLUSH IV SOLN 100 UNIT/ML 100 UNIT/ML
500 SOLUTION INTRAVENOUS AS NEEDED
Status: DISCONTINUED | OUTPATIENT
Start: 2021-12-05 | End: 2021-12-07 | Stop reason: HOSPADM

## 2021-12-05 RX ORDER — HEPARIN SODIUM (PORCINE) LOCK FLUSH IV SOLN 100 UNIT/ML 100 UNIT/ML
500 SOLUTION INTRAVENOUS AS NEEDED
Status: CANCELLED | OUTPATIENT
Start: 2021-12-05

## 2021-12-05 RX ORDER — SODIUM CHLORIDE 0.9 % (FLUSH) 0.9 %
20 SYRINGE (ML) INJECTION AS NEEDED
Status: DISCONTINUED | OUTPATIENT
Start: 2021-12-05 | End: 2021-12-07 | Stop reason: HOSPADM

## 2021-12-05 RX ADMIN — AMPHOTERICIN B 150 MG: 50 INJECTION, POWDER, LYOPHILIZED, FOR SOLUTION INTRAVENOUS at 08:50

## 2021-12-05 RX ADMIN — HEPARIN SODIUM (PORCINE) LOCK FLUSH IV SOLN 100 UNIT/ML 500 UNITS: 100 SOLUTION at 10:44

## 2021-12-05 RX ADMIN — DEXTROSE 50 ML: 5 SOLUTION INTRAVENOUS at 10:31

## 2021-12-06 ENCOUNTER — HOSPITAL ENCOUNTER (OUTPATIENT)
Dept: INFUSION THERAPY | Facility: HOSPITAL | Age: 56
Setting detail: INFUSION SERIES
Discharge: HOME | End: 2021-12-06

## 2021-12-06 VITALS
SYSTOLIC BLOOD PRESSURE: 105 MMHG | TEMPERATURE: 98 F | HEART RATE: 58 BPM | RESPIRATION RATE: 16 BRPM | DIASTOLIC BLOOD PRESSURE: 52 MMHG | OXYGEN SATURATION: 100 %

## 2021-12-06 DIAGNOSIS — Z45.2 ENCOUNTER FOR ADJUSTMENT OR MANAGEMENT OF VASCULAR ACCESS DEVICE: ICD-10-CM

## 2021-12-06 DIAGNOSIS — B49 FUNGEMIA: Primary | ICD-10-CM

## 2021-12-06 LAB
ALBUMIN SERPL-MCNC: 3.9 G/DL (ref 3.5–5.2)
ALBUMIN/GLOB SERPL: 2.2 G/DL
ALP SERPL-CCNC: 267 U/L (ref 39–117)
ALT SERPL W P-5'-P-CCNC: 292 U/L (ref 1–33)
ANION GAP SERPL CALCULATED.3IONS-SCNC: 12.3 MMOL/L (ref 5–15)
AST SERPL-CCNC: 198 U/L (ref 1–32)
BILIRUB SERPL-MCNC: 1 MG/DL (ref 0–1.2)
BUN SERPL-MCNC: 41 MG/DL (ref 6–20)
BUN/CREAT SERPL: 23.2 (ref 7–25)
CALCIUM SPEC-SCNC: 10.3 MG/DL (ref 8.6–10.5)
CHLORIDE SERPL-SCNC: 105 MMOL/L (ref 98–107)
CO2 SERPL-SCNC: 21.7 MMOL/L (ref 22–29)
CREAT SERPL-MCNC: 1.77 MG/DL (ref 0.57–1)
GFR SERPL CREATININE-BSD FRML MDRD: 36 ML/MIN/1.73
GLOBULIN UR ELPH-MCNC: 1.8 GM/DL
GLUCOSE SERPL-MCNC: 165 MG/DL (ref 65–99)
MAGNESIUM SERPL-MCNC: 1.5 MG/DL (ref 1.6–2.6)
POTASSIUM SERPL-SCNC: 3.5 MMOL/L (ref 3.5–5.2)
PROT SERPL-MCNC: 5.7 G/DL (ref 6–8.5)
QT INTERVAL: 426 MS
SODIUM SERPL-SCNC: 139 MMOL/L (ref 136–145)

## 2021-12-06 PROCEDURE — 83735 ASSAY OF MAGNESIUM: CPT | Performed by: INTERNAL MEDICINE

## 2021-12-06 PROCEDURE — 36592 COLLECT BLOOD FROM PICC: CPT

## 2021-12-06 PROCEDURE — 96366 THER/PROPH/DIAG IV INF ADDON: CPT

## 2021-12-06 PROCEDURE — 25010000002 HEPARIN LOCK FLUSH PER 10 UNITS: Performed by: INTERNAL MEDICINE

## 2021-12-06 PROCEDURE — 96365 THER/PROPH/DIAG IV INF INIT: CPT

## 2021-12-06 PROCEDURE — 25010000002 AMPHOTERICIN B LIPOSOME PER 10 MG: Performed by: INTERNAL MEDICINE

## 2021-12-06 PROCEDURE — 80053 COMPREHEN METABOLIC PANEL: CPT | Performed by: INTERNAL MEDICINE

## 2021-12-06 RX ORDER — HEPARIN SODIUM (PORCINE) LOCK FLUSH IV SOLN 100 UNIT/ML 100 UNIT/ML
500 SOLUTION INTRAVENOUS AS NEEDED
Status: CANCELLED | OUTPATIENT
Start: 2021-12-06

## 2021-12-06 RX ORDER — SODIUM CHLORIDE 0.9 % (FLUSH) 0.9 %
20 SYRINGE (ML) INJECTION AS NEEDED
Status: CANCELLED | OUTPATIENT
Start: 2021-12-06

## 2021-12-06 RX ORDER — SODIUM CHLORIDE 0.9 % (FLUSH) 0.9 %
20 SYRINGE (ML) INJECTION AS NEEDED
Status: DISCONTINUED | OUTPATIENT
Start: 2021-12-06 | End: 2021-12-08 | Stop reason: HOSPADM

## 2021-12-06 RX ORDER — HEPARIN SODIUM (PORCINE) LOCK FLUSH IV SOLN 100 UNIT/ML 100 UNIT/ML
500 SOLUTION INTRAVENOUS AS NEEDED
Status: DISCONTINUED | OUTPATIENT
Start: 2021-12-06 | End: 2021-12-08 | Stop reason: HOSPADM

## 2021-12-06 RX ADMIN — DEXTROSE MONOHYDRATE 50 ML: 50 INJECTION, SOLUTION INTRAVENOUS at 14:49

## 2021-12-06 RX ADMIN — AMPHOTERICIN B 150 MG: 50 INJECTION, POWDER, LYOPHILIZED, FOR SOLUTION INTRAVENOUS at 12:45

## 2021-12-06 RX ADMIN — DEXTROSE MONOHYDRATE 50 ML: 50 INJECTION, SOLUTION INTRAVENOUS at 12:08

## 2021-12-06 RX ADMIN — HEPARIN SODIUM (PORCINE) LOCK FLUSH IV SOLN 100 UNIT/ML 500 UNITS: 100 SOLUTION at 15:15

## 2021-12-06 NOTE — PROGRESS NOTES
Pharmacy assisting with dosing and monitoring of IV Ambisome.   Pt has had steady increase in srcr and also elevation of LFTs.  Discussed with Infectious Disease via phone (Dr Aaron Gunter) and he recommended to transition to monotherapy with po fluconazole and stop Ambisome.   Dr Naqvi okayed this change after dose of ambisome today.  [facility] for Fluconaole 400mg daily x 8 weeks then 200mg daily for 6 months.   Discussed with pt the plan going forward and to stop taking flucytosine.     Thank you for allowing to assist with this pt.     Please call with any questions    [phone]  Madhuri Puente, Pharm D, BCPS

## 2021-12-07 ENCOUNTER — APPOINTMENT (OUTPATIENT)
Dept: INFUSION THERAPY | Facility: HOSPITAL | Age: 56
End: 2021-12-07

## 2021-12-07 ENCOUNTER — READMISSION MANAGEMENT (OUTPATIENT)
Dept: CALL CENTER | Facility: HOSPITAL | Age: 56
End: 2021-12-07

## 2021-12-07 LAB
HU1 AB TITR CSF IF: NORMAL TITER
HU2 AB TITR CSF IF: NORMAL TITER
PURKINJE CELLS AB TITR CSF IF: NORMAL TITER

## 2021-12-07 NOTE — OUTREACH NOTE
CHF Week 1 Survey      Responses   [facility] patient discharged from? [facility]   Does the patient have one of the following disease processes/diagnoses(primary or secondary)? CHF   CHF Week 1 attempt successful? No   Unsuccessful attempts Attempt 1          Cassandra Solorzano LPN

## 2021-12-08 ENCOUNTER — APPOINTMENT (OUTPATIENT)
Dept: INFUSION THERAPY | Facility: HOSPITAL | Age: 56
End: 2021-12-08

## 2021-12-09 ENCOUNTER — LAB (OUTPATIENT)
Dept: LAB | Facility: HOSPITAL | Age: 56
End: 2021-12-09

## 2021-12-09 ENCOUNTER — APPOINTMENT (OUTPATIENT)
Dept: INFUSION THERAPY | Facility: HOSPITAL | Age: 56
End: 2021-12-09

## 2021-12-09 DIAGNOSIS — B49 FUNGEMIA: ICD-10-CM

## 2021-12-09 LAB
ALBUMIN SERPL-MCNC: 4.1 G/DL (ref 3.5–5.2)
ALBUMIN/GLOB SERPL: 2.3 G/DL
ALP SERPL-CCNC: 275 U/L (ref 39–117)
ALT SERPL W P-5'-P-CCNC: 255 U/L (ref 1–33)
ANION GAP SERPL CALCULATED.3IONS-SCNC: 13.6 MMOL/L (ref 5–15)
AST SERPL-CCNC: 172 U/L (ref 1–32)
BACTERIA SPEC AEROBE CULT: ABNORMAL
BILIRUB SERPL-MCNC: 1.1 MG/DL (ref 0–1.2)
BUN SERPL-MCNC: 50 MG/DL (ref 6–20)
BUN/CREAT SERPL: 27.2 (ref 7–25)
CALCIUM SPEC-SCNC: 10.2 MG/DL (ref 8.6–10.5)
CHLORIDE SERPL-SCNC: 106 MMOL/L (ref 98–107)
CO2 SERPL-SCNC: 20.4 MMOL/L (ref 22–29)
CREAT SERPL-MCNC: 1.84 MG/DL (ref 0.57–1)
GFR SERPL CREATININE-BSD FRML MDRD: 34 ML/MIN/1.73
GLOBULIN UR ELPH-MCNC: 1.8 GM/DL
GLUCOSE SERPL-MCNC: 154 MG/DL (ref 65–99)
GRAM STN SPEC: ABNORMAL
ISOLATED FROM: ABNORMAL
MAGNESIUM SERPL-MCNC: 1.4 MG/DL (ref 1.6–2.6)
POTASSIUM SERPL-SCNC: 3.4 MMOL/L (ref 3.5–5.2)
PROT SERPL-MCNC: 5.9 G/DL (ref 6–8.5)
SODIUM SERPL-SCNC: 140 MMOL/L (ref 136–145)

## 2021-12-09 PROCEDURE — 80053 COMPREHEN METABOLIC PANEL: CPT

## 2021-12-09 PROCEDURE — 83735 ASSAY OF MAGNESIUM: CPT

## 2021-12-09 PROCEDURE — 36415 COLL VENOUS BLD VENIPUNCTURE: CPT

## 2021-12-09 RX ORDER — MAGNESIUM SULFATE HEPTAHYDRATE 40 MG/ML
2 INJECTION, SOLUTION INTRAVENOUS ONCE
Status: CANCELLED
Start: 2021-12-10 | End: 2021-12-10

## 2021-12-10 ENCOUNTER — HOSPITAL ENCOUNTER (OUTPATIENT)
Dept: INFUSION THERAPY | Facility: HOSPITAL | Age: 56
Setting detail: INFUSION SERIES
Discharge: HOME | End: 2021-12-10

## 2021-12-10 ENCOUNTER — HOSPITAL ENCOUNTER (OUTPATIENT)
Dept: INFUSION THERAPY | Facility: HOSPITAL | Age: 56
Setting detail: INFUSION SERIES
End: 2021-12-10

## 2021-12-10 ENCOUNTER — READMISSION MANAGEMENT (OUTPATIENT)
Dept: CALL CENTER | Facility: HOSPITAL | Age: 56
End: 2021-12-10

## 2021-12-10 VITALS
OXYGEN SATURATION: 100 % | HEIGHT: 66 IN | SYSTOLIC BLOOD PRESSURE: 114 MMHG | WEIGHT: 137.57 LBS | TEMPERATURE: 98.2 F | DIASTOLIC BLOOD PRESSURE: 50 MMHG | HEART RATE: 72 BPM | RESPIRATION RATE: 16 BRPM | BODY MASS INDEX: 22.11 KG/M2

## 2021-12-10 DIAGNOSIS — Z45.2 ENCOUNTER FOR ADJUSTMENT OR MANAGEMENT OF VASCULAR ACCESS DEVICE: ICD-10-CM

## 2021-12-10 DIAGNOSIS — B49 FUNGEMIA: Primary | ICD-10-CM

## 2021-12-10 PROCEDURE — 25010000002 MAGNESIUM SULFATE 2 GM/50ML SOLUTION: Performed by: INTERNAL MEDICINE

## 2021-12-10 PROCEDURE — 96365 THER/PROPH/DIAG IV INF INIT: CPT

## 2021-12-10 PROCEDURE — 25010000002 HEPARIN LOCK FLUSH PER 10 UNITS: Performed by: INTERNAL MEDICINE

## 2021-12-10 RX ORDER — HEPARIN SODIUM (PORCINE) LOCK FLUSH IV SOLN 100 UNIT/ML 100 UNIT/ML
500 SOLUTION INTRAVENOUS AS NEEDED
OUTPATIENT
Start: 2021-12-10

## 2021-12-10 RX ORDER — HEPARIN SODIUM (PORCINE) LOCK FLUSH IV SOLN 100 UNIT/ML 100 UNIT/ML
500 SOLUTION INTRAVENOUS AS NEEDED
Status: DISCONTINUED | OUTPATIENT
Start: 2021-12-10 | End: 2021-12-12 | Stop reason: HOSPADM

## 2021-12-10 RX ORDER — MAGNESIUM SULFATE HEPTAHYDRATE 40 MG/ML
2 INJECTION, SOLUTION INTRAVENOUS ONCE
Status: CANCELLED
Start: 2021-12-10 | End: 2021-12-10

## 2021-12-10 RX ORDER — MAGNESIUM SULFATE HEPTAHYDRATE 40 MG/ML
2 INJECTION, SOLUTION INTRAVENOUS ONCE
Status: COMPLETED | OUTPATIENT
Start: 2021-12-10 | End: 2021-12-10

## 2021-12-10 RX ORDER — SODIUM CHLORIDE 0.9 % (FLUSH) 0.9 %
20 SYRINGE (ML) INJECTION AS NEEDED
OUTPATIENT
Start: 2021-12-10

## 2021-12-10 RX ADMIN — HEPARIN SODIUM (PORCINE) LOCK FLUSH IV SOLN 100 UNIT/ML 500 UNITS: 100 SOLUTION at 09:26

## 2021-12-10 RX ADMIN — MAGNESIUM SULFATE 2 G: 2 INJECTION INTRAVENOUS at 08:32

## 2021-12-10 NOTE — OUTREACH NOTE
CHF Week 1 Survey      Responses   [facility] patient discharged from? [facility]   Does the patient have one of the following disease processes/diagnoses(primary or secondary)? CHF   CHF Week 1 attempt successful? No   Unsuccessful attempts Attempt 2          Minerva Hastings RN

## 2021-12-11 ENCOUNTER — APPOINTMENT (OUTPATIENT)
Dept: INFUSION THERAPY | Facility: HOSPITAL | Age: 56
End: 2021-12-11

## 2021-12-12 ENCOUNTER — HOSPITAL ENCOUNTER (OUTPATIENT)
Dept: INFUSION THERAPY | Facility: HOSPITAL | Age: 56
Setting detail: INFUSION SERIES
End: 2021-12-12

## 2021-12-22 ENCOUNTER — TRANSCRIBE ORDERS (OUTPATIENT)
Dept: ADMINISTRATIVE | Facility: HOSPITAL | Age: 56
End: 2021-12-22

## 2021-12-22 ENCOUNTER — LAB (OUTPATIENT)
Dept: LAB | Facility: HOSPITAL | Age: 56
End: 2021-12-22

## 2021-12-22 DIAGNOSIS — N17.9 ACUTE RENAL FAILURE, UNSPECIFIED ACUTE RENAL FAILURE TYPE (HCC): ICD-10-CM

## 2021-12-22 DIAGNOSIS — N17.9 ACUTE RENAL FAILURE, UNSPECIFIED ACUTE RENAL FAILURE TYPE (HCC): Primary | ICD-10-CM

## 2021-12-22 LAB
25(OH)D3 SERPL-MCNC: 35.6 NG/ML
ALBUMIN SERPL-MCNC: 3.5 G/DL (ref 3.5–5.2)
ANION GAP SERPL CALCULATED.3IONS-SCNC: 18.1 MMOL/L (ref 5–15)
BACTERIA UR QL AUTO: ABNORMAL /HPF
BILIRUB UR QL STRIP: NEGATIVE
BUN SERPL-MCNC: 84 MG/DL (ref 6–20)
BUN/CREAT SERPL: 40.6 (ref 7–25)
CALCIUM SPEC-SCNC: 9.4 MG/DL (ref 8.6–10.5)
CHLORIDE SERPL-SCNC: 101 MMOL/L (ref 98–107)
CLARITY UR: CLEAR
CO2 SERPL-SCNC: 16.9 MMOL/L (ref 22–29)
COLOR UR: YELLOW
CREAT SERPL-MCNC: 2.07 MG/DL (ref 0.57–1)
CREAT UR-MCNC: 24.3 MG/DL
GFR SERPL CREATININE-BSD FRML MDRD: 30 ML/MIN/1.73
GLUCOSE SERPL-MCNC: 162 MG/DL (ref 65–99)
GLUCOSE UR STRIP-MCNC: NEGATIVE MG/DL
HCT VFR BLD AUTO: 22.8 % (ref 34–46.6)
HGB BLD-MCNC: 7.9 G/DL (ref 12–15.9)
HGB UR QL STRIP.AUTO: NEGATIVE
HYALINE CASTS UR QL AUTO: ABNORMAL /LPF
KETONES UR QL STRIP: NEGATIVE
LEUKOCYTE ESTERASE UR QL STRIP.AUTO: NEGATIVE
MAGNESIUM SERPL-MCNC: 2.7 MG/DL (ref 1.6–2.6)
MCH RBC QN AUTO: 34.6 PG (ref 26.6–33)
MCHC RBC AUTO-ENTMCNC: 34.6 G/DL (ref 31.5–35.7)
MCV RBC AUTO: 100 FL (ref 79–97)
NITRITE UR QL STRIP: NEGATIVE
PH UR STRIP.AUTO: 6 [PH] (ref 5–8)
PHOSPHATE SERPL-MCNC: 10.2 MG/DL (ref 2.5–4.5)
PLATELET # BLD AUTO: 37 10*3/MM3 (ref 140–450)
PMV BLD AUTO: 11.2 FL (ref 6–12)
POTASSIUM SERPL-SCNC: 5.2 MMOL/L (ref 3.5–5.2)
PROT ?TM UR-MCNC: 9 MG/DL
PROT UR QL STRIP: ABNORMAL
PTH-INTACT SERPL-MCNC: 55.5 PG/ML (ref 15–65)
RBC # BLD AUTO: 2.28 10*6/MM3 (ref 3.77–5.28)
RBC # UR STRIP: ABNORMAL /HPF
REF LAB TEST METHOD: ABNORMAL
SODIUM SERPL-SCNC: 136 MMOL/L (ref 136–145)
SP GR UR STRIP: 1.01 (ref 1–1.03)
SQUAMOUS #/AREA URNS HPF: ABNORMAL /HPF
UROBILINOGEN UR QL STRIP: ABNORMAL
WBC # UR STRIP: ABNORMAL /HPF
WBC NRBC COR # BLD: 22.37 10*3/MM3 (ref 3.4–10.8)

## 2021-12-22 PROCEDURE — 82570 ASSAY OF URINE CREATININE: CPT

## 2021-12-22 PROCEDURE — 84156 ASSAY OF PROTEIN URINE: CPT

## 2021-12-22 PROCEDURE — 83970 ASSAY OF PARATHORMONE: CPT

## 2021-12-22 PROCEDURE — 82306 VITAMIN D 25 HYDROXY: CPT

## 2021-12-22 PROCEDURE — 85027 COMPLETE CBC AUTOMATED: CPT

## 2021-12-22 PROCEDURE — 83735 ASSAY OF MAGNESIUM: CPT

## 2021-12-22 PROCEDURE — 80069 RENAL FUNCTION PANEL: CPT

## 2021-12-22 PROCEDURE — 81001 URINALYSIS AUTO W/SCOPE: CPT

## 2021-12-26 ENCOUNTER — APPOINTMENT (OUTPATIENT)
Dept: GENERAL RADIOLOGY | Facility: HOSPITAL | Age: 56
End: 2021-12-26

## 2021-12-26 ENCOUNTER — HOSPITAL ENCOUNTER (EMERGENCY)
Facility: HOSPITAL | Age: 56
Discharge: HOME | End: 2021-12-26
Attending: EMERGENCY MEDICINE | Admitting: EMERGENCY MEDICINE

## 2021-12-26 VITALS
RESPIRATION RATE: 16 BRPM | SYSTOLIC BLOOD PRESSURE: 105 MMHG | DIASTOLIC BLOOD PRESSURE: 54 MMHG | TEMPERATURE: 98.4 F | WEIGHT: 122.8 LBS | HEART RATE: 84 BPM | BODY MASS INDEX: 19.73 KG/M2 | OXYGEN SATURATION: 100 % | HEIGHT: 66 IN

## 2021-12-26 DIAGNOSIS — C85.90 LYMPHOMA, UNSPECIFIED BODY REGION, UNSPECIFIED LYMPHOMA TYPE (HCC): Primary | ICD-10-CM

## 2021-12-26 DIAGNOSIS — J02.9 ACUTE SORE THROAT: ICD-10-CM

## 2021-12-26 DIAGNOSIS — D69.6 THROMBOCYTOPENIA (HCC): ICD-10-CM

## 2021-12-26 DIAGNOSIS — D64.9 ANEMIA, UNSPECIFIED TYPE: ICD-10-CM

## 2021-12-26 LAB
ABO GROUP BLD: NORMAL
ABO GROUP BLD: NORMAL
ALBUMIN SERPL-MCNC: 2.9 G/DL (ref 3.5–5.2)
ALBUMIN/GLOB SERPL: 1.3 G/DL
ALP SERPL-CCNC: 150 U/L (ref 39–117)
ALT SERPL W P-5'-P-CCNC: 27 U/L (ref 1–33)
ANION GAP SERPL CALCULATED.3IONS-SCNC: 15.8 MMOL/L (ref 5–15)
ANISOCYTOSIS BLD QL: ABNORMAL
APTT PPP: 23.9 SECONDS (ref 22.2–34.2)
AST SERPL-CCNC: 50 U/L (ref 1–32)
BACTERIA UR QL AUTO: ABNORMAL /HPF
BASO STIPL COARSE BLD QL SMEAR: ABNORMAL
BILIRUB SERPL-MCNC: 1.5 MG/DL (ref 0–1.2)
BILIRUB UR QL STRIP: NEGATIVE
BLD GP AB SCN SERPL QL: NEGATIVE
BUN SERPL-MCNC: 103 MG/DL (ref 6–20)
BUN/CREAT SERPL: 44.4 (ref 7–25)
CALCIUM SPEC-SCNC: 8.7 MG/DL (ref 8.6–10.5)
CHLORIDE SERPL-SCNC: 103 MMOL/L (ref 98–107)
CLARITY UR: ABNORMAL
CO2 SERPL-SCNC: 13.2 MMOL/L (ref 22–29)
COLOR UR: ABNORMAL
CREAT SERPL-MCNC: 2.32 MG/DL (ref 0.57–1)
DEPRECATED RDW RBC AUTO: ABNORMAL FL
EOSINOPHIL # BLD MANUAL: 0.09 10*3/MM3 (ref 0–0.4)
EOSINOPHIL NFR BLD MANUAL: 2.4 % (ref 0.3–6.2)
GFR SERPL CREATININE-BSD FRML MDRD: 26 ML/MIN/1.73
GLOBULIN UR ELPH-MCNC: 2.2 GM/DL
GLUCOSE SERPL-MCNC: 108 MG/DL (ref 65–99)
GLUCOSE UR STRIP-MCNC: NEGATIVE MG/DL
HCT VFR BLD AUTO: 19 % (ref 34–46.6)
HGB BLD-MCNC: 6.4 G/DL (ref 12–15.9)
HGB UR QL STRIP.AUTO: ABNORMAL
HOLD SPECIMEN: NORMAL
HOLD SPECIMEN: NORMAL
HYALINE CASTS UR QL AUTO: ABNORMAL /LPF
HYPOCHROMIA BLD QL: ABNORMAL
INR PPP: 1.15 (ref 2–3)
KETONES UR QL STRIP: NEGATIVE
LEUKOCYTE ESTERASE UR QL STRIP.AUTO: ABNORMAL
LYMPHOCYTES # BLD MANUAL: 2.09 10*3/MM3 (ref 0.7–3.1)
LYMPHOCYTES NFR BLD MANUAL: 43.9 % (ref 5–12)
MACROCYTES BLD QL SMEAR: ABNORMAL
MAGNESIUM SERPL-MCNC: 2.9 MG/DL (ref 1.6–2.6)
MCH RBC QN AUTO: 35.4 PG (ref 26.6–33)
MCHC RBC AUTO-ENTMCNC: 33.7 G/DL (ref 31.5–35.7)
MCV RBC AUTO: 105 FL (ref 79–97)
MICROCYTES BLD QL: ABNORMAL
MONOCYTES # BLD: 1.71 10*3/MM3 (ref 0.1–0.9)
NEUTROPHILS # BLD AUTO: ABNORMAL 10*3/UL
NEUTROPHILS NFR BLD MANUAL: ABNORMAL %
NITRITE UR QL STRIP: NEGATIVE
NRBC SPEC MANUAL: 14.6 /100 WBC (ref 0–0.2)
PAPPENHEIMER BOD BLD QL SMEAR: PRESENT
PH UR STRIP.AUTO: 5.5 [PH] (ref 5–8)
PLAT MORPH BLD: NORMAL
PLATELET # BLD AUTO: 18 10*3/MM3 (ref 140–450)
PMV BLD AUTO: 12.1 FL (ref 6–12)
POTASSIUM SERPL-SCNC: 5.3 MMOL/L (ref 3.5–5.2)
PROT SERPL-MCNC: 5.1 G/DL (ref 6–8.5)
PROT UR QL STRIP: ABNORMAL
PROTHROMBIN TIME: 11.8 SECONDS (ref 9.4–12)
RBC # BLD AUTO: 1.81 10*6/MM3 (ref 3.77–5.28)
RBC # UR STRIP: ABNORMAL /HPF
REF LAB TEST METHOD: ABNORMAL
RH BLD: POSITIVE
RH BLD: POSITIVE
S PYO AG THROAT QL: NEGATIVE
SCHISTOCYTES BLD QL SMEAR: ABNORMAL
SODIUM SERPL-SCNC: 132 MMOL/L (ref 136–145)
SP GR UR STRIP: 1.01 (ref 1–1.03)
SQUAMOUS #/AREA URNS HPF: ABNORMAL /HPF
T&S EXPIRATION DATE: NORMAL
TARGETS BLD QL SMEAR: ABNORMAL
TROPONIN T SERPL-MCNC: <0.01 NG/ML (ref 0–0.03)
UROBILINOGEN UR QL STRIP: ABNORMAL
VARIANT LYMPHS NFR BLD MANUAL: 24.4 % (ref 0–5)
VARIANT LYMPHS NFR BLD MANUAL: 29.3 % (ref 19.6–45.3)
WBC # UR STRIP: ABNORMAL /HPF
WBC MORPH BLD: NORMAL
WBC NRBC COR # BLD: 3.89 10*3/MM3 (ref 3.4–10.8)
WHOLE BLOOD HOLD SPECIMEN: NORMAL
WHOLE BLOOD HOLD SPECIMEN: NORMAL

## 2021-12-26 PROCEDURE — P9100 PATHOGEN TEST FOR PLATELETS: HCPCS

## 2021-12-26 PROCEDURE — 86900 BLOOD TYPING SEROLOGIC ABO: CPT

## 2021-12-26 PROCEDURE — 93005 ELECTROCARDIOGRAM TRACING: CPT

## 2021-12-26 PROCEDURE — 86901 BLOOD TYPING SEROLOGIC RH(D): CPT

## 2021-12-26 PROCEDURE — 85610 PROTHROMBIN TIME: CPT | Performed by: EMERGENCY MEDICINE

## 2021-12-26 PROCEDURE — 84484 ASSAY OF TROPONIN QUANT: CPT | Performed by: EMERGENCY MEDICINE

## 2021-12-26 PROCEDURE — 36415 COLL VENOUS BLD VENIPUNCTURE: CPT

## 2021-12-26 PROCEDURE — 83735 ASSAY OF MAGNESIUM: CPT | Performed by: EMERGENCY MEDICINE

## 2021-12-26 PROCEDURE — 85025 COMPLETE CBC W/AUTO DIFF WBC: CPT | Performed by: EMERGENCY MEDICINE

## 2021-12-26 PROCEDURE — 86900 BLOOD TYPING SEROLOGIC ABO: CPT | Performed by: EMERGENCY MEDICINE

## 2021-12-26 PROCEDURE — 81001 URINALYSIS AUTO W/SCOPE: CPT | Performed by: EMERGENCY MEDICINE

## 2021-12-26 PROCEDURE — P9016 RBC LEUKOCYTES REDUCED: HCPCS

## 2021-12-26 PROCEDURE — P9035 PLATELET PHERES LEUKOREDUCED: HCPCS

## 2021-12-26 PROCEDURE — 99284 EMERGENCY DEPT VISIT MOD MDM: CPT

## 2021-12-26 PROCEDURE — 87081 CULTURE SCREEN ONLY: CPT | Performed by: EMERGENCY MEDICINE

## 2021-12-26 PROCEDURE — 86923 COMPATIBILITY TEST ELECTRIC: CPT

## 2021-12-26 PROCEDURE — P9037 PLATE PHERES LEUKOREDU IRRAD: HCPCS

## 2021-12-26 PROCEDURE — 85007 BL SMEAR W/DIFF WBC COUNT: CPT | Performed by: EMERGENCY MEDICINE

## 2021-12-26 PROCEDURE — 86901 BLOOD TYPING SEROLOGIC RH(D): CPT | Performed by: EMERGENCY MEDICINE

## 2021-12-26 PROCEDURE — 93005 ELECTROCARDIOGRAM TRACING: CPT | Performed by: EMERGENCY MEDICINE

## 2021-12-26 PROCEDURE — 36430 TRANSFUSION BLD/BLD COMPNT: CPT

## 2021-12-26 PROCEDURE — 80053 COMPREHEN METABOLIC PANEL: CPT | Performed by: EMERGENCY MEDICINE

## 2021-12-26 PROCEDURE — 86850 RBC ANTIBODY SCREEN: CPT | Performed by: EMERGENCY MEDICINE

## 2021-12-26 PROCEDURE — 85730 THROMBOPLASTIN TIME PARTIAL: CPT | Performed by: EMERGENCY MEDICINE

## 2021-12-26 PROCEDURE — 87880 STREP A ASSAY W/OPTIC: CPT | Performed by: EMERGENCY MEDICINE

## 2021-12-26 PROCEDURE — 71045 X-RAY EXAM CHEST 1 VIEW: CPT

## 2021-12-26 RX ORDER — LIDOCAINE HYDROCHLORIDE 20 MG/ML
15 SOLUTION OROPHARYNGEAL ONCE
Status: COMPLETED | OUTPATIENT
Start: 2021-12-26 | End: 2021-12-26

## 2021-12-26 RX ORDER — SODIUM CHLORIDE 0.9 % (FLUSH) 0.9 %
10 SYRINGE (ML) INJECTION AS NEEDED
Status: DISCONTINUED | OUTPATIENT
Start: 2021-12-26 | End: 2021-12-26 | Stop reason: HOSPADM

## 2021-12-26 RX ORDER — DIPHENHYDRAMINE, LIDOCAINE, NYSTATIN
5 KIT ORAL 4 TIMES DAILY
Qty: 200 ML | Refills: 0 | Status: SHIPPED | OUTPATIENT
Start: 2021-12-26

## 2021-12-26 RX ORDER — ALUMINA, MAGNESIA, AND SIMETHICONE 2400; 2400; 240 MG/30ML; MG/30ML; MG/30ML
15 SUSPENSION ORAL ONCE
Status: COMPLETED | OUTPATIENT
Start: 2021-12-26 | End: 2021-12-26

## 2021-12-26 RX ADMIN — SODIUM CHLORIDE 1000 ML: 9 INJECTION, SOLUTION INTRAVENOUS at 08:06

## 2021-12-26 RX ADMIN — ALUMINUM HYDROXIDE, MAGNESIUM HYDROXIDE, AND DIMETHICONE 15 ML: 400; 400; 40 SUSPENSION ORAL at 15:32

## 2021-12-26 RX ADMIN — SODIUM CHLORIDE 500 ML: 9 INJECTION, SOLUTION INTRAVENOUS at 10:41

## 2021-12-26 RX ADMIN — LIDOCAINE HYDROCHLORIDE 15 ML: 20 SOLUTION ORAL; TOPICAL at 15:32

## 2021-12-26 NOTE — ED PROVIDER NOTES
Time: 6:52 AM EST  Arrived by: private car  Chief Complaint: hematuria, oral bleeding.   History provided by: patient,   History is limited by: N/A     History of Present Illness:  Patient is a 56 y.o. year old female with a history of lymphoma (stage 4)  that presents to the emergency department with HEMATURIA and ORAL BLEEDING. Hematuria began yesterday and oral bleeding began last night. Currently oral bleeding is resolved.  is concerned new bleeding is due to infusion therapy 6 days ago.       reports patient started chemotherapy in July in [place] however chemotherapy damaged her kidneys so patient started a different chemotherapy treatment at [facility].     reports the patient also has a sore throat which began yesterday. Patient denies any nausea, vomiting or abdominal pain.        Blood in Urine  This is a new problem. The current episode started yesterday. The problem is unchanged. She describes the hematuria as gross hematuria. She is experiencing no pain. Pertinent negatives include no abdominal pain, chills, dysuria, fever, nausea or vomiting.           Patient Care Team  Primary Care Provider: Jessi Ennis APRN    Past Medical History:     Allergies   Allergen Reactions   • Bee Venom Hives   • Lisinopril Other (See Comments)     cough     Past Medical History:   Diagnosis Date   • Acute deep vein thrombosis (DVT) of proximal vein of right lower extremity (HCC) 7/28/2021   • Acute deep vein thrombosis (DVT) of right lower extremity (HCC) 07/20/2021   • Enlarged lymph node     R INGUINAL LYMPHNOD BX, NOW A KNOT GROIN   • High blood pressure    • Lymphoma (HCC)     under care of DR. SIMMONS [facility]   • Swelling of right lower extremity 7/20/2021     Past Surgical History:   Procedure Laterality Date   • ABDOMINAL HYSTERECTOMY      patient still has her ovaries   • BONE MARROW BIOPSY     • BREAST BIOPSY Left 7/23/2021    Procedure: BREAST BIOPSY BIOPSY OF SKIN,  SUBCUTANEOUS TISSUE AND/OR MUCOUS MEMBRANE of the left breast;  Surgeon: Grace Rob MD;  Location: [facility];  Service: General;  Laterality: Left;   • BREAST SURGERY      breast reduction (bilateral)    • COLONOSCOPY     • EXCISION LESION Right 6/9/2021    Procedure: EXCISION OF RIGHT INGUINAL LYMPHNOID;  Surgeon: Aaron Gordon MD;  Location: [facility] MAIN OR;  Service: General;  Laterality: Right;   • PORTACATH PLACEMENT Right 7/23/2021    Procedure: INSERTION OF PORTACATH;  Surgeon: Grace Rob MD;  Location: [facility];  Service: General;  Laterality: Right;   • US GUIDED LYMPH NODE BIOPSY  9/30/2021     Family History   Problem Relation Age of Onset   • Breast cancer Sister         50S   • Diabetes Paternal Grandmother    • Pancreatic cancer Father    • Malig Hyperthermia Neg Hx        Home Medications:  Prior to Admission medications    Medication Sig Start Date End Date Taking? Authorizing Provider   allopurinol (ZYLOPRIM) 100 MG tablet Take 3 tablets by mouth Daily. 9/28/21   Mabel Goff MD   allopurinol (ZYLOPRIM) 300 MG tablet Take 1 tablet by mouth 2 (Two) Times a Day. 11/23/21   Rach Galvan MD   carvedilol (COREG) 6.25 MG tablet Take 1 tablet by mouth 2 (Two) Times a Day With Meals for 30 days. 12/1/21 12/31/21  Jorge Martinez MD   dexamethasone (DECADRON) 4 MG tablet Take 4 mg by mouth Daily With Breakfast. 11/22/21   Rach Galvan MD   famotidine (PEPCID) 20 MG tablet Take 20 mg by mouth 2 (Two) Times a Day. 11/2/21   Rach Galvan MD   folic acid (FOLVITE) 1 MG tablet Take 1 mg by mouth Daily. 11/23/21   Rach Galvan MD   furosemide (LASIX) 40 MG tablet Take 40 mg by mouth Daily. 11/22/21   Rach Galvan MD   losartan (COZAAR) 25 MG tablet Take 1 tablet by mouth Daily for 30 days. 12/2/21 1/1/22  Jorge Martinez MD   prochlorperazine (COMPAZINE) 10 MG tablet Take 1 tablet by mouth Every 6 (Six) Hours As Needed for Nausea or  "Vomiting. 7/28/21   Ramón Holm MD   sennosides-docusate (senna-docusate sodium) 8.6-50 MG per tablet Take 1 tablet by mouth Daily.    Provider, MD Rach   spironolactone (ALDACTONE) 25 MG tablet Take 1 tablet by mouth Daily for 30 days. 12/2/21 1/1/22  Jorge Martinez MD        Social History:   Social History     Tobacco Use   • Smoking status: Never Smoker   • Smokeless tobacco: Never Used   Vaping Use   • Vaping Use: Never used   Substance Use Topics   • Alcohol use: Yes     Comment: rarely    • Drug use: Never       Review of Systems:  Review of Systems   Constitutional: Negative for chills and fever.   HENT: Positive for sore throat. Negative for ear discharge.         Oral bleeding   Eyes: Negative for photophobia.   Respiratory: Negative for cough and shortness of breath.    Cardiovascular: Negative for chest pain.   Gastrointestinal: Negative for abdominal pain, diarrhea, nausea and vomiting.   Genitourinary: Positive for hematuria. Negative for dysuria.   Musculoskeletal: Negative for back pain and neck pain.   Skin: Negative for rash.   Neurological: Negative for headaches.        Physical Exam:  /54   Pulse 84   Temp 98.4 °F (36.9 °C)   Resp 16   Ht 167.6 cm (66\")   Wt 55.7 kg (122 lb 12.7 oz)   SpO2 100%   BMI 19.82 kg/m²     Physical Exam  Vitals and nursing note reviewed.   Constitutional:       General: She is not in acute distress.  HENT:      Head: Normocephalic and atraumatic.      Mouth/Throat:      Comments: Dry blood on gum line and tongue. No active bleeding.   Cardiovascular:      Rate and Rhythm: Normal rate and regular rhythm.      Heart sounds: No murmur heard.      Pulmonary:      Effort: No respiratory distress.      Breath sounds: Normal breath sounds.   Abdominal:      Palpations: Abdomen is soft.      Tenderness: There is no abdominal tenderness.   Genitourinary:     Comments: Exam deferred  Musculoskeletal:      Cervical back: Neck supple.      Right lower leg: " No edema.      Left lower leg: No edema.   Skin:     General: Skin is warm and dry.      Comments: Stage 2 decubitus ulcer on upper left buttock about 6 cm by 4 cm. Stage 2 decubitus ulcer on right upper buttock about 3 cm by 2 cm.    Neurological:      General: No focal deficit present.      Mental Status: She is alert and oriented to person, place, and time.                Medications in the Emergency Department:  Medications   sodium chloride 0.9 % bolus 1,000 mL (0 mL Intravenous Stopped 12/26/21 1036)   sodium chloride 0.9 % bolus 500 mL (0 mL Intravenous Stopped 12/26/21 1225)   aluminum-magnesium hydroxide-simethicone (MAALOX MAX) 400-400-40 MG/5ML suspension 15 mL (15 mL Oral Given 12/26/21 1532)   Lidocaine Viscous HCl (XYLOCAINE) 2 % solution 15 mL (15 mL Mouth/Throat Given 12/26/21 1532)        Labs  Lab Results (last 24 hours)     Procedure Component Value Units Date/Time    Rapid Strep A Screen - Swab, Throat [[id number]]  (Normal) Collected: 12/26/21 0902    Specimen: Swab from Throat Updated: 12/26/21 0943     Strep A Ag Negative    Beta Strep Culture, Throat - Swab, Throat [[id number]] Collected: 12/26/21 0902    Specimen: Swab from Throat Updated: 12/26/21 0943    Urinalysis With Culture If Indicated - Urine, Clean Catch [[id number]]  (Abnormal) Collected: 12/26/21 0935    Specimen: Urine, Clean Catch Updated: 12/26/21 1028     Color, UA Red     Appearance, UA Cloudy     pH, UA 5.5     Specific Gravity, UA 1.013     Glucose, UA Negative     Ketones, UA Negative     Bilirubin, UA Negative     Blood, UA Moderate (2+)     Protein,  mg/dL (2+)     Leuk Esterase, UA Small (1+)     Nitrite, UA Negative     Urobilinogen, UA 0.2 E.U./dL    Urinalysis, Microscopic Only - Urine, Clean Catch [[id number]]  (Abnormal) Collected: 12/26/21 0935    Specimen: Urine, Clean Catch Updated: 12/26/21 1029     RBC, UA Too Numerous to Count /HPF      WBC, UA 6-12 /HPF      Bacteria, UA None Seen /HPF      Squamous  Epithelial Cells, UA 0-2 /HPF      Hyaline Casts, UA 3-6 /LPF      Methodology Automated Microscopy           Imaging:  No Radiology Exams Resulted Within Past 24 Hours    Procedures:  Procedures    Progress  ED Course as of 12/27/21 0854   Sun Dec 26, 2021   1037 Platelets(!!): 18 [NL]      ED Course User Index  [NL] Rebel Heredia DO                            Medical Decision Making:  MDM   56-year-old female patient is currently undergoing chemotherapy at [facility] for lymphoma presented with complaint of weakness and hematuria and bleeding from her gums.  Patient's hemoglobin was found to be 6.3 and her platelets were at 18,000.  I discussed the patient with the oncologist on-call at .  He recommended transfusing the patient with 1 unit of packed red blood cells and 1 unit of platelets.  He states the patient has an appointment on Monday at the infusion center and if the patient is stable she can be discharged and follow-up.  Patient's blood pressure improved and her bleeding was controlled.  The patient felt better after receiving IV fluids and blood.  Patient is stable for discharge.        Final diagnoses:   Lymphoma, unspecified body region, unspecified lymphoma type (HCC)   Anemia, unspecified type   Thrombocytopenia (HCC)   Acute sore throat        Disposition:  ED Disposition     ED Disposition Condition Comment    Discharge Stable           Documentation assistance provided by Gayle Cortes acting as scribe for Rebel Heredia DO. Information recorded by the scribe was done at my direction and has been verified and validated by me.          Gayle Cortes  12/26/21 0934       Rebel Heredia DO  12/27/21 0855

## 2021-12-26 NOTE — DISCHARGE INSTRUCTIONS
Follow-up tomorrow at [facility] for your scheduled transfusions.  Hold your spironolactone until discussed with your nephrologist.  Consider ear nose and throat follow-up for sore throat if pain persists.

## 2021-12-27 LAB
BH BB BLOOD EXPIRATION DATE: NORMAL
BH BB BLOOD EXPIRATION DATE: NORMAL
BH BB BLOOD TYPE BARCODE: 5100
BH BB BLOOD TYPE BARCODE: 5100
BH BB DISPENSE STATUS: NORMAL
BH BB DISPENSE STATUS: NORMAL
BH BB PRODUCT CODE: NORMAL
BH BB PRODUCT CODE: NORMAL
BH BB UNIT NUMBER: NORMAL
BH BB UNIT NUMBER: NORMAL
CROSSMATCH INTERPRETATION: NORMAL
UNIT  ABO: NORMAL
UNIT  ABO: NORMAL
UNIT  RH: NORMAL
UNIT  RH: NORMAL

## 2021-12-28 LAB — BACTERIA SPEC AEROBE CULT: NORMAL

## 2022-02-01 LAB — QT INTERVAL: 346 MS

## (undated) DEVICE — SLV SCD LEG COMFORT KENDALLSCD MD REPROC

## (undated) DEVICE — INTENDED FOR TISSUE SEPARATION, AND OTHER PROCEDURES THAT REQUIRE A SHARP SURGICAL BLADE TO PUNCTURE OR CUT.: Brand: BARD-PARKER ® CARBON RIB-BACK BLADES

## (undated) DEVICE — SUT PERMAHAND SILK 0 FSL 30IN BLK

## (undated) DEVICE — ADHS LIQ MASTISOL 2/3ML

## (undated) DEVICE — STERILE POLYISOPRENE POWDER-FREE SURGICAL GLOVES WITH EMOLLIENT COATING: Brand: PROTEXIS

## (undated) DEVICE — CONTAINER,SPECIMEN,O.R.STRL,4.5OZ: Brand: MEDLINE

## (undated) DEVICE — MAJOR-LF: Brand: MEDLINE INDUSTRIES, INC.

## (undated) DEVICE — SUT VIC 3/0 SH 27IN J416H

## (undated) DEVICE — PENCL E/S SMOKEEVAC W/TELESCP CANN

## (undated) DEVICE — GOWN,REINFRCE,POLY,SIRUS,BREATH SLV,XXLG: Brand: MEDLINE

## (undated) DEVICE — DEV OPN LIGASURE SM/JAW 28D 16.5MM 18.8CM 1P/U

## (undated) DEVICE — DRSNG SURG AQUACEL AG 9X15CM

## (undated) DEVICE — SUT PROLN 2/0 CT2 30IN 8411H

## (undated) DEVICE — PUNCH BIOP 5MM STRL

## (undated) DEVICE — GLV SURG SENSICARE SLT PF LF 7.5 STRL

## (undated) DEVICE — GOWN,REINFORCE,POLY,SIRUS,BREATH SLV,XLG: Brand: MEDLINE

## (undated) DEVICE — VASCULAR ACCESS-LF: Brand: MEDLINE INDUSTRIES, INC.

## (undated) DEVICE — GLV SURG SENSICARE SLT PF LF 6.5 STRL

## (undated) DEVICE — ANTIBACTERIAL UNDYED BRAIDED (POLYGLACTIN 910), SYNTHETIC ABSORBABLE SUTURE: Brand: COATED VICRYL

## (undated) DEVICE — ELECTRD BLD EDGE/INSUL1P 2.4X5.1MM STRL

## (undated) DEVICE — SKIN AFFIX SURG ADHESIVE 72/CS 0.55ML: Brand: MEDLINE

## (undated) DEVICE — STRIP CLS WND SUTURESTRIP/PLS 0.5X4IN TP1103